# Patient Record
Sex: FEMALE | Race: BLACK OR AFRICAN AMERICAN | NOT HISPANIC OR LATINO | Employment: FULL TIME | URBAN - NONMETROPOLITAN AREA
[De-identification: names, ages, dates, MRNs, and addresses within clinical notes are randomized per-mention and may not be internally consistent; named-entity substitution may affect disease eponyms.]

---

## 2021-01-24 ENCOUNTER — HOSPITAL ENCOUNTER (EMERGENCY)
Facility: HOSPITAL | Age: 41
Discharge: HOME | End: 2021-01-24
Attending: EMERGENCY MEDICINE
Payer: COMMERCIAL

## 2021-01-24 VITALS
WEIGHT: 213.19 LBS | TEMPERATURE: 98.9 F | OXYGEN SATURATION: 97 % | RESPIRATION RATE: 16 BRPM | HEART RATE: 84 BPM | BODY MASS INDEX: 36.4 KG/M2 | HEIGHT: 64 IN | DIASTOLIC BLOOD PRESSURE: 92 MMHG | SYSTOLIC BLOOD PRESSURE: 144 MMHG

## 2021-01-24 DIAGNOSIS — S81.812A LACERATION OF LEFT LOWER LEG, INITIAL ENCOUNTER: Primary | ICD-10-CM

## 2021-01-24 PROCEDURE — 90715 TDAP VACCINE 7 YRS/> IM: CPT | Performed by: EMERGENCY MEDICINE

## 2021-01-24 PROCEDURE — 12001 RPR S/N/AX/GEN/TRNK 2.5CM/<: CPT | Performed by: EMERGENCY MEDICINE

## 2021-01-24 PROCEDURE — 99282 EMERGENCY DEPT VISIT SF MDM: CPT | Performed by: EMERGENCY MEDICINE

## 2021-01-24 PROCEDURE — 99283 EMERGENCY DEPT VISIT LOW MDM: CPT

## 2021-01-24 PROCEDURE — 90471 IMMUNIZATION ADMIN: CPT

## 2021-01-24 RX ORDER — LIDOCAINE HYDROCHLORIDE AND EPINEPHRINE 10; 10 MG/ML; UG/ML
5 INJECTION, SOLUTION INFILTRATION; PERINEURAL ONCE
Status: COMPLETED | OUTPATIENT
Start: 2021-01-24 | End: 2021-01-24

## 2021-01-24 RX ADMIN — LIDOCAINE HYDROCHLORIDE,EPINEPHRINE BITARTRATE 5 ML: 10; .01 INJECTION, SOLUTION INFILTRATION; PERINEURAL at 21:23

## 2021-01-24 RX ADMIN — TETANUS TOXOID, REDUCED DIPHTHERIA TOXOID AND ACELLULAR PERTUSSIS VACCINE, ADSORBED 0.5 ML: 5; 2.5; 8; 8; 2.5 SUSPENSION INTRAMUSCULAR at 21:21

## 2021-01-25 NOTE — ED NOTES
Patient's leg was wrapped with non-stick telfa, 4x4's and guaze  Patient verbalized understanding with d/c instructions        Feroz Alfaro RN  01/24/21 5648

## 2021-01-25 NOTE — ED PROVIDER NOTES
History  Chief Complaint   Patient presents with    Laceration     was getting a case of beer off a shelf and it hit her in the  left shin resulting in a laceration to it  59-year-old female with reported past medical history diabetes mellitus and asthma who is presenting for evaluation of a laceration to her left lower leg  Patient reports that she was purchasing a case of beer and went to get case of beer off a shelf when a bottle fell and cut her shin  This occurred about 1 hour prior to presentation  Bleeding is controlled at this time  Patient denies any other complaints at this time  She is not aware of the last time she received a vaccination for tetanus  Patient has no other complaints on review of systems  Prior to Admission Medications   Prescriptions Last Dose Informant Patient Reported? Taking?   sitaGLIPtin (JANUVIA) 100 mg tablet   Yes Yes   Sig: Take 100 mg by mouth daily      Facility-Administered Medications: None       Past Medical History:   Diagnosis Date    Asthma     Diabetes mellitus ([facility] [address] )        Past Surgical History:   Procedure Laterality Date    ADENOIDECTOMY       SECTION      FOOT FRACTURE SURGERY         History reviewed  No pertinent family history  I have reviewed and agree with the history as documented  E-Cigarette/Vaping    E-Cigarette Use Never User      E-Cigarette/Vaping Substances    Nicotine No     THC No     CBD No     Flavoring No     Other No     Unknown No      Social History     Tobacco Use    Smoking status: Current Every Day Smoker     Packs/day: 1 50    Smokeless tobacco: Never Used   Substance Use Topics    Alcohol use: Not Currently     Frequency: Never    Drug use: Never       Review of Systems   Constitutional: Negative for diaphoresis, fever and unexpected weight change  HENT: Negative for congestion, rhinorrhea and sore throat  Eyes: Negative for pain, discharge and visual disturbance     Respiratory: Negative for cough, shortness of breath and wheezing  Cardiovascular: Negative for chest pain, palpitations and leg swelling  Gastrointestinal: Negative for abdominal pain, blood in stool, constipation, diarrhea, nausea and vomiting  Genitourinary: Negative for dysuria, flank pain and hematuria  Musculoskeletal: Negative for arthralgias and joint swelling  Skin: Positive for wound (left lower leg)  Negative for rash  Allergic/Immunologic: Negative for environmental allergies and food allergies  Neurological: Negative for dizziness, seizures, weakness and numbness  Hematological: Negative for adenopathy  Psychiatric/Behavioral: Negative for confusion and hallucinations  Physical Exam  Physical Exam  Vitals signs and nursing note reviewed  Constitutional:       General: She is not in acute distress  Appearance: She is well-developed  HENT:      Head: Normocephalic and atraumatic  Right Ear: External ear normal       Left Ear: External ear normal    Eyes:      Conjunctiva/sclera: Conjunctivae normal       Pupils: Pupils are equal, round, and reactive to light  Cardiovascular:      Rate and Rhythm: Normal rate and regular rhythm  Pulmonary:      Effort: Pulmonary effort is normal  No respiratory distress  Musculoskeletal: Normal range of motion  General: No deformity  Skin:     General: Skin is warm and dry  Capillary Refill: Capillary refill takes less than 2 seconds  Comments: There is an approximately 2 cm laceration to the anterior left lower leg  Laceration extends through the epidermis but not into the subcutaneous fat  Bleeding is controlled  Neurological:      Mental Status: She is alert and oriented to person, place, and time  Comments: No gross motor deficits noted  Cranial nerves II-XII are intact  Speech is normal, without dysarthria or aphasia     Psychiatric:         Mood and Affect: Mood normal          Behavior: Behavior normal  Vital Signs  ED Triage Vitals [01/24/21 2050]   Temperature Pulse Respirations Blood Pressure SpO2   98 9 °F (37 2 °C) 84 18 141/85 98 %      Temp Source Heart Rate Source Patient Position - Orthostatic VS BP Location FiO2 (%)   Temporal Monitor Sitting Left arm --      Pain Score       8           Vitals:    01/24/21 2050 01/24/21 2100   BP: 141/85 144/92   Pulse: 84 84   Patient Position - Orthostatic VS: Sitting Sitting         Visual Acuity      ED Medications  Medications   lidocaine-epinephrine (XYLOCAINE/EPINEPHRINE) 1 %-1:100,000 injection 5 mL (5 mL Infiltration Given 1/24/21 2123)   tetanus-diphtheria-acellular pertussis (BOOSTRIX) IM injection 0 5 mL (0 5 mL Intramuscular Given 1/24/21 2121)       Diagnostic Studies  Results Reviewed     None                 No orders to display              Procedures  Laceration repair    Date/Time: 1/24/2021 9:54 PM  Performed by: Krzysztof Sahni MD  Authorized by: Krzysztof Sahni MD   Consent: Verbal consent obtained  Risks and benefits: risks, benefits and alternatives were discussed  Consent given by: patient  Patient understanding: patient states understanding of the procedure being performed  Patient consent: the patient's understanding of the procedure matches consent given  Procedure consent: procedure consent matches procedure scheduled  Site marked: the operative site was marked  Patient identity confirmed: verbally with patient and arm band  Time out: Immediately prior to procedure a "time out" was called to verify the correct patient, procedure, equipment, support staff and site/side marked as required    Body area: lower extremity  Location details: left lower leg  Laceration length: 2 cm  Foreign bodies: no foreign bodies  Tendon involvement: none  Nerve involvement: none  Vascular damage: no  Anesthesia: local infiltration    Anesthesia:  Local Anesthetic: lidocaine 1% with epinephrine  Anesthetic total: 2 mL      Procedure Details:  Preparation: Patient was prepped and draped in the usual sterile fashion  Irrigation solution: saline  Irrigation method: jet lavage  Amount of cleaning: standard  Skin closure: 4-0 nylon  Number of sutures: 4  Technique: simple  Approximation: close  Approximation difficulty: simple  Patient tolerance: patient tolerated the procedure well with no immediate complications                   ED Course                             SBIRT 20yo+      Most Recent Value   SBIRT (24 yo +)   In order to provide better care to our patients, we are screening all of our patients for alcohol and drug use  Would it be okay to ask you these screening questions? Yes Filed at: 01/24/2021 2058   Initial Alcohol Screen: US AUDIT-C    1  How often do you have a drink containing alcohol?  0 Filed at: 01/24/2021 2058   2  How many drinks containing alcohol do you have on a typical day you are drinking? 0 Filed at: 01/24/2021 2058   3a  Male UNDER 65: How often do you have five or more drinks on one occasion? 0 Filed at: 01/24/2021 2058   3b  FEMALE Any Age, or MALE 65+: How often do you have 4 or more drinks on one occassion? 0 Filed at: 01/24/2021 2058   Audit-C Score  0 Filed at: 01/24/2021 2058   DEE: How many times in the past year have you    Used an illegal drug or used a prescription medication for non-medical reasons? Never Filed at: 01/24/2021 2058                    MDM  Number of Diagnoses or Management Options  Laceration of left lower leg, initial encounter: new and does not require workup  Diagnosis management comments:     Patient presented with a laceration of the left lower leg extending through the epidermis but not into the subcutaneous fat  No contamination of the wound  Tetanus not up-to-date  Patient was given a tetanus immunization  Laceration was repaired as documented above after irrigation  Patient tolerated well  Provided wound care instructions    Advised follow-up for suture removal in 10-14 days  Patient verbalized understanding of wound care instructions, need for follow-up for suture removal, return precautions, and other instructions provided  Amount and/or Complexity of Data Reviewed  Decide to obtain previous medical records or to obtain history from someone other than the patient: yes  Review and summarize past medical records: yes    Risk of Complications, Morbidity, and/or Mortality  Presenting problems: low  Diagnostic procedures: minimal  Management options: minimal    Patient Progress  Patient progress: improved      Disposition  Final diagnoses:   Laceration of left lower leg, initial encounter     Time reflects when diagnosis was documented in both MDM as applicable and the Disposition within this note     Time User Action Codes Description Comment    1/24/2021  9:53 PM Saqib Huang Add [P15 [id number]] Laceration of left lower leg, initial encounter       ED Disposition     ED Disposition Condition Date/Time Comment    Discharge Good Sun Jan 24, 2021  9:52 PM [facility] discharge to home/self care  Follow-up Information     Follow up With Specialties Details Why Contact Info Additional Information    PCP  Call in 1 day Please find a PCP and follow-up within the next few weeks to establish care  [facility] Emergency Department Emergency Medicine Go on 2/7/2021 For suture removal in 10-14 days  [address] [phone]  [facility] Emergency Department, [address], [address]          Patient's Medications   Discharge Prescriptions    No medications on file     No discharge procedures on file      PDMP Review     None          ED Provider  Electronically Signed by           Elroy Erickson MD  01/24/21 8713

## 2021-01-25 NOTE — DISCHARGE INSTRUCTIONS
Do not submerge the laceration in water  Avoid baths, hot tubs, lakes, rivers, and other bodies of water  You should clean laceration with soap and water  Avoid alcohol and hydrogen peroxide as these can inhibit wound healing  Sutures should be removed in 10-14 days  You may return here or go to an urgent care or your PCP to have this done  Closely monitor for signs of infection  If you develop redness spreading from the laceration, pus draining from the laceration, fever, or shaking chills, return to the ER or call your PCP  Return to the ER if the wound splits open or if it has heavy bleeding that does not resolve with more than 15 minutes of firm, direct pressure

## 2021-04-07 PROBLEM — Z00.00 ENCOUNTER FOR PREVENTIVE HEALTH EXAMINATION: Status: ACTIVE | Noted: 2021-04-07

## 2021-04-08 ENCOUNTER — APPOINTMENT (OUTPATIENT)
Dept: ORTHOPEDIC SURGERY | Facility: CLINIC | Age: 41
End: 2021-04-08

## 2021-11-07 ENCOUNTER — HOSPITAL ENCOUNTER (EMERGENCY)
Facility: HOSPITAL | Age: 41
Discharge: HOME | End: 2021-11-07
Attending: FAMILY MEDICINE
Payer: COMMERCIAL

## 2021-11-07 VITALS
TEMPERATURE: 97.5 F | SYSTOLIC BLOOD PRESSURE: 136 MMHG | RESPIRATION RATE: 18 BRPM | HEART RATE: 83 BPM | DIASTOLIC BLOOD PRESSURE: 74 MMHG | WEIGHT: 185 LBS | BODY MASS INDEX: 31.76 KG/M2 | OXYGEN SATURATION: 98 %

## 2021-11-07 DIAGNOSIS — R60.0 LOWER EXTREMITY EDEMA: Primary | ICD-10-CM

## 2021-11-07 PROCEDURE — 99283 EMERGENCY DEPT VISIT LOW MDM: CPT

## 2021-11-07 PROCEDURE — 99284 EMERGENCY DEPT VISIT MOD MDM: CPT | Performed by: PHYSICIAN ASSISTANT

## 2021-12-19 ENCOUNTER — HOSPITAL ENCOUNTER (EMERGENCY)
Facility: HOSPITAL | Age: 41
Discharge: HOME | End: 2021-12-19
Attending: EMERGENCY MEDICINE
Payer: COMMERCIAL

## 2021-12-19 VITALS
WEIGHT: 194 LBS | HEART RATE: 77 BPM | BODY MASS INDEX: 33.3 KG/M2 | SYSTOLIC BLOOD PRESSURE: 147 MMHG | RESPIRATION RATE: 18 BRPM | OXYGEN SATURATION: 100 % | DIASTOLIC BLOOD PRESSURE: 70 MMHG | TEMPERATURE: 98.3 F

## 2021-12-19 DIAGNOSIS — B34.9 VIRAL SYNDROME: Primary | ICD-10-CM

## 2021-12-19 LAB
BETA-HYDROXYBUTYRATE: 0.1 MMOL/L
FLUAV RNA RESP QL NAA+PROBE: NEGATIVE
FLUBV RNA RESP QL NAA+PROBE: NEGATIVE
GLUCOSE SERPL-MCNC: 281 MG/DL (ref 65–140)
RSV RNA RESP QL NAA+PROBE: NEGATIVE
SARS-COV-2 RNA RESP QL NAA+PROBE: NEGATIVE

## 2021-12-19 PROCEDURE — 36415 COLL VENOUS BLD VENIPUNCTURE: CPT | Performed by: EMERGENCY MEDICINE

## 2021-12-19 PROCEDURE — 0241U HB NFCT DS VIR RESP RNA 4 TRGT: CPT | Performed by: EMERGENCY MEDICINE

## 2021-12-19 PROCEDURE — 82010 KETONE BODYS QUAN: CPT | Performed by: EMERGENCY MEDICINE

## 2021-12-19 PROCEDURE — 96372 THER/PROPH/DIAG INJ SC/IM: CPT

## 2021-12-19 PROCEDURE — 82948 REAGENT STRIP/BLOOD GLUCOSE: CPT

## 2021-12-19 PROCEDURE — 99283 EMERGENCY DEPT VISIT LOW MDM: CPT

## 2021-12-19 PROCEDURE — 96360 HYDRATION IV INFUSION INIT: CPT

## 2021-12-19 PROCEDURE — 99282 EMERGENCY DEPT VISIT SF MDM: CPT | Performed by: EMERGENCY MEDICINE

## 2021-12-19 RX ORDER — KETOROLAC TROMETHAMINE 30 MG/ML
15 INJECTION, SOLUTION INTRAMUSCULAR; INTRAVENOUS ONCE
Status: COMPLETED | OUTPATIENT
Start: 2021-12-19 | End: 2021-12-19

## 2021-12-19 RX ADMIN — KETOROLAC TROMETHAMINE 15 MG: 30 INJECTION, SOLUTION INTRAMUSCULAR at 05:51

## 2021-12-19 RX ADMIN — SODIUM CHLORIDE 1000 ML: 0.9 INJECTION, SOLUTION INTRAVENOUS at 06:09

## 2022-02-23 ENCOUNTER — HOSPITAL ENCOUNTER (EMERGENCY)
Facility: HOSPITAL | Age: 42
Discharge: HOME | End: 2022-02-23
Attending: EMERGENCY MEDICINE | Admitting: EMERGENCY MEDICINE
Payer: COMMERCIAL

## 2022-02-23 ENCOUNTER — APPOINTMENT (EMERGENCY)
Dept: CT IMAGING | Facility: HOSPITAL | Age: 42
End: 2022-02-23
Payer: COMMERCIAL

## 2022-02-23 VITALS
HEIGHT: 64 IN | WEIGHT: 200 LBS | DIASTOLIC BLOOD PRESSURE: 78 MMHG | SYSTOLIC BLOOD PRESSURE: 137 MMHG | TEMPERATURE: 97.9 F | BODY MASS INDEX: 34.15 KG/M2 | HEART RATE: 83 BPM | OXYGEN SATURATION: 100 % | RESPIRATION RATE: 21 BRPM

## 2022-02-23 DIAGNOSIS — R10.9 ABDOMINAL PAIN: Primary | ICD-10-CM

## 2022-02-23 LAB
ALBUMIN SERPL BCP-MCNC: 3.5 G/DL (ref 3.5–5)
ALP SERPL-CCNC: 80 U/L (ref 46–116)
ALT SERPL W P-5'-P-CCNC: 18 U/L (ref 12–78)
ANION GAP SERPL CALCULATED.3IONS-SCNC: 8 MMOL/L (ref 4–13)
AST SERPL W P-5'-P-CCNC: 9 U/L (ref 5–45)
BASOPHILS # BLD AUTO: 0.05 THOUSANDS/ΜL (ref 0–0.1)
BASOPHILS NFR BLD AUTO: 0 % (ref 0–1)
BILIRUB SERPL-MCNC: 0.32 MG/DL (ref 0.2–1)
BILIRUB UR QL STRIP: NEGATIVE
BUN SERPL-MCNC: 18 MG/DL (ref 5–25)
CALCIUM SERPL-MCNC: 8.9 MG/DL (ref 8.3–10.1)
CHLORIDE SERPL-SCNC: 104 MMOL/L (ref 100–108)
CLARITY UR: CLEAR
CO2 SERPL-SCNC: 24 MMOL/L (ref 21–32)
COLOR UR: YELLOW
CREAT SERPL-MCNC: 1.09 MG/DL (ref 0.6–1.3)
EOSINOPHIL # BLD AUTO: 0.24 THOUSAND/ΜL (ref 0–0.61)
EOSINOPHIL NFR BLD AUTO: 2 % (ref 0–6)
ERYTHROCYTE [DISTWIDTH] IN BLOOD BY AUTOMATED COUNT: 12.7 % (ref 11.6–15.1)
EXT PREG TEST URINE: NEGATIVE
EXT. CONTROL ED NAV: NORMAL
GFR SERPL CREATININE-BSD FRML MDRD: 62 ML/MIN/1.73SQ M
GLUCOSE SERPL-MCNC: 192 MG/DL (ref 65–140)
GLUCOSE UR STRIP-MCNC: NEGATIVE MG/DL
HCT VFR BLD AUTO: 44.2 % (ref 34.8–46.1)
HGB BLD-MCNC: 15 G/DL (ref 11.5–15.4)
HGB UR QL STRIP.AUTO: NEGATIVE
IMM GRANULOCYTES # BLD AUTO: 0.04 THOUSAND/UL (ref 0–0.2)
IMM GRANULOCYTES NFR BLD AUTO: 0 % (ref 0–2)
KETONES UR STRIP-MCNC: NEGATIVE MG/DL
LACTATE SERPL-SCNC: 1 MMOL/L (ref 0.5–2)
LEUKOCYTE ESTERASE UR QL STRIP: NEGATIVE
LIPASE SERPL-CCNC: 212 U/L (ref 73–393)
LYMPHOCYTES # BLD AUTO: 3.82 THOUSANDS/ΜL (ref 0.6–4.47)
LYMPHOCYTES NFR BLD AUTO: 33 % (ref 14–44)
MCH RBC QN AUTO: 29.5 PG (ref 26.8–34.3)
MCHC RBC AUTO-ENTMCNC: 33.9 G/DL (ref 31.4–37.4)
MCV RBC AUTO: 87 FL (ref 82–98)
MONOCYTES # BLD AUTO: 0.76 THOUSAND/ΜL (ref 0.17–1.22)
MONOCYTES NFR BLD AUTO: 7 % (ref 4–12)
NEUTROPHILS # BLD AUTO: 6.85 THOUSANDS/ΜL (ref 1.85–7.62)
NEUTS SEG NFR BLD AUTO: 58 % (ref 43–75)
NITRITE UR QL STRIP: NEGATIVE
NRBC BLD AUTO-RTO: 0 /100 WBCS
PH UR STRIP.AUTO: 6 [PH]
PLATELET # BLD AUTO: 289 THOUSANDS/UL (ref 149–390)
PMV BLD AUTO: 10.7 FL (ref 8.9–12.7)
POTASSIUM SERPL-SCNC: 3.9 MMOL/L (ref 3.5–5.3)
PROT SERPL-MCNC: 7.4 G/DL (ref 6.4–8.2)
PROT UR STRIP-MCNC: NEGATIVE MG/DL
RBC # BLD AUTO: 5.09 MILLION/UL (ref 3.81–5.12)
SODIUM SERPL-SCNC: 136 MMOL/L (ref 136–145)
SP GR UR STRIP.AUTO: 1.02 (ref 1–1.03)
UROBILINOGEN UR QL STRIP.AUTO: 0.2 E.U./DL
WBC # BLD AUTO: 11.76 THOUSAND/UL (ref 4.31–10.16)

## 2022-02-23 PROCEDURE — 99284 EMERGENCY DEPT VISIT MOD MDM: CPT

## 2022-02-23 PROCEDURE — 81003 URINALYSIS AUTO W/O SCOPE: CPT | Performed by: EMERGENCY MEDICINE

## 2022-02-23 PROCEDURE — 85025 COMPLETE CBC W/AUTO DIFF WBC: CPT | Performed by: EMERGENCY MEDICINE

## 2022-02-23 PROCEDURE — 83690 ASSAY OF LIPASE: CPT | Performed by: EMERGENCY MEDICINE

## 2022-02-23 PROCEDURE — 36415 COLL VENOUS BLD VENIPUNCTURE: CPT | Performed by: EMERGENCY MEDICINE

## 2022-02-23 PROCEDURE — 99284 EMERGENCY DEPT VISIT MOD MDM: CPT | Performed by: EMERGENCY MEDICINE

## 2022-02-23 PROCEDURE — 96361 HYDRATE IV INFUSION ADD-ON: CPT

## 2022-02-23 PROCEDURE — G1004 CDSM NDSC: HCPCS

## 2022-02-23 PROCEDURE — 83605 ASSAY OF LACTIC ACID: CPT | Performed by: EMERGENCY MEDICINE

## 2022-02-23 PROCEDURE — 96375 TX/PRO/DX INJ NEW DRUG ADDON: CPT

## 2022-02-23 PROCEDURE — 81025 URINE PREGNANCY TEST: CPT | Performed by: EMERGENCY MEDICINE

## 2022-02-23 PROCEDURE — 80053 COMPREHEN METABOLIC PANEL: CPT | Performed by: EMERGENCY MEDICINE

## 2022-02-23 PROCEDURE — 74177 CT ABD & PELVIS W/CONTRAST: CPT

## 2022-02-23 PROCEDURE — 96374 THER/PROPH/DIAG INJ IV PUSH: CPT

## 2022-02-23 RX ORDER — KETOROLAC TROMETHAMINE 30 MG/ML
15 INJECTION, SOLUTION INTRAMUSCULAR; INTRAVENOUS ONCE
Status: COMPLETED | OUTPATIENT
Start: 2022-02-23 | End: 2022-02-23

## 2022-02-23 RX ORDER — ONDANSETRON 2 MG/ML
4 INJECTION INTRAMUSCULAR; INTRAVENOUS ONCE
Status: COMPLETED | OUTPATIENT
Start: 2022-02-23 | End: 2022-02-23

## 2022-02-23 RX ADMIN — SODIUM CHLORIDE 2000 ML: 0.9 INJECTION, SOLUTION INTRAVENOUS at 19:16

## 2022-02-23 RX ADMIN — IOHEXOL 100 ML: 350 INJECTION, SOLUTION INTRAVENOUS at 19:43

## 2022-02-23 RX ADMIN — ONDANSETRON 4 MG: 2 INJECTION INTRAMUSCULAR; INTRAVENOUS at 19:17

## 2022-02-23 RX ADMIN — KETOROLAC TROMETHAMINE 15 MG: 30 INJECTION, SOLUTION INTRAMUSCULAR at 19:17

## 2022-02-23 NOTE — ED PROVIDER NOTES
History  Chief Complaint   Patient presents with    Abdominal Pain     pt complaijns of mid lower abdominal pain that started this AM     HPI  34 yo female with cc lower abdominal pain that is severe onset starting lower abdomen and then goes up to umbilicus; states has had these many times in past, sometimes not bad enough to come to ED ; notes most recent ED severity was last year; No nv; sweats from pain;  States she is always told no known cause found; has seen specialists, had scopes, no source found; states exactly same as prior episodes; Denies dysuria frequency; denies gyn related pain  Prior to Admission Medications   Prescriptions Last Dose Informant Patient Reported? Taking?   sitaGLIPtin (JANUVIA) 100 mg tablet 2022 at Unknown time  Yes Yes   Sig: Take 100 mg by mouth daily      Facility-Administered Medications: None       Past Medical History:   Diagnosis Date    Asthma     Diabetes mellitus ([facility] [address] )        Past Surgical History:   Procedure Laterality Date    ADENOIDECTOMY       SECTION      FOOT FRACTURE SURGERY         History reviewed  No pertinent family history  I have reviewed and agree with the history as documented  E-Cigarette/Vaping    E-Cigarette Use Never User      E-Cigarette/Vaping Substances    Nicotine No     THC No     CBD No     Flavoring No     Other No     Unknown No      Social History     Tobacco Use    Smoking status: Current Every Day Smoker     Packs/day: 1 50     Years: 15 00     Pack years: 22 50    Smokeless tobacco: Never Used   Vaping Use    Vaping Use: Never used   Substance Use Topics    Alcohol use: Not Currently    Drug use: Never       Review of Systems   Constitutional: Negative for chills and fever  HENT: Negative for ear pain and sore throat  Eyes: Negative for pain and visual disturbance  Respiratory: Negative for cough and shortness of breath  Cardiovascular: Negative for chest pain and palpitations  Gastrointestinal: Positive for abdominal pain  Negative for blood in stool, diarrhea, nausea, rectal pain and vomiting  Genitourinary: Negative for decreased urine volume, dysuria, frequency, hematuria, menstrual problem, pelvic pain and vaginal bleeding  Musculoskeletal: Negative for arthralgias and back pain  Skin: Negative for color change and rash  Neurological: Negative for seizures and syncope  Psychiatric/Behavioral: Negative for agitation and confusion  All other systems reviewed and are negative  Physical Exam  Physical Exam  Vitals and nursing note reviewed  Constitutional:       General: She is in acute distress  Appearance: She is well-developed  She is ill-appearing  She is not toxic-appearing or diaphoretic  HENT:      Head: Normocephalic and atraumatic  Mouth/Throat:      Mouth: Mucous membranes are moist    Eyes:      Conjunctiva/sclera: Conjunctivae normal    Cardiovascular:      Rate and Rhythm: Normal rate and regular rhythm  Heart sounds: No murmur heard  Pulmonary:      Effort: Pulmonary effort is normal  No respiratory distress  Breath sounds: Normal breath sounds  Abdominal:      General: Abdomen is flat  Palpations: Abdomen is soft  Tenderness: There is abdominal tenderness in the right lower quadrant, periumbilical area, suprapubic area and left lower quadrant  There is guarding and rebound  Negative signs include Shukla's sign  Musculoskeletal:      Cervical back: Neck supple  Skin:     General: Skin is warm and dry  Capillary Refill: Capillary refill takes less than 2 seconds  Neurological:      General: No focal deficit present  Mental Status: She is alert and oriented to person, place, and time  Psychiatric:         Mood and Affect: Mood is anxious           Behavior: Behavior normal          Vital Signs  ED Triage Vitals [02/23/22 1834]   Temperature Pulse Respirations Blood Pressure SpO2   97 9 °F (36 6 °C) 83 21 137/78 100 %      Temp Source Heart Rate Source Patient Position - Orthostatic VS BP Location FiO2 (%)   Temporal Monitor Lying Right arm --      Pain Score       10 - Worst Possible Pain           Vitals:    02/23/22 1834   BP: 137/78   Pulse: 83   Patient Position - Orthostatic VS: Lying         Visual Acuity      ED Medications  Medications   ondansetron (ZOFRAN) injection 4 mg (4 mg Intravenous Given 2/23/22 1917)   ketorolac (TORADOL) injection 15 mg (15 mg Intravenous Given 2/23/22 1917)   sodium chloride 0 9 % bolus 2,000 mL (0 mL Intravenous Stopped 2/23/22 2016)   iohexol (OMNIPAQUE) 350 MG/ML injection (SINGLE-DOSE) 100 mL (100 mL Intravenous Given 2/23/22 1943)       Diagnostic Studies  Results Reviewed     Procedure Component Value Units Date/Time    UA w Reflex to Microscopic w Reflex to Culture [[id number]] Collected: 02/23/22 1924    Lab Status: Final result Specimen: Urine, Clean Catch Updated: 02/23/22 1933     Color, UA Yellow     Clarity, UA Clear     Specific [place], UA 1 020     pH, UA 6 0     Leukocytes, UA Negative     Nitrite, UA Negative     Protein, UA Negative mg/dl      Glucose, UA Negative mg/dl      Ketones, UA Negative mg/dl      Urobilinogen, UA 0 2 E U /dl      Bilirubin, UA Negative     Blood, UA Negative    Lactic acid [[id number]]  (Normal) Collected: 02/23/22 1909    Lab Status: Final result Specimen: Blood from Arm, Left Updated: 02/23/22 1933     LACTIC ACID 1 0 mmol/L     Narrative:      Result may be elevated if tourniquet was used during collection      Comprehensive metabolic panel [[id number]]  (Abnormal) Collected: 02/23/22 1909    Lab Status: Final result Specimen: Blood from Arm, Left Updated: 02/23/22 1930     Sodium 136 mmol/L      Potassium 3 9 mmol/L      Chloride 104 mmol/L      CO2 24 mmol/L      ANION GAP 8 mmol/L      BUN 18 mg/dL      Creatinine 1 09 mg/dL      Glucose 192 mg/dL      Calcium 8 9 mg/dL      AST 9 U/L      ALT 18 U/L      Alkaline Phosphatase 80 U/L      Total Protein 7 4 g/dL      Albumin 3 5 g/dL      Total Bilirubin 0 32 mg/dL      eGFR 62 ml/min/1 73sq m     Narrative:      [place] guidelines for Chronic Kidney Disease (CKD):     Stage 1 with normal or high GFR (GFR > 90 mL/min/1 73 square meters)    Stage 2 Mild CKD (GFR = 60-89 mL/min/1 73 square meters)    Stage 3A Moderate CKD (GFR = 45-59 mL/min/1 73 square meters)    Stage 3B Moderate CKD (GFR = 30-44 mL/min/1 73 square meters)    Stage 4 Severe CKD (GFR = 15-29 mL/min/1 73 square meters)    Stage 5 End Stage CKD (GFR <15 mL/min/1 73 square meters)  Note: GFR calculation is accurate only with a steady state creatinine    Lipase [[id number]]  (Normal) Collected: 02/23/22 1909    Lab Status: Final result Specimen: Blood from Arm, Left Updated: 02/23/22 1930     Lipase 212 u/L     POCT pregnancy, urine [[id number]]  (Normal) Resulted: 02/23/22 1916    Lab Status: Final result Updated: 02/23/22 1916     EXT PREG TEST UR (Ref: Negative) negative     Control valid    CBC and differential [[id number]]  (Abnormal) Collected: 02/23/22 1909    Lab Status: Final result Specimen: Blood from Arm, Left Updated: 02/23/22 1915     WBC 11 76 Thousand/uL      RBC 5 09 Million/uL      Hemoglobin 15 0 g/dL      Hematocrit 44 2 %      MCV 87 fL      MCH 29 5 pg      MCHC 33 9 g/dL      RDW 12 7 %      MPV 10 7 fL      Platelets 452 Thousands/uL      nRBC 0 /100 WBCs      Neutrophils Relative 58 %      Immat GRANS % 0 %      Lymphocytes Relative 33 %      Monocytes Relative 7 %      Eosinophils Relative 2 %      Basophils Relative 0 %      Neutrophils Absolute 6 85 Thousands/µL      Immature Grans Absolute 0 04 Thousand/uL      Lymphocytes Absolute 3 82 Thousands/µL      Monocytes Absolute 0 76 Thousand/µL      Eosinophils Absolute 0 24 Thousand/µL      Basophils Absolute 0 05 Thousands/µL                  CT abdomen pelvis with contrast   Final Result by Anabelle Simmons MD (02/23 2015) No acute inflammatory process identified within the abdomen or pelvis  Workstation performed: [id number]                    Procedures  Procedures         ED Course                ct abdomen pelvis:   IMPRESSION:No acute inflammatory process identified within the abdomen or pelvis          patient states she feels a lot better;          SBIRT 20yo+      Most Recent Value   SBIRT (24 yo +)    In order to provide better care to our patients, we are screening all of our patients for alcohol and drug use  Would it be okay to ask you these screening questions? Yes Filed at: 02/23/2022 0464   Initial Alcohol Screen: US AUDIT-C     1  How often do you have a drink containing alcohol? 0 Filed at: 02/23/2022 1837   2  How many drinks containing alcohol do you have on a typical day you are drinking? 0 Filed at: 02/23/2022 1837   3b  FEMALE Any Age, or MALE 65+: How often do you have 4 or more drinks on one occassion? 0 Filed at: 02/23/2022 1837   Audit-C Score 0 Filed at: 02/23/2022 9408   DEE: How many times in the past year have you    Used an illegal drug or used a prescription medication for non-medical reasons? Never Filed at: 02/23/2022 1837                    [facility]  Ct shows no acute pathology; patient notes this is typical of prior episodes;      Patient notes she feels a lot better, as is typical, and has to leave to  someone; of note, patient was encountered by nursing staff as she was leaving the department, still had her iv in place; removed by nursing prior to discharge;     Disposition  Final diagnoses:   Abdominal pain     Time reflects when diagnosis was documented in both MDM as applicable and the Disposition within this note     Time User Action Codes Description Comment    2/23/2022  9:00 PM Giselle Gee Add [R10 9] Abdominal pain       ED Disposition     ED Disposition Condition Date/Time Comment    Discharge Stable Wed Feb 23, 2022  9:03 PM [facility] discharge to home/self care             Follow-up Information    None         Discharge Medication List as of 2/23/2022  9:04 PM      CONTINUE these medications which have NOT CHANGED    Details   sitaGLIPtin (JANUVIA) 100 mg tablet Take 100 mg by mouth daily, Historical Med             No discharge procedures on file      PDMP Review     None          ED Provider  Electronically Signed by           Arva Cogan, MD  02/23/22 3459

## 2022-02-23 NOTE — Clinical Note
Carmita Styles was seen and treated in our emergency department on 2/23/2022  Diagnosis:     Luis Mclaughlin    She may return on this date: Off work as needed thru feb 27th as needed     If you have any questions or concerns, please don't hesitate to call        Farrah Villagomez MD    ______________________________           _______________          _______________  [facility] [place]                              Date                                Time

## 2022-02-24 NOTE — ED NOTES
Patient attempted to leave department with IV still in arm and discharge instructions not given  Removed by staff at this time        Pineda Head RN  02/23/22 3583

## 2022-02-24 NOTE — DISCHARGE INSTRUCTIONS
Stay well hydrated; see the doctor tomorrow that you stated is your new doctor; let doctor office know of test results available thru computer for ED evaluation today; You can also of course call the office of the primary doctor whom you have seen for this before, as well as the specialists     Ct scan of abdomen today:   IMPRESSION: No acute inflammatory process identified within the abdomen or pelvis

## 2022-03-01 ENCOUNTER — HOSPITAL ENCOUNTER (EMERGENCY)
Facility: HOSPITAL | Age: 42
Discharge: HOME | End: 2022-03-02
Attending: EMERGENCY MEDICINE
Payer: COMMERCIAL

## 2022-03-01 ENCOUNTER — APPOINTMENT (EMERGENCY)
Dept: ULTRASOUND IMAGING | Facility: HOSPITAL | Age: 42
End: 2022-03-01
Payer: COMMERCIAL

## 2022-03-01 VITALS
BODY MASS INDEX: 35.72 KG/M2 | HEART RATE: 63 BPM | SYSTOLIC BLOOD PRESSURE: 125 MMHG | OXYGEN SATURATION: 98 % | DIASTOLIC BLOOD PRESSURE: 81 MMHG | RESPIRATION RATE: 20 BRPM | TEMPERATURE: 97.5 F | WEIGHT: 208.11 LBS

## 2022-03-01 DIAGNOSIS — R10.2 PELVIC PAIN: Primary | ICD-10-CM

## 2022-03-01 LAB
ALBUMIN SERPL BCP-MCNC: 3.5 G/DL (ref 3.5–5)
ALP SERPL-CCNC: 77 U/L (ref 46–116)
ALT SERPL W P-5'-P-CCNC: 16 U/L (ref 12–78)
ANION GAP SERPL CALCULATED.3IONS-SCNC: 10 MMOL/L (ref 4–13)
AST SERPL W P-5'-P-CCNC: 12 U/L (ref 5–45)
BASOPHILS # BLD AUTO: 0.04 THOUSANDS/ΜL (ref 0–0.1)
BASOPHILS NFR BLD AUTO: 0 % (ref 0–1)
BILIRUB SERPL-MCNC: 0.25 MG/DL (ref 0.2–1)
BUN SERPL-MCNC: 16 MG/DL (ref 5–25)
CALCIUM SERPL-MCNC: 9.2 MG/DL (ref 8.3–10.1)
CHLORIDE SERPL-SCNC: 101 MMOL/L (ref 100–108)
CO2 SERPL-SCNC: 24 MMOL/L (ref 21–32)
CREAT SERPL-MCNC: 0.94 MG/DL (ref 0.6–1.3)
EOSINOPHIL # BLD AUTO: 0.26 THOUSAND/ΜL (ref 0–0.61)
EOSINOPHIL NFR BLD AUTO: 2 % (ref 0–6)
ERYTHROCYTE [DISTWIDTH] IN BLOOD BY AUTOMATED COUNT: 12.6 % (ref 11.6–15.1)
GFR SERPL CREATININE-BSD FRML MDRD: 74 ML/MIN/1.73SQ M
GLUCOSE SERPL-MCNC: 212 MG/DL (ref 65–140)
HCG SERPL QL: NEGATIVE
HCT VFR BLD AUTO: 42.6 % (ref 34.8–46.1)
HGB BLD-MCNC: 14.6 G/DL (ref 11.5–15.4)
IMM GRANULOCYTES # BLD AUTO: 0.05 THOUSAND/UL (ref 0–0.2)
IMM GRANULOCYTES NFR BLD AUTO: 0 % (ref 0–2)
LIPASE SERPL-CCNC: 182 U/L (ref 73–393)
LYMPHOCYTES # BLD AUTO: 4.27 THOUSANDS/ΜL (ref 0.6–4.47)
LYMPHOCYTES NFR BLD AUTO: 33 % (ref 14–44)
MCH RBC QN AUTO: 29.6 PG (ref 26.8–34.3)
MCHC RBC AUTO-ENTMCNC: 34.3 G/DL (ref 31.4–37.4)
MCV RBC AUTO: 86 FL (ref 82–98)
MONOCYTES # BLD AUTO: 0.86 THOUSAND/ΜL (ref 0.17–1.22)
MONOCYTES NFR BLD AUTO: 7 % (ref 4–12)
NEUTROPHILS # BLD AUTO: 7.44 THOUSANDS/ΜL (ref 1.85–7.62)
NEUTS SEG NFR BLD AUTO: 58 % (ref 43–75)
NRBC BLD AUTO-RTO: 0 /100 WBCS
PLATELET # BLD AUTO: 308 THOUSANDS/UL (ref 149–390)
PMV BLD AUTO: 10 FL (ref 8.9–12.7)
POTASSIUM SERPL-SCNC: 4.3 MMOL/L (ref 3.5–5.3)
PROT SERPL-MCNC: 7.5 G/DL (ref 6.4–8.2)
RBC # BLD AUTO: 4.94 MILLION/UL (ref 3.81–5.12)
SODIUM SERPL-SCNC: 135 MMOL/L (ref 136–145)
WBC # BLD AUTO: 12.92 THOUSAND/UL (ref 4.31–10.16)

## 2022-03-01 PROCEDURE — 85025 COMPLETE CBC W/AUTO DIFF WBC: CPT | Performed by: EMERGENCY MEDICINE

## 2022-03-01 PROCEDURE — 84703 CHORIONIC GONADOTROPIN ASSAY: CPT | Performed by: EMERGENCY MEDICINE

## 2022-03-01 PROCEDURE — 80053 COMPREHEN METABOLIC PANEL: CPT | Performed by: EMERGENCY MEDICINE

## 2022-03-01 PROCEDURE — 83690 ASSAY OF LIPASE: CPT | Performed by: EMERGENCY MEDICINE

## 2022-03-01 PROCEDURE — 76856 US EXAM PELVIC COMPLETE: CPT

## 2022-03-01 PROCEDURE — 96374 THER/PROPH/DIAG INJ IV PUSH: CPT

## 2022-03-01 PROCEDURE — 99284 EMERGENCY DEPT VISIT MOD MDM: CPT | Performed by: EMERGENCY MEDICINE

## 2022-03-01 PROCEDURE — 36415 COLL VENOUS BLD VENIPUNCTURE: CPT | Performed by: EMERGENCY MEDICINE

## 2022-03-01 PROCEDURE — 99284 EMERGENCY DEPT VISIT MOD MDM: CPT

## 2022-03-01 PROCEDURE — 96361 HYDRATE IV INFUSION ADD-ON: CPT

## 2022-03-01 RX ORDER — KETOROLAC TROMETHAMINE 30 MG/ML
10 INJECTION, SOLUTION INTRAMUSCULAR; INTRAVENOUS ONCE
Status: COMPLETED | OUTPATIENT
Start: 2022-03-01 | End: 2022-03-01

## 2022-03-01 RX ORDER — MORPHINE SULFATE 4 MG/ML
4 INJECTION, SOLUTION INTRAMUSCULAR; INTRAVENOUS ONCE
Status: DISCONTINUED | OUTPATIENT
Start: 2022-03-01 | End: 2022-03-01

## 2022-03-01 RX ADMIN — SODIUM CHLORIDE 1000 ML: 9 INJECTION, SOLUTION INTRAVENOUS at 21:53

## 2022-03-01 RX ADMIN — KETOROLAC TROMETHAMINE 9.9 MG: 30 INJECTION, SOLUTION INTRAMUSCULAR at 22:32

## 2022-03-02 RX ORDER — MEDROXYPROGESTERONE ACETATE 150 MG/ML
150 INJECTION, SUSPENSION INTRAMUSCULAR ONCE
Qty: 1 ML | Refills: 0 | Status: SHIPPED | OUTPATIENT
Start: 2022-03-02 | End: 2022-04-21

## 2022-03-02 NOTE — DISCHARGE INSTRUCTIONS
Please see your doctor this Friday for your first appointment  You should be able take the medication that you have been prescribed (Depo-Provera) to your doctor's office for administration there  You may want to call the office to confirm this prior to your appointment  Please contact any of the gynecologists listed below for further evaluation  If you develop fever, painful swelling in your abdomen, uncontrollable abdominal pain, or any episodes of passing out, please go to the ER

## 2022-03-02 NOTE — ED PROVIDER NOTES
History  Chief Complaint   Patient presents with    Abdominal Pain     Patient reports abdominal pain, ongoing since last visit  Patient reports no nausea, vomiting, diarrhea, or constipation  36 y/o woman hx NIDDM/asthma presenting to ED with recurrent suprapubic abdominal pain since seen in this ED on 2022  Pain aching/throbbing and always localized in the suprapubic region with some radiation into the pelvis; not worsened by urination/defecation but worsened by position change  Has taken OTC analgesics without significant improvement symptoms  Pain did improve somewhat without full resolution after being seen on  but has now worsened over the past several days  Pain has been reoccurring at various intervals (every 2 weeks to every several months; she once had a period of 6 months without sx) but notes that pain typically occurs about 2 wk after onset of menstruation  When pain begins initially, she had significant difficulty with urination/defecation  Has never had dysuria/hematuria/hematochezia when pain is present  Episode now is first time that pain has reoccurred in such a short interval   During evaluation on , CT abdomen/pelvis obtained which demonstrated no acute pathology  With current symptoms, no fever/chills/nausea/vomiting/abdominal distension/dysuria/hematuria/urgency/frequency  No vaginal discharge/itching  No hx STI  Over the past four years, she has noted menorrhagia and a more consistent menstrual cycle (typically once per month lasting 5 days; previous to this, menstruation had occurred irregularly and lasted variable amounts of time)  Prior GI surgery includes  x2, most recently 2015  States she has undergone multiple prior evaluations (in ED and with specialists) for these symptoms including GI and gyn  Most recent specially consultation was gyn in [place]   She was told that no abnormalities were identified that could account for symptoms: specifically no abnormalities related to her  in 2015  The procedure itself was uncomplicated and she did not have any difficulty in recovering from the procedure  She was not aware of any previous diagnosis of endometriosis and stated she had not discussed this with her gynecologist at any point  A/p:  Recurrent characteristic abdominal pain over several years in a consistent distribution with previous negative evaluation by report and recent negative evaluation with normal CT scan  Patient traces onset of symptoms to the  she had in 2015  Lower urinary tract infection possible although unlikely  Low concern for intra-abdominal pathology given recent normal CT abdomen/pelvis and continuous symptoms  Strong concern would be for gyn etiology particularly including endometriosis  Will obtain pelvic ultrasound; while it would not be expected to demonstrate abnormal findings in the setting of endometriosis, it can exclude alternative pathologies (ovarian cyst/mass, fibroid disease, etc )  She will certainty require gyn follow-up if discharged  History provided by:  Medical records and patient  Abdominal Pain  Associated symptoms: constipation    Associated symptoms: no chest pain, no chills, no cough, no diarrhea, no dysuria, no fever, no nausea, no shortness of breath and no vomiting        Prior to Admission Medications   Prescriptions Last Dose Informant Patient Reported? Taking?   sitaGLIPtin (JANUVIA) 100 mg tablet   Yes No   Sig: Take 100 mg by mouth daily      Facility-Administered Medications: None       Past Medical History:   Diagnosis Date    Asthma     Diabetes mellitus ([facility] [address] )        Past Surgical History:   Procedure Laterality Date    ADENOIDECTOMY       SECTION      COSMETIC SURGERY      stomach    FOOT FRACTURE SURGERY         History reviewed  No pertinent family history  I have reviewed and agree with the history as documented      E-Cigarette/Vaping    E-Cigarette Use Never User      E-Cigarette/Vaping Substances    Nicotine No     THC No     CBD No     Flavoring No     Other No     Unknown No      Social History     Tobacco Use    Smoking status: Current Every Day Smoker     Packs/day: 1 00     Years: 15 00     Pack years: 15 00    Smokeless tobacco: Never Used   Vaping Use    Vaping Use: Never used   Substance Use Topics    Alcohol use: Not Currently    Drug use: Never       Review of Systems   Constitutional: Negative for chills and fever  Respiratory: Negative for cough and shortness of breath  Cardiovascular: Negative for chest pain and palpitations  Gastrointestinal: Positive for abdominal pain and constipation  Negative for abdominal distention, blood in stool, diarrhea, nausea and vomiting  Genitourinary: Positive for menstrual problem and pelvic pain  Negative for difficulty urinating, dysuria and flank pain  Neurological: Negative for weakness, light-headedness and numbness  All other systems reviewed and are negative  Physical Exam  Physical Exam  Vitals and nursing note reviewed  Constitutional:       General: She is awake  She is in acute distress (moderate painful distress)  Appearance: Normal appearance  She is well-developed  HENT:      Head: Normocephalic and atraumatic  Right Ear: Hearing and external ear normal       Left Ear: Hearing and external ear normal    Neck:      Trachea: Trachea and phonation normal    Cardiovascular:      Rate and Rhythm: Normal rate and regular rhythm  Pulses:           Radial pulses are 2+ on the right side and 2+ on the left side  Dorsalis pedis pulses are 2+ on the right side and 2+ on the left side  Posterior tibial pulses are 2+ on the right side and 2+ on the left side  Heart sounds: Normal heart sounds, S1 normal and S2 normal  No murmur heard  No friction rub  No gallop      Pulmonary:      Effort: Pulmonary effort is normal  No respiratory distress  Breath sounds: Normal breath sounds  No stridor  No decreased breath sounds, wheezing, rhonchi or rales  Abdominal:      General: There is no distension  Palpations: Abdomen is soft  Tenderness: There is abdominal tenderness in the periumbilical area and suprapubic area  There is no guarding or rebound  Skin:     General: Skin is warm and dry  Neurological:      Mental Status: She is alert and oriented to person, place, and time  GCS: GCS eye subscore is 4  GCS verbal subscore is 5  GCS motor subscore is 6  Cranial Nerves: No cranial nerve deficit  Sensory: No sensory deficit  Motor: No abnormal muscle tone  Comments: PERRLA; EOMI  Sensation intact to light touch over face in V1-V3 distribution bilaterally  Facial expressions symmetric  Tongue/uvula midline  Shoulder shrug equal bilaterally  Strength 5/5 in UE/LE bilaterally  Sensation intact to light touch in UE/LE bilaterally           Vital Signs  ED Triage Vitals   Temperature Pulse Respirations Blood Pressure SpO2   03/01/22 2127 03/01/22 2127 03/01/22 2127 03/01/22 2144 03/01/22 2127   97 5 °F (36 4 °C) 78 18 140/87 98 %      Temp Source Heart Rate Source Patient Position - Orthostatic VS BP Location FiO2 (%)   03/01/22 2127 03/01/22 2145 03/01/22 2145 03/01/22 2144 --   Tympanic Monitor Lying Right arm       Pain Score       03/01/22 2127       9           Vitals:    03/01/22 2144 03/01/22 2145 03/01/22 2215 03/01/22 2230   BP: 140/87 140/87 120/77 125/81   Pulse:  74 71 63   Patient Position - Orthostatic VS:  Lying Lying Lying         Visual Acuity      ED Medications  Medications   ketorolac (TORADOL) injection 9 9 mg (9 9 mg Intravenous Given 3/1/22 2232)   sodium chloride 0 9 % bolus 1,000 mL (0 mL Intravenous Stopped 3/2/22 0100)       Diagnostic Studies  Results Reviewed     Procedure Component Value Units Date/Time    Lipase [[id number]]  (Normal) Collected: 03/01/22 2152    Lab Status: Final result Specimen: Blood from Arm, Left Updated: 03/01/22 2219     Lipase 182 u/L     hCG, qualitative pregnancy [[id number]]  (Normal) Collected: 03/01/22 2152    Lab Status: Final result Specimen: Blood from Arm, Left Updated: 03/01/22 2219     Preg, Serum Negative    CMP [[id number]]  (Abnormal) Collected: 03/01/22 2152    Lab Status: Final result Specimen: Blood from Arm, Left Updated: 03/01/22 2213     Sodium 135 mmol/L      Potassium 4 3 mmol/L      Chloride 101 mmol/L      CO2 24 mmol/L      ANION GAP 10 mmol/L      BUN 16 mg/dL      Creatinine 0 94 mg/dL      Glucose 212 mg/dL      Calcium 9 2 mg/dL      AST 12 U/L      ALT 16 U/L      Alkaline Phosphatase 77 U/L      Total Protein 7 5 g/dL      Albumin 3 5 g/dL      Total Bilirubin 0 25 mg/dL      eGFR 74 ml/min/1 73sq m     Narrative:      [facility] guidelines for Chronic Kidney Disease (CKD):     Stage 1 with normal or high GFR (GFR > 90 mL/min/1 73 square meters)    Stage 2 Mild CKD (GFR = 60-89 mL/min/1 73 square meters)    Stage 3A Moderate CKD (GFR = 45-59 mL/min/1 73 square meters)    Stage 3B Moderate CKD (GFR = 30-44 mL/min/1 73 square meters)    Stage 4 Severe CKD (GFR = 15-29 mL/min/1 73 square meters)    Stage 5 End Stage CKD (GFR <15 mL/min/1 73 square meters)  Note: GFR calculation is accurate only with a steady state creatinine    CBC and differential [[id number]]  (Abnormal) Collected: 03/01/22 2152    Lab Status: Final result Specimen: Blood from Arm, Left Updated: 03/01/22 2159     WBC 12 92 Thousand/uL      RBC 4 94 Million/uL      Hemoglobin 14 6 g/dL      Hematocrit 42 6 %      MCV 86 fL      MCH 29 6 pg      MCHC 34 3 g/dL      RDW 12 6 %      MPV 10 0 fL      Platelets 119 Thousands/uL      nRBC 0 /100 WBCs      Neutrophils Relative 58 %      Immat GRANS % 0 %      Lymphocytes Relative 33 %      Monocytes Relative 7 %      Eosinophils Relative 2 %      Basophils Relative 0 %      Neutrophils Absolute 7 44 Thousands/µL      Immature Grans Absolute 0 05 Thousand/uL      Lymphocytes Absolute 4 27 Thousands/µL      Monocytes Absolute 0 86 Thousand/µL      Eosinophils Absolute 0 26 Thousand/µL      Basophils Absolute 0 04 Thousands/µL                  US pelvis transabdominal only   Final Result by Dell Arriola DO (03/02 0031)       Unremarkable exam                       Workstation performed: [id number]                    Procedures  Procedures         ED Course  ED Course as of 03/02/22 0545   Tue Mar 01, 2022   2203 CBC and differential(!)  Mild leukocytosis  Hg/Hct wnl  Plt wnl   2228 hCG, qualitative pregnancy  Negative   2228 CMP(!)  Mild hyperglycemia  Electrolytes normal   Transaminases normal    2228 Lipase  Lipase normal   2228 US completed and awaiting interpretation   2328 Patient updated regarding results thus far  Awaiting pelvic ultrasound results  She will of course need referral to Gynecology  Low threshold to initiate medroxyprogesterone treatment (absolute contraindication to MOOK as she is smoker greater than 15 cigarettes per day and age greater than 28)  High-potency NSAID for pain control in the interval  Patient is agreeable to this plan pending US results not demonstrating any other source for sx  Wed Mar 02, 2022   0034 US pelvis transabdominal only  FINDINGS:     UTERUS:  The uterus is retroflexed in position, measuring 11 5 x 4 6 x 4 0 cm  The uterus has a normal contour and echotexture  The cervix appears within normal limits      ENDOMETRIUM:    Normal appearance, 6 mm in thickness     OVARIES/ADNEXA:  Right ovary:  3 2 x 2 0 x 2 6 cm  8 5 mL  Normal appearance  Vascular flow to the right ovary is preserved      Left ovary:  3 1 x 2 1 x 2 2 cm  7 5 mL  Normal appearance  Vascular flow to the left ovary is preserved      There is discrete no free fluid         IMPRESSION:     Unremarkable exam       0034 Ultrasound result is normal with no acute pathology   No alternative etiologies identified for patient's symptoms  Strongest concern remains for endometriosis as previously described: patient will require outpatient gynecology follow-up  Ambulatory referral placed in Epic  Medroxyprogesterone treatment in the interval reasonable as well as high potency NSAID for symptom control  All questions were answered to patient's satisfaction prior to discharge  She expressed understanding and agreed to plan  MDM    Disposition  Final diagnoses:   Recurrent pelvic pain: possible endometriosis     Time reflects when diagnosis was documented in both MDM as applicable and the Disposition within this note     Time User Action Codes Description Comment    3/2/2022 12:42 AM Sruthi Lee Add [R10 2] Pelvic pain     3/2/2022 12:42 AM Sruthi Lee Modify [R10 2] Recurrent pelvic pain: possible endometriosis       ED Disposition     ED Disposition Condition Date/Time Comment    Discharge Stable Wed Mar 2, 2022 12:42 AM Vandana Cadet discharge to home/self care              Follow-up Information     Follow up With Specialties Details Why Contact Info Additional [address] [facility] Obstetrics and Gynecology   [address] [address]  [facility] [address] [place], [place], [address] [place] [address] Obstetrics and Gynecology   [address] [address]  [facility], [address] [place] [address], [id number]   [address] [place] [address] Obstetrics and Gynecology   [facility] [phone] [address] [place] [address], [id number],           Discharge Medication List as of 3/2/2022 12:49 AM      START taking these medications    Details   diclofenac sodium (VOLTAREN) 50 mg EC tablet Take 1 tablet (50 mg total) by mouth 2 (two) times a day as needed (pain), Starting Wed 3/2/2022, Normal      medroxyPROGESTERone (DEPO-PROVERA) 150 mg/mL injection Inject 1 mL (150 mg total) into a muscle 1 (one) time for 1 dose, Starting Wed 3/2/2022, Normal         CONTINUE these medications which have NOT CHANGED    Details   sitaGLIPtin (JANUVIA) 100 mg tablet Take 100 mg by mouth daily, Historical Med                 PDMP Review     None          ED Provider  Electronically Signed by           Pedro Alfaro DO  03/02/22 4175

## 2022-04-21 ENCOUNTER — OFFICE VISIT (OUTPATIENT)
Dept: FAMILY MEDICINE CLINIC | Facility: CLINIC | Age: 42
End: 2022-04-21
Payer: COMMERCIAL

## 2022-04-21 VITALS
TEMPERATURE: 96.9 F | OXYGEN SATURATION: 98 % | DIASTOLIC BLOOD PRESSURE: 74 MMHG | WEIGHT: 201.5 LBS | BODY MASS INDEX: 34.4 KG/M2 | SYSTOLIC BLOOD PRESSURE: 116 MMHG | HEART RATE: 101 BPM | HEIGHT: 64 IN

## 2022-04-21 DIAGNOSIS — E11.9 TYPE 2 DIABETES MELLITUS WITHOUT COMPLICATION, WITHOUT LONG-TERM CURRENT USE OF INSULIN (HCC): ICD-10-CM

## 2022-04-21 DIAGNOSIS — Z12.4 CERVICAL CANCER SCREENING: ICD-10-CM

## 2022-04-21 DIAGNOSIS — Z12.31 ENCOUNTER FOR SCREENING MAMMOGRAM FOR BREAST CANCER: ICD-10-CM

## 2022-04-21 DIAGNOSIS — Z11.4 SCREENING FOR HIV (HUMAN IMMUNODEFICIENCY VIRUS): ICD-10-CM

## 2022-04-21 DIAGNOSIS — Z23 ENCOUNTER FOR IMMUNIZATION: ICD-10-CM

## 2022-04-21 DIAGNOSIS — Z11.59 NEED FOR HEPATITIS C SCREENING TEST: ICD-10-CM

## 2022-04-21 DIAGNOSIS — F17.200 TOBACCO DEPENDENCE: ICD-10-CM

## 2022-04-21 DIAGNOSIS — Z76.89 ENCOUNTER TO ESTABLISH CARE WITH NEW DOCTOR: Primary | ICD-10-CM

## 2022-04-21 DIAGNOSIS — L30.9 ECZEMA, UNSPECIFIED TYPE: ICD-10-CM

## 2022-04-21 PROCEDURE — 99204 OFFICE O/P NEW MOD 45 MIN: CPT | Performed by: FAMILY MEDICINE

## 2022-04-21 PROCEDURE — 3074F SYST BP LT 130 MM HG: CPT | Performed by: FAMILY MEDICINE

## 2022-04-21 PROCEDURE — 4004F PT TOBACCO SCREEN RCVD TLK: CPT | Performed by: FAMILY MEDICINE

## 2022-04-21 PROCEDURE — 3725F SCREEN DEPRESSION PERFORMED: CPT | Performed by: FAMILY MEDICINE

## 2022-04-21 PROCEDURE — 3078F DIAST BP <80 MM HG: CPT | Performed by: FAMILY MEDICINE

## 2022-04-21 PROCEDURE — 3008F BODY MASS INDEX DOCD: CPT | Performed by: FAMILY MEDICINE

## 2022-04-21 RX ORDER — NICOTINE 21 MG/24HR
1 PATCH, TRANSDERMAL 24 HOURS TRANSDERMAL EVERY 24 HOURS
Qty: 28 PATCH | Refills: 0 | Status: SHIPPED | OUTPATIENT
Start: 2022-04-21 | End: 2022-06-08 | Stop reason: ALTCHOICE

## 2022-04-21 NOTE — PROGRESS NOTES
Assessment/Plan:      Diagnoses and all orders for this visit:    Encounter to establish care with new doctor    Encounter for screening mammogram for breast cancer  -     Mammo screening bilateral w 3d & cad; Future    Tobacco dependence  Comments:  not mentally ready to quit  Orders:  -     nicotine (NICODERM CQ) 21 mg/24 hr TD 24 hr patch; Place 1 patch on the skin every 24 hours  -     nicotine polacrilex (NICORETTE) 4 mg gum; Chew 1 each (4 mg total) as needed for smoking cessation    Type 2 diabetes mellitus without complication, without long-term current use of insulin (HCC)  Comments:  on sitagliptin only  reports blurry vision with metformin  Cervical cancer screening  -     Ambulatory Referral to Obstetrics / Gynecology; Future    Eczema, unspecified type  Comments:  advised to use emoliient daily and steroid cream only for flares  Orders:  -     halobetasol (ULTRAVATE) 0 05 % cream; Apply topically 2 (two) times a day    Need for hepatitis C screening test    Encounter for immunization  Comments:  wants to wait to get records    Screening for HIV (human immunodeficiency virus)          Return in about 4 weeks (around 5/19/2022) for Annual physical with pcp  The following portions of the patient's history were reviewed and updated as appropriate: allergies, current medications, past family history, past medical history, past social history, past surgical history, and problem list      Subjective:     Patient ID: Lenny Gustafson is a 35 y o  female  HPI    Lenny Gustafson presents today to establish care  Patient doing well today  History was reviewed as below  Here with     Asthma: diagnosed in 25s  Never had PFTs  Was diagnosed when in the hospital for it  Reports albuterol previously prn but was taking once daily  But now for 3 years taking prn and has not needed it at all    Diabetes: diagnosed 2 years ago   Was pregnant and was told she was a diabetic before her pregnancy and never went for follow up  Reports started [place] [facility] [place] 2 years ago after trying metformin  Stopped due to side effects of blurry vision  And has been fine since she stopped it  Tobacco use: 1 ppd x 15 years  nicotine patches and chantix previously  Reports chantix wasn't working and not mentally ready  PHQ-9 Depression Screening    Little interest or pleasure in doing things: 0 - not at all  Feeling down, depressed, or hopeless: 0 - not at all          Past Medical History:   Diagnosis Date    Asthma     Diabetes mellitus ([facility] [address] )        Past Surgical History:   Procedure Laterality Date    ADENOIDECTOMY       SECTION      COSMETIC SURGERY      stomach- lipo and fat transfer    FOOT FRACTURE SURGERY      TYMPANOSTOMY TUBE PLACEMENT      fell out       Family History   Problem Relation Age of Onset    Hypertension Mother     Diabetes Mother     Diabetes Father     No Known Problems Half-Brother     No Known Problems Half-Brother        Social History     Tobacco Use    Smoking status: Current Every Day Smoker     Packs/day: 1 00     Years: 15 00     Pack years: 15 00    Smokeless tobacco: Never Used   Vaping Use    Vaping Use: Never used   Substance Use Topics    Alcohol use: Not Currently    Drug use: Never      Social History     Social History Narrative    Lives in house with  and 2 kids       No Known Allergies    Current Outpatient Medications on File Prior to Visit   Medication Sig Dispense Refill    sitaGLIPtin (JANUVIA) 100 mg tablet Take 100 mg by mouth daily       No current facility-administered medications on file prior to visit  Review of Systems      Objective:    Vitals:    22 1401   BP: 116/74   BP Location: Left arm   Patient Position: Sitting   Pulse: 101   Temp: (!) 96 9 °F (36 1 °C)   SpO2: 98%   Weight: 91 4 kg (201 lb 8 oz)   Height: 5' 4" (1 626 m)         Physical Exam  Vitals and nursing note reviewed     Constitutional:       General: She is not in acute distress  Appearance: Normal appearance  She is not ill-appearing or toxic-appearing  HENT:      Head: Normocephalic and atraumatic  Right Ear: External ear normal       Left Ear: External ear normal    Eyes:      General: No scleral icterus  Right eye: No discharge  Left eye: No discharge  Extraocular Movements: Extraocular movements intact  Conjunctiva/sclera: Conjunctivae normal    Cardiovascular:      Rate and Rhythm: Normal rate and regular rhythm  Heart sounds: Normal heart sounds  No murmur heard  No friction rub  No gallop  Pulmonary:      Effort: Pulmonary effort is normal  No respiratory distress  Breath sounds: Normal breath sounds  No wheezing or rales  Neurological:      Mental Status: She is alert

## 2022-04-22 ENCOUNTER — TELEPHONE (OUTPATIENT)
Dept: ADMINISTRATIVE | Facility: OTHER | Age: 42
End: 2022-04-22

## 2022-04-22 NOTE — TELEPHONE ENCOUNTER
----- Message from Tana Hernandez sent at 4/21/2022  2:14 PM EDT -----  Regarding: mammogram  04/21/22 2:15 PM    Hello, our patient Amarjit Anderson has had Mammogram completed/performed  Please assist in updating the patient chart by making an External outreach to  [address] [facility], [address] [place] [address] located in [place]       Thank you,  Anna RITTER

## 2022-04-22 NOTE — LETTER
Procedure Request Form: Mammogram      Date Requested: 22  Patient: Marinell San [place]into  Patient : 1980   Referring Provider: Chriss Irvin, MD        Date of Procedure ______________________________       The above patient has informed us that they have completed their   most recent Mammogram at your facility  Please complete   this form and attach all corresponding procedure reports/results  Comments __________________________________________________________  ____________________________________________________________________  ____________________________________________________________________  ____________________________________________________________________    Facility Completing Procedure _________________________________________    Form Completed By (print name) _______________________________________      Signature __________________________________________________________      These reports are needed for  compliance  Please fax this completed form and a copy of the procedure report to our office located at [place] [address] as soon as possible to [phone] cornell Woods: Phone [phone]    We thank you for your assistance in treating our mutual patient

## 2022-04-22 NOTE — LETTER
Procedure Request Form: Mammogram      Date Requested: 22  Patient: Darrell Soles  Patient : 1980   Referring Provider: Kerry Kingdom, MD        Date of Procedure ______________________________       The above patient has informed us that they have completed their   most recent Mammogram at your facility  Please complete   this form and attach all corresponding procedure reports/results  Comments __________________________________________________________  ____________________________________________________________________  ____________________________________________________________________  ____________________________________________________________________    Facility Completing Procedure _________________________________________    Form Completed By (print name) _______________________________________      Signature __________________________________________________________      These reports are needed for  compliance  Please fax this completed form and a copy of the procedure report to our office located at [place] [address] as soon as possible to [phone] attention Oneta Epley: Phone [phone]    We thank you for your assistance in treating our mutual patient

## 2022-04-22 NOTE — LETTER
Procedure Request Form: Mammogram      Date Requested: 22  Patient: Jeremiah Strickland  Patient : 1980   Referring Provider: Linda Beckford MD        Date of Procedure ______________________________       The above patient has informed us that they have completed their   most recent Mammogram at your facility  Please complete   this form and attach all corresponding procedure reports/results  Comments __________________________________________________________  ____________________________________________________________________  ____________________________________________________________________  ____________________________________________________________________    Facility Completing Procedure _________________________________________    Form Completed By (print name) _______________________________________      Signature __________________________________________________________      These reports are needed for  compliance  Please fax this completed form and a copy of the procedure report to our office located at [place] [address] as soon as possible to [phone] cornell Gomes: Phone [phone]    We thank you for your assistance in treating our mutual patient

## 2022-04-25 NOTE — TELEPHONE ENCOUNTER
Upon review of the In Basket request and the patient's chart, initial outreach has been made via fax, please see Contacts section for details       Thank you  Michael Reyes

## 2022-04-26 PROBLEM — F17.200 TOBACCO DEPENDENCE: Status: ACTIVE | Noted: 2022-04-26

## 2022-04-26 PROBLEM — L30.9 ECZEMA: Status: ACTIVE | Noted: 2022-04-26

## 2022-05-02 ENCOUNTER — HOSPITAL ENCOUNTER (EMERGENCY)
Facility: HOSPITAL | Age: 42
Discharge: HOME | End: 2022-05-02
Attending: EMERGENCY MEDICINE | Admitting: EMERGENCY MEDICINE
Payer: COMMERCIAL

## 2022-05-02 VITALS
OXYGEN SATURATION: 98 % | DIASTOLIC BLOOD PRESSURE: 90 MMHG | RESPIRATION RATE: 18 BRPM | HEART RATE: 79 BPM | SYSTOLIC BLOOD PRESSURE: 155 MMHG | TEMPERATURE: 98.2 F

## 2022-05-02 DIAGNOSIS — R10.9 ABDOMINAL PAIN: Primary | ICD-10-CM

## 2022-05-02 PROCEDURE — 99284 EMERGENCY DEPT VISIT MOD MDM: CPT | Performed by: EMERGENCY MEDICINE

## 2022-05-02 PROCEDURE — 99284 EMERGENCY DEPT VISIT MOD MDM: CPT

## 2022-05-02 RX ORDER — ONDANSETRON 2 MG/ML
4 INJECTION INTRAMUSCULAR; INTRAVENOUS ONCE
Status: DISCONTINUED | OUTPATIENT
Start: 2022-05-02 | End: 2022-05-02 | Stop reason: HOSPADM

## 2022-05-02 RX ORDER — HYDROMORPHONE HCL/PF 1 MG/ML
0.5 SYRINGE (ML) INJECTION ONCE
Status: DISCONTINUED | OUTPATIENT
Start: 2022-05-02 | End: 2022-05-02 | Stop reason: HOSPADM

## 2022-05-02 NOTE — Clinical Note
Jeremiah Marco A was seen and treated in our emergency department on 5/2/2022  No restrictions        NONE    Diagnosis:     Madhu Hsu  may return to work on return date  She may return on this date: 05/03/2022         If you have any questions or concerns, please don't hesitate to call        Ailin Ralph RN    ______________________________           _______________          _______________  Hospital Representative                              Date                                Time

## 2022-05-02 NOTE — ED PROVIDER NOTES
History  Chief Complaint   Patient presents with    Abdominal Pain     LLQ abdominal pain starting one hour ago  hx of same abdominal pain in past       58-year-old female presents for evaluation of abdominal pain  Patient reports she has had this pain previously although a few hours prior to arrival pain sharply increased  Patient points to her umbilicus as to where the pain is  Pain feels sharp, does radiate down towards her vagina and rectum  Patient reports she does not recall feeling a bulge in these areas  Patient endorses increased burping with the onset of pain, she does not feel bloated  Patient had a smaller than normal bowel movement earlier today that was nonbloody or dark  She denies symptoms of dysuria, last menstrual cycle was the beginning of last month and normal   She does endorse previous C-sections  She took a Tylenol p m  At home without relief in symptoms, she has had no vomiting episodes  Denies upper chest pain or dyspnea  Prior to onset of pain she was eating and drinking normally          Prior to Admission Medications   Prescriptions Last Dose Informant Patient Reported?  Taking?   halobetasol (ULTRAVATE) 0 05 % cream   No No   Sig: Apply topically 2 (two) times a day   nicotine (NICODERM CQ) 21 mg/24 hr TD 24 hr patch   No No   Sig: Place 1 patch on the skin every 24 hours   nicotine polacrilex (NICORETTE) 4 mg gum   No No   Sig: Chew 1 each (4 mg total) as needed for smoking cessation   sitaGLIPtin (JANUVIA) 100 mg tablet   Yes No   Sig: Take 100 mg by mouth daily      Facility-Administered Medications: None       Past Medical History:   Diagnosis Date    Asthma     Diabetes mellitus ([facility] [address] )        Past Surgical History:   Procedure Laterality Date    ADENOIDECTOMY       SECTION      COSMETIC SURGERY      stomach- lipo and fat transfer    FOOT FRACTURE SURGERY      TYMPANOSTOMY TUBE PLACEMENT      fell out       Family History   Problem Relation Age of Onset    Hypertension Mother     Diabetes Mother     Diabetes Father     No Known Problems Half-Brother     No Known Problems Half-Brother      I have reviewed and agree with the history as documented  E-Cigarette/Vaping    E-Cigarette Use Never User      E-Cigarette/Vaping Substances    Nicotine No     THC No     CBD No     Flavoring No     Other No     Unknown No      Social History     Tobacco Use    Smoking status: Current Every Day Smoker     Packs/day: 1 00     Years: 15 00     Pack years: 15 00    Smokeless tobacco: Never Used   Vaping Use    Vaping Use: Never used   Substance Use Topics    Alcohol use: Not Currently    Drug use: Never       Review of Systems   Constitutional: Negative for appetite change, chills and fever  Respiratory: Negative for shortness of breath  Cardiovascular: Negative for chest pain  Gastrointestinal: Positive for abdominal pain and nausea  Negative for blood in stool, constipation, diarrhea and vomiting  Genitourinary: Positive for vaginal pain  Negative for dysuria, vaginal bleeding and vaginal discharge  All other systems reviewed and are negative  Physical Exam  Physical Exam  Vitals reviewed  Constitutional:       General: She is in acute distress (pain)  Appearance: She is not ill-appearing, toxic-appearing or diaphoretic  HENT:      Head: Normocephalic and atraumatic  Right Ear: External ear normal       Left Ear: External ear normal    Eyes:      General:         Right eye: No discharge  Left eye: No discharge  Cardiovascular:      Rate and Rhythm: Normal rate  Pulmonary:      Effort: Pulmonary effort is normal  No respiratory distress  Abdominal:      General: There is no distension  Palpations: Abdomen is soft  Tenderness: There is generalized abdominal tenderness  There is no guarding or rebound  Musculoskeletal:         General: No deformity or signs of injury  Right lower leg: No edema        Left lower leg: No edema  Skin:     General: Skin is warm  Coloration: Skin is not jaundiced or pale  Neurological:      General: No focal deficit present  Mental Status: She is alert  Mental status is at baseline  Vital Signs  ED Triage Vitals [05/02/22 0029]   Temperature Pulse Respirations Blood Pressure SpO2   98 2 °F (36 8 °C) 79 18 155/90 98 %      Temp Source Heart Rate Source Patient Position - Orthostatic VS BP Location FiO2 (%)   Tympanic Monitor Lying Left arm --      Pain Score       10 - Worst Possible Pain           Vitals:    05/02/22 0029   BP: 155/90   Pulse: 79   Patient Position - Orthostatic VS: Lying         Visual Acuity      ED Medications  Medications - No data to display    Diagnostic Studies  Results Reviewed     None                 No orders to display              Procedures  Procedures         ED Course  ED Course as of 05/04/22 0720   Mon May 02, 2022   0141 Patient reports to staff her pain medication has kicked in and she no longer wants to be evaluated  This is a known issue for patient and vitals are within normal  Advised without evaluation we cannot determine if there is anything acute or new happening  She would like to go  Patient reports she recently saw her PCP who provided referrals for OBGYN, GI                                              MDM  Number of Diagnoses or Management Options  Abdominal pain  Diagnosis management comments: 49-year-old female presents for evaluation of abdominal pain  Patient does admit to having this pain previously however over the last few hours pain sharply increased  She has a pain on examination, her abdomen is soft, nondistended with generalized tenderness possibly worsen her umbilicus lower abdomen  Will evaluate with abdominal labs, likely CT imaging  Will attempt to treat symptoms with IV fluids, antiemetics, analgesia        Disposition  Final diagnoses:   Abdominal pain     Time reflects when diagnosis was documented in both MDM as applicable and the Disposition within this note     Time User Action Codes Description Comment    5/2/2022  1:44 AM Winters Yobani Add [R10 9] Abdominal pain       ED Disposition     ED Disposition Condition Date/Time Comment    Discharge Stable Mon May 2, 2022  1:44 AM Lauren Christian discharge to home/self care  Follow-up Information     Follow up With Specialties Details Why Nanci Lopez MD Family Medicine   [address] [place] [address]  [phone]            Discharge Medication List as of 5/2/2022  1:44 AM      CONTINUE these medications which have NOT CHANGED    Details   halobetasol (ULTRAVATE) 0 05 % cream Apply topically 2 (two) times a day, Starting Thu 4/21/2022, Normal      nicotine (NICODERM CQ) 21 mg/24 hr TD 24 hr patch Place 1 patch on the skin every 24 hours, Starting Th 4/21/2022, Normal      nicotine polacrilex (NICORETTE) 4 mg gum Chew 1 each (4 mg total) as needed for smoking cessation, Starting Thu 4/21/2022, Normal      sitaGLIPtin (JANUVIA) 100 mg tablet Take 100 mg by mouth daily, Historical Med             No discharge procedures on file      PDMP Review     None          ED Provider  Electronically Signed by           Emilia Salazar DO  05/04/22 7536

## 2022-05-02 NOTE — ED NOTES
Patient rang call bell stating "my pain medicine kicked in and I want to go home"  Requesting discharge papers and wants to go home        Graham Coy RN  05/02/22 1963

## 2022-05-02 NOTE — Clinical Note
Jayla Vázquez was seen and treated in our emergency department on 5/2/2022  Diagnosis: Abdominal pain    [facility]  may return to work on return date  She may return on this date: 05/03/2022         If you have any questions or concerns, please don't hesitate to call        Lima Weaver DO    ______________________________           _______________          _______________  Hospital Representative                              Date                                Time

## 2022-05-02 NOTE — Clinical Note
Yvonnekai Mars was seen and treated in our emergency department on 5/2/2022  Diagnosis: Abdominal pain    Jackie Walters  may return to work on return date  She may return on this date: 05/03/2022         If you have any questions or concerns, please don't hesitate to call        Rody Rios DO    ______________________________           _______________          _______________  Hospital Representative                              Date                                Time

## 2022-05-02 NOTE — Clinical Note
Darrell Dawson was seen and treated in our emergency department on 5/2/2022  Diagnosis: Abdominal pain    Theodore Llamas  may return to work on return date  She may return on this date: 05/03/2022         If you have any questions or concerns, please don't hesitate to call        Kate Pina,     ______________________________           _______________          _______________  Hospital Representative                              Date                                Time

## 2022-05-03 NOTE — TELEPHONE ENCOUNTER
As a follow-up, a second attempt has been made for outreach via fax, please see Contacts section for details      Thank you  Dwight Bonilla

## 2022-05-23 NOTE — TELEPHONE ENCOUNTER
As a final attempt, a third outreach has been made via fax  Please see Contacts section for details  This encounter will be closed and completed by end of day  Should we receive the requested information because of previous outreach attempts, the requested patient's chart will be updated appropriately       Thank you  Alexander Flannery

## 2022-06-04 ENCOUNTER — APPOINTMENT (EMERGENCY)
Dept: RADIOLOGY | Facility: HOSPITAL | Age: 42
End: 2022-06-04
Payer: OTHER MISCELLANEOUS

## 2022-06-04 ENCOUNTER — HOSPITAL ENCOUNTER (EMERGENCY)
Facility: HOSPITAL | Age: 42
Discharge: HOME | End: 2022-06-05
Attending: EMERGENCY MEDICINE | Admitting: EMERGENCY MEDICINE
Payer: OTHER MISCELLANEOUS

## 2022-06-04 VITALS
RESPIRATION RATE: 16 BRPM | DIASTOLIC BLOOD PRESSURE: 79 MMHG | HEART RATE: 85 BPM | WEIGHT: 193 LBS | SYSTOLIC BLOOD PRESSURE: 122 MMHG | TEMPERATURE: 97.7 F | OXYGEN SATURATION: 98 % | BODY MASS INDEX: 32.95 KG/M2 | HEIGHT: 64 IN

## 2022-06-04 DIAGNOSIS — S50.01XA CONTUSION OF RIGHT ELBOW, INITIAL ENCOUNTER: ICD-10-CM

## 2022-06-04 DIAGNOSIS — S59.901A INJURY OF RIGHT ELBOW, INITIAL ENCOUNTER: Primary | ICD-10-CM

## 2022-06-04 PROCEDURE — 73080 X-RAY EXAM OF ELBOW: CPT

## 2022-06-04 PROCEDURE — 99283 EMERGENCY DEPT VISIT LOW MDM: CPT

## 2022-06-04 PROCEDURE — 99284 EMERGENCY DEPT VISIT MOD MDM: CPT | Performed by: EMERGENCY MEDICINE

## 2022-06-04 RX ORDER — OXYCODONE HYDROCHLORIDE AND ACETAMINOPHEN 5; 325 MG/1; MG/1
1 TABLET ORAL ONCE
Status: COMPLETED | OUTPATIENT
Start: 2022-06-04 | End: 2022-06-05

## 2022-06-04 NOTE — Clinical Note
Miroslava Randhawa was seen and treated in our emergency department on 6/4/2022     ?    ?    ? Diagnosis: ?    Britany Osuna  may return to work on return date  She may return on this date: 06/07/2022    Please excuse absence on 6/3-6/6     If you have any questions or concerns, please don't hesitate to call        Alison Ambrose MD    ______________________________           _______________          _______________  Hospital Representative                              Date                                Time

## 2022-06-04 NOTE — Clinical Note
Marly Kincaid was seen and treated in our emergency department on 6/4/2022  Diagnosis:     Kane Mckenna  may return to work on return date  She may return on this date: 06/07/2022    Please excuse absence on 6/3-6/6     If you have any questions or concerns, please don't hesitate to call        Charlie Rollins MD    ______________________________           _______________          _______________  Hospital Representative                              Date                                Time

## 2022-06-05 RX ORDER — OXYCODONE HYDROCHLORIDE AND ACETAMINOPHEN 5; 325 MG/1; MG/1
1 TABLET ORAL EVERY 6 HOURS PRN
Qty: 10 TABLET | Refills: 0 | Status: SHIPPED | OUTPATIENT
Start: 2022-06-05 | End: 2022-06-08 | Stop reason: ALTCHOICE

## 2022-06-05 RX ADMIN — OXYCODONE AND ACETAMINOPHEN 1 TABLET: 5; 325 TABLET ORAL at 00:23

## 2022-06-05 NOTE — ED PROVIDER NOTES
EMERGENCY DEPARTMENT ENCOUNTER NOTE    This note has been generated using a voice recognition software  There may be typographic, grammatic, or word substitution errors that have escaped editorial review  Emergency Department Note- Amarjit Anderson 35 y o  female MRN: [id number]    Unit/Bed#: GI00 Encounter: [id number]  ? CHIEF COMPLAINT  Chief Complaint   Patient presents with    Elbow Pain     Patient is a  in [place] and states that she hit her R elbow against a steel bar in the bus on ; reports that she was seen at an urgent care in [place] on the day of the injury; reports increased pain especially with movement and extending affected arm       HPI  Amarjit Anderson is a 35 y o  right hand dominant female presenting with right elbow injury  Patient struck her right proximal forearm/elbow on a steel bar on   She felt pain and sought medical attention at an urgent care ([facility]) in [facility] on the same day  X-rays of right elbow revealed no fracture or dislocation and patient was counseled to use ice-packs  Despite this, pain is worsening, the elbow is getting more swollen, and there is pain extending the elbow  Patient has been taking OTC analgesics for pain  It persists  No numbness or tingling in the hand      REVIEW OF SYSTEMS    Constitutional: denies fevers   Cardiac: no chest pain  Respiratory: no shortness of breath   GI: no nausea  MSK: as above  Heme/Onc: no easy bruising  Endocrine: no diabetes  Neuro: no focal weakness or numbness     Ten systems reviewed and negative unless otherwise noted in HPI and above    PAST MEDICAL HISTORY  Past Medical History:   Diagnosis Date    Asthma     Diabetes mellitus ([facility] [address] )        SURGICAL HISTORY  Past Surgical History:   Procedure Laterality Date    ADENOIDECTOMY       SECTION      COSMETIC SURGERY      stomach- lipo and fat transfer    FOOT FRACTURE SURGERY      TYMPANOSTOMY TUBE PLACEMENT      fell out       FAMILY HISTORY  Family History   Problem Relation Age of Onset    Hypertension Mother     Diabetes Mother     Diabetes Father     No Known Problems Half-Brother     No Known Problems Half-Brother         CURRENT MEDICATIONS  No current facility-administered medications on file prior to encounter       Current Outpatient Medications on File Prior to Encounter   Medication Sig    halobetasol (ULTRAVATE) 0 05 % cream Apply topically 2 (two) times a day    nicotine (NICODERM CQ) 21 mg/24 hr TD 24 hr patch Place 1 patch on the skin every 24 hours (Patient not taking: No sig reported)    nicotine polacrilex (NICORETTE) 4 mg gum Chew 1 each (4 mg total) as needed for smoking cessation (Patient not taking: No sig reported)    sitaGLIPtin (JANUVIA) 100 mg tablet Take 100 mg by mouth daily       ALLERGIES  No Known Allergies    SOCIAL HISTORY  Social History     Socioeconomic History    Marital status: Single     Spouse name: None    Number of children: None    Years of education: None    Highest education level: GED or equivalent   Occupational History     Comment:    Tobacco Use    Smoking status: Current Every Day Smoker     Packs/day: 1 00     Years: 15 00     Pack years: 15 00    Smokeless tobacco: Never Used   Vaping Use    Vaping Use: Never used   Substance and Sexual Activity    Alcohol use: Not Currently    Drug use: Never    Sexual activity: Yes     Partners: Male   Other Topics Concern    None   Social History Narrative    Lives in house with  and 2 kids     Social Determinants of Health     Financial Resource Strain: Not on file   Food Insecurity: Not on file   Transportation Needs: Not on file   Physical Activity: Not on file   Stress: Not on file   Social Connections: Not on file   Intimate Partner Violence: Not on file   Housing Stability: Not on file       PHYSICAL EXAM    /79   Pulse 85   Temp 97 7 °F (36 5 °C) (Temporal)   Resp 16   Ht 5' 4" (1 626 m)   Wt 87 5 kg (193 lb)   LMP 05/24/2022 (Approximate)   SpO2 98%   BMI 33 13 kg/m²   Vital signs and nursing notes reviewed      Constitutional:  Awake, alert, oriented  No acute distress  HEENT:  Normocephalic, atraumatic  Sclera anicteric, conjunctiva not injected  Moist oral mucosa  Cardiac:  Appears well-perfused  Respiratory:  Breathing comfortably on room air  Abdomen:  Nondistended  Extremities: Examination of RUE reveals edema to distal upper arm area around the humerus supracondylar area, no ecchymosis  There is tenderness with palpation over proximal elbow at the area between the olecranon and the radiocapitellar joint, without tenderness to palpation over radial head  There is no olecranon bursa fullness  AROM is limited by pain  Patient is able to range her wrist and demonstrate a thumbs up, a-ok signs and spread fingers  Sensation to light touch intact in median, radial and ulnar nn distributions  Integument:  No rashes over exposed areas, cap refill less than 2 seconds  Neurologic:  Awake, alert, and oriented x3  Nonfocal exam   Psychiatric:  Normal affect  ? LABS AND TESTS    Results Reviewed     None          XR elbow 3+ vw RIGHT   Final Result by Maggie Eaton MD (06/05 1321)      No acute osseous abnormality  Workstation performed: [id number]             ED COURSE & MEDICAL DECISION MAKING  Procedures             Medications   oxyCODONE-acetaminophen (PERCOCET) 5-325 mg per tablet 1 tablet (1 tablet Oral Given 6/5/22 0023)     35 y o  female presenting with RUE injury after striking proximal forearm/elbow on a metal bar 3 days ago  There is some objective edema to distal humerus compared to contralateral side  DDx includes occult fracture, contusion, versus another etiology  Nothing to suggest compartment syndrome  Patient has previously had issues with NSAIDs and stomach lining, she has been taking Tylenol instead  X-rays of right elbow obtained, to my review, no acute fracture or dislocation  Ace wrap applied  Recommend rest, ice, compression and follow up with orthopedics  Recommend continuing with Tylenol for pain  I reviewed the [place] Prescription Drug Monitoring Program Database  Based on the information in the database combined with my clinical evaluation, I have determined that a prescription for a short course of a controlled substance may be prescribed  Patient provided with instructions for safe use of the medication and expresses understanding of the side-effects, including possibility of dependence to the medication with resultant sequelae  MDM  Number of Diagnoses or Management Options  Injury of right elbow, initial encounter: new and requires workup     Amount and/or Complexity of Data Reviewed  Tests in the radiology section of CPT®: ordered and reviewed  Independent visualization of images, tracings, or specimens: yes    Patient Progress  Patient progress: stable      CLINICAL IMPRESSION  Final diagnoses:   Injury of right elbow, initial encounter   Contusion of right elbow, initial encounter       DISPOSITION  Time reflects when diagnosis was documented in both MDM as applicable and the Disposition within this note     Time User Action Codes Description Comment    6/5/2022 12:24 AM Reanna Cano [S59 901A] Injury of right elbow, initial encounter     6/5/2022 12:24 AM Reanna Cano [S50 [id number]] Contusion of right elbow, initial encounter       ED Disposition     ED Disposition   Discharge    Condition   Stable    Date/Time   Sun Jun 5, 2022 12:24 AM    Comment   Keisha Gonzalez discharge to home/self care                 Follow-up Information     Follow up With Specialties Details Why Contact Info Additional [address] [facility] [place] [facility] Schedule an appointment as soon as possible for a visit in 3 days  [address] [facility] [id number]  [address] [place] [address] [facility] [place], [address] [place] [address], [place], [place], [id number] [facility] Emergency Department Emergency Medicine Go to  As needed, If symptoms worsen Viviana Emerson [phone]  [facility], [address], [address]   Discharge Medication List as of 6/5/2022 12:39 AM      START taking these medications    Details   oxyCODONE-acetaminophen (Percocet) 5-325 mg per tablet Take 1 tablet by mouth every 6 (six) hours as needed for severe pain for up to 10 days Max Daily Amount: 4 tablets, Starting Sun 6/5/2022, Until Wed 6/15/2022 at 2359, Normal         CONTINUE these medications which have NOT CHANGED    Details   halobetasol (ULTRAVATE) 0 05 % cream Apply topically 2 (two) times a day, Starting Thu 4/21/2022, Normal      nicotine (NICODERM CQ) 21 mg/24 hr TD 24 hr patch Place 1 patch on the skin every 24 hours, Starting Thu 4/21/2022, Normal      nicotine polacrilex (NICORETTE) 4 mg gum Chew 1 each (4 mg total) as needed for smoking cessation, Starting Thu 4/21/2022, Normal      sitaGLIPtin (JANUVIA) 100 mg tablet Take 100 mg by mouth daily, Historical Med              Jeffery Pelaez MD  06/07/22 1143

## 2022-06-05 NOTE — DISCHARGE INSTRUCTIONS
The preliminary read of your x-ray today reveals no evidence of fracture of your elbow  Please apply ice packs for 10 minutes at a time every 6 hours for pain as needed  Use an Ace wrap to help with gentle compression of your right elbow  Please follow-up with orthopedics for further evaluation  Take Tylenol for pain as needed  Since you have had issues with NSAIDs before due to stomach irritation, please be cautious taking ibuprofen or Aleve for pain  These medications do help with inflammation, but may cause you to have stomach irritation and a stomach ulcer  If you do take ibuprofen or Aleve, please take it with a meal or snack  Take Percocet for severe breakthrough pain only  Do not operate a vehicle while taking this medication

## 2022-06-07 ENCOUNTER — OFFICE VISIT (OUTPATIENT)
Dept: FAMILY MEDICINE CLINIC | Facility: CLINIC | Age: 42
End: 2022-06-07
Payer: COMMERCIAL

## 2022-06-07 VITALS
DIASTOLIC BLOOD PRESSURE: 76 MMHG | OXYGEN SATURATION: 100 % | TEMPERATURE: 98.4 F | SYSTOLIC BLOOD PRESSURE: 108 MMHG | HEIGHT: 64 IN | BODY MASS INDEX: 34.04 KG/M2 | HEART RATE: 70 BPM | WEIGHT: 199.38 LBS

## 2022-06-07 DIAGNOSIS — F17.200 TOBACCO DEPENDENCE: ICD-10-CM

## 2022-06-07 DIAGNOSIS — Z23 ENCOUNTER FOR IMMUNIZATION: ICD-10-CM

## 2022-06-07 DIAGNOSIS — Z13.5 SCREENING FOR DIABETIC RETINOPATHY: Primary | ICD-10-CM

## 2022-06-07 DIAGNOSIS — E11.9 TYPE 2 DIABETES MELLITUS WITHOUT COMPLICATION, WITHOUT LONG-TERM CURRENT USE OF INSULIN (HCC): ICD-10-CM

## 2022-06-07 DIAGNOSIS — Z00.00 ANNUAL PHYSICAL EXAM: ICD-10-CM

## 2022-06-07 DIAGNOSIS — Z11.4 SCREENING FOR HIV (HUMAN IMMUNODEFICIENCY VIRUS): ICD-10-CM

## 2022-06-07 DIAGNOSIS — K63.5 POLYP OF COLON, UNSPECIFIED PART OF COLON, UNSPECIFIED TYPE: ICD-10-CM

## 2022-06-07 DIAGNOSIS — R25.2 MUSCLE CRAMPS: ICD-10-CM

## 2022-06-07 DIAGNOSIS — F11.20 CONTINUOUS OPIOID DEPENDENCE (HCC): ICD-10-CM

## 2022-06-07 DIAGNOSIS — Z11.59 NEED FOR HEPATITIS C SCREENING TEST: ICD-10-CM

## 2022-06-07 PROCEDURE — 4004F PT TOBACCO SCREEN RCVD TLK: CPT

## 2022-06-07 PROCEDURE — 99396 PREV VISIT EST AGE 40-64: CPT

## 2022-06-07 PROCEDURE — 92250 FUNDUS PHOTOGRAPHY W/I&R: CPT

## 2022-06-07 PROCEDURE — 3725F SCREEN DEPRESSION PERFORMED: CPT

## 2022-06-07 PROCEDURE — 99214 OFFICE O/P EST MOD 30 MIN: CPT

## 2022-06-07 PROCEDURE — 90471 IMMUNIZATION ADMIN: CPT

## 2022-06-07 PROCEDURE — 90677 PCV20 VACCINE IM: CPT

## 2022-06-07 NOTE — PROGRESS NOTES
[address]    NAME: Madison Benitez  AGE: 35 y o  SEX: female  : 1980     DATE: 2022     Assessment and Plan:     Problem List Items Addressed This Visit        Endocrine    Type 2 diabetes mellitus without complication, without long-term current use of insulin (HCC)    Relevant Orders    Hemoglobin A1C ([facility])    Microalbumin / creatinine urine ratio ([facility])    Diabetic foot exam       Other    Tobacco dependence    Continuous opioid dependence ([facility] [address] )      Other Visit Diagnoses     Screening for diabetic retinopathy    -  Primary    Relevant Orders    IRIS Diabetic eye exam    Need for hepatitis C screening test        Relevant Orders    Hepatitis C Antibody ([facility])    Encounter for immunization        Relevant Orders    Pneumococcal Conjugate Vaccine 20-valent (PCV20) (Completed)    Screening for HIV (human immunodeficiency virus)        Relevant Orders    HIV 1/2 Antigen/Antibody (4th Generation) w Reflex [facility]    Polyp of colon, unspecified part of colon, unspecified type        Relevant Orders    Ambulatory Referral to Gastroenterology    Muscle cramps        Relevant Orders    Basic metabolic panel    Magnesium        Osteoporosis: No history of pathologic fractures  No steroid use, smoking, risk of falls, excessive alcohol use  Will start at age 70 unless otherwise indicated  Current indication: none    Cancer Screening  Cervical Cancer screenin-29 Q3Y with cytology alone, 30-65 Q3Y if just cytology or Q5Y if Cytology with HPV cotesting   History of Abnormal Pap? denies    Last Pap: Referred last visit, no pending visit  Will call with number given        Colon Cancer screenin-65 years old   Family history of colon cancer? Father, 64 diagnosis  Last colonoscopy: polyps- 3 years ago   Every year was recommended for colonoscopy    Breast Cancer screenin-69 years of age every 2 years   Family history of breast cancer? no     Last mammogram: ordered last visit not completed  We scheduled it for her today    Patient does not meet criteria for lung cancer screening: People 49-80 years old with ? 20 pack year history of smoking and smoking cessation < 15 years ago  Immunizations and preventive care screenings were discussed with patient today  Appropriate education was printed on patient's after visit summary  Counseling:  Alcohol/drug use: discussed moderation in alcohol intake, the recommendations for healthy alcohol use, and avoidance of illicit drug use  Dental Health: discussed importance of regular tooth brushing, flossing, and dental visits  Injury prevention: discussed safety/seat belts, safety helmets, smoke detectors, carbon dioxide detectors, and smoking near bedding or upholstery  Sexual health: discussed sexually transmitted diseases, partner selection, use of condoms, avoidance of unintended pregnancy, and contraceptive alternatives  · Exercise: the importance of regular exercise/physical activity was discussed  Recommend exercise 3-5 times per week for at least 30 minutes  Return in about 4 weeks (around 7/5/2022) for Diabetes  Chief Complaint:     Chief Complaint   Patient presents with    Physical Exam      History of Present Illness:     Adult Annual Physical   Patient here for a comprehensive physical exam  The patient reports no problems  Diet and Physical Activity  · Diet/Nutrition: poor diet, limited junk food and limited fruits/vegetables  · Exercise: walking, moderate cardiovascular exercise, 3-4 times a week on average and 2 months        Depression Screening  PHQ-2/9 Depression Screening    Little interest or pleasure in doing things: 0 - not at all  Feeling down, depressed, or hopeless: 0 - not at all  PHQ-2 Score: 0  PHQ-2 Interpretation: Negative depression screen       General Health  · Sleep: sleeps well, gets 4-6 hours of sleep on average and due to her commute to work  · Hearing: normal - bilateral  Reports fluid build up since childhood and had tubes  Thinks they came out  · Vision: most recent eye exam >1 year ago and wears glasses  · Dental: regular dental visits, brushes teeth twice daily and flosses teeth occasionally  /GYN Health  · Patient is: perimenopausal  · Last menstrual period: end of may  · Contraceptive method: none        Review of Systems:     Review of Systems   Past Medical History:     Past Medical History:   Diagnosis Date    Asthma     [facility]       Past Surgical History:     Past Surgical History:   Procedure Laterality Date    ADENOIDECTOMY       SECTION      COSMETIC SURGERY      stomach- lipo and fat transfer    FOOT FRACTURE SURGERY      TYMPANOSTOMY TUBE PLACEMENT      fell out      Social History:     Social History     Socioeconomic History    Marital status: Single     Spouse name: None    Number of children: None    Years of education: None    Highest education level: GED or equivalent   Occupational History     Comment:    Tobacco Use    Smoking status: Current Every Day Smoker     Packs/day: 1 00     Years: 15 00     Pack years: 15 00    Smokeless tobacco: Never Used   Vaping Use    Vaping Use: Never used   Substance and Sexual Activity    Alcohol use: Not Currently    Drug use: Never    Sexual activity: Yes     Partners: Male   Other Topics Concern    None   Social History Narrative    Lives in house with  and 2 kids     Social Determinants of Health     Financial Resource Strain: Not on file   Food Insecurity: Not on file   Transportation Needs: Not on file   Physical Activity: Not on file   Stress: Not on file   Social Connections: Not on file   Intimate Partner Violence: Not on file   Housing Stability: Not on file      Family History:     Family History   Problem Relation Age of Onset    Hypertension Mother     Diabetes Mother     Diabetes Father     No Known Problems Half-Brother     No Known Problems Half-Brother       Current Medications:     Current Outpatient Medications   Medication Sig Dispense Refill    halobetasol (ULTRAVATE) 0 05 % cream Apply topically 2 (two) times a day 50 g 0    sitaGLIPtin (JANUVIA) 100 mg tablet Take 100 mg by mouth daily       No current facility-administered medications for this visit  Allergies:     No Known Allergies   Physical Exam:     /76 (BP Location: Left arm, Patient Position: Sitting, Cuff Size: Large)   Pulse 70   Temp 98 4 °F (36 9 °C) (Tympanic)   Ht 5' 4" (1 626 m)   Wt 90 4 kg (199 lb 6 oz)   LMP 05/24/2022 (Approximate)   SpO2 100%   BMI 34 22 kg/m²     Physical Exam  Vitals and nursing note reviewed  Constitutional:       General: She is not in acute distress  Appearance: Normal appearance  She is well-developed  She is not ill-appearing, toxic-appearing or diaphoretic  HENT:      Head: Normocephalic and atraumatic  Right Ear: Tympanic membrane, ear canal and external ear normal       Left Ear: Tympanic membrane, ear canal and external ear normal       Nose: Nose normal  No congestion or rhinorrhea  Mouth/Throat:      Mouth: Mucous membranes are moist       Pharynx: Oropharynx is clear  No oropharyngeal exudate or posterior oropharyngeal erythema  Eyes:      Extraocular Movements: Extraocular movements intact  Conjunctiva/sclera: Conjunctivae normal       Pupils: Pupils are equal, round, and reactive to light  Neck:      Vascular: No carotid bruit  Cardiovascular:      Rate and Rhythm: Normal rate and regular rhythm  Pulses: no weak pulses          Dorsalis pedis pulses are 2+ on the right side and 2+ on the left side  Posterior tibial pulses are 2+ on the right side and 2+ on the left side  Heart sounds: Normal heart sounds  No murmur heard  No friction rub  No gallop     Pulmonary:      Effort: Pulmonary effort is normal  No respiratory distress  Breath sounds: Normal breath sounds  No wheezing or rales  Abdominal:      General: Bowel sounds are normal       Palpations: Abdomen is soft  Tenderness: There is no abdominal tenderness  There is no guarding  Musculoskeletal:         General: Normal range of motion  Cervical back: Neck supple  No muscular tenderness  Right lower leg: No edema  Left lower leg: No edema  Feet:      Right foot:      Skin integrity: No ulcer, skin breakdown, erythema, warmth, callus or dry skin  Left foot:      Skin integrity: No ulcer, skin breakdown, erythema, warmth, callus or dry skin  Lymphadenopathy:      Cervical: No cervical adenopathy  Skin:     General: Skin is warm and dry  Findings: No lesion  Neurological:      General: No focal deficit present  Mental Status: She is alert  Cranial Nerves: No cranial nerve deficit  Sensory: No sensory deficit  Motor: No weakness  Diabetic Foot Exam    Patient's shoes and socks removed  Right Foot/Ankle   Right Foot Inspection  Skin Exam: skin normal and skin intact  No dry skin, no warmth, no callus, no erythema, no maceration, no abnormal color, no pre-ulcer, no ulcer and no callus  Toe Exam:  no right toe deformity    Sensory   Monofilament testing: intact    Vascular  The right DP pulse is 2+  The right PT pulse is 2+  Left Foot/Ankle  Left Foot Inspection  Skin Exam: skin normal and skin intact  No dry skin, no warmth, no erythema, no maceration, normal color, no pre-ulcer, no ulcer and no callus  Toe Exam: No left toe deformity  Sensory   Monofilament testing: intact    Vascular  The left DP pulse is 2+  The left PT pulse is 2+       Assign Risk Category  No deformity present  No loss of protective sensation  No weak pulses  Risk: 0    Chanda Diaz MD  [id number] [facility]

## 2022-06-08 ENCOUNTER — OFFICE VISIT (OUTPATIENT)
Dept: OBGYN CLINIC | Facility: CLINIC | Age: 42
End: 2022-06-08
Payer: OTHER MISCELLANEOUS

## 2022-06-08 ENCOUNTER — TELEPHONE (OUTPATIENT)
Dept: OBGYN CLINIC | Facility: OTHER | Age: 42
End: 2022-06-08

## 2022-06-08 VITALS
HEART RATE: 84 BPM | BODY MASS INDEX: 34.31 KG/M2 | WEIGHT: 201 LBS | DIASTOLIC BLOOD PRESSURE: 75 MMHG | HEIGHT: 64 IN | SYSTOLIC BLOOD PRESSURE: 106 MMHG

## 2022-06-08 DIAGNOSIS — S50.01XA CONTUSION OF RIGHT ELBOW, INITIAL ENCOUNTER: Primary | ICD-10-CM

## 2022-06-08 DIAGNOSIS — S59.901A INJURY OF RIGHT ELBOW, INITIAL ENCOUNTER: ICD-10-CM

## 2022-06-08 PROCEDURE — 99203 OFFICE O/P NEW LOW 30 MIN: CPT | Performed by: PHYSICIAN ASSISTANT

## 2022-06-08 NOTE — LETTER
June 8, 2022     Patient: Quan Lynne   YOB: 1980   Date of Visit: 6/8/2022       To Whom It May Concern: It is my medical opinion that Quan Lynne should be out of work for a another week with return to work on 06/15/2022  If you have any questions or concerns, please don't hesitate to call           Sincerely,        Julio Cesar Mina PA-C    CC: No Recipients

## 2022-06-08 NOTE — PROGRESS NOTES
35 y o female presents for evaluation of right elbow work related injury  She is right-hand dominant  She works as a  [employer]  Reports she hit her elbow against a steel bar on the bus on 22 went to an urgent care in [place]  She had x-rays there which were negative  She notes increasing pain the elbow despite for x-rays negative for fracture  Therefore she was seen in the emergency department at [facility] 2022 with repeat x-rays date of for fracture  Concerned about doing her job is opening and closing the door she is extending and flexing the elbow  Review of Systems  Review of systems negative unless otherwise specified in HPI    Past Medical History  Past Medical History:   Diagnosis Date    Asthma     Diabetes mellitus ([facility] [address] )      Past Surgical History  Past Surgical History:   Procedure Laterality Date    ADENOIDECTOMY       SECTION      COSMETIC SURGERY      stomach- lipo and fat transfer    FOOT FRACTURE SURGERY      TYMPANOSTOMY TUBE PLACEMENT      fell out     Current Medications  Current Outpatient Medications on File Prior to Visit   Medication Sig Dispense Refill    halobetasol (ULTRAVATE) 0 05 % cream Apply topically 2 (two) times a day 50 g 0    sitaGLIPtin (JANUVIA) 100 mg tablet Take 100 mg by mouth daily      [DISCONTINUED] nicotine (NICODERM CQ) 21 mg/24 hr TD 24 hr patch Place 1 patch on the skin every 24 hours (Patient not taking: No sig reported) 28 patch 0    [DISCONTINUED] nicotine polacrilex (NICORETTE) 4 mg gum Chew 1 each (4 mg total) as needed for smoking cessation (Patient not taking: No sig reported) 100 each 0    [DISCONTINUED] oxyCODONE-acetaminophen (Percocet) 5-325 mg per tablet Take 1 tablet by mouth every 6 (six) hours as needed for severe pain for up to 10 days Max Daily Amount: 4 tablets (Patient not taking: No sig reported) 10 tablet 0     No current facility-administered medications on file prior to visit  Recent Labs [facility])  0   Lab Value Date/Time    HCT 42 6 03/01/2022 2152    HGB 14 6 03/01/2022 2152    WBC 12 92 (H) 03/01/2022 2152     Physical exam  Body mass index is 34 5 kg/m²  · General: Awake, Alert, Oriented  · Eyes: Pupils equal, round and reactive to light  · Heart: regular rate and rhythm  · Lungs: No audible wheezing  · Abdomen: soft  right Elbow  · There is no intra-articular effusion  There is 1 small ecchymotic area distal to the olecranon process approximately one centimeter in diameter  She is unable to locate a specific area pain but does says it is on the back of the elbow  Minimal tenderness with palpation over the distal humerus  Slightly more discomfort over the olecranon  Medial and lateral epicondyles are not painful  Mild discomfort with resisted wrist extension not wrist flexion   strength grossly symmetric  Radial pulses intact  Light touch sensation is intact throughout the upper extremity  There is no long bone deformity  She is able get full flexion and full extension but with some discomfort  Biceps tendon nontender and palpable  No pain over the triceps tendon  Procedure  None today  Imaging  Reviewed previous x-rays and agree with radiologist's reading from the ER on 06/04/2022 which notes no fracture or dislocation no effusion no degenerative changes soft tissues unremarkable  1  Contusion of right elbow, initial encounter    2  Injury of right elbow, initial encounter      Assessment:  right elbow contusion  Plan:  Patient was instructed to use ice 20 minutes 3 to 4 times a day as needed for pain and swelling  She may also use over-the-counter Tylenol or ibuprofen  She is also informed that there is no fracture in even if there was fracture that elbows get stiff quickly and therefore are immobilized for only short amount of time    In the essence of no fracture not immobilize the elbow but have her continue working on range of motion herself  Could consider MRI evaluation for definitive diagnosis and physical therapy for range of motion this would need to be approved for her workman's comp  Would place out of work from today until Monday June 13th when she could return to work  See back in 4 weeks p r n  This note was created with voice recognition software

## 2022-06-08 NOTE — TELEPHONE ENCOUNTER
patient provided name & , asked if this was , I said I can help her      She said she will just go back to the office, she is only a block away, wanted to ask abut the Work Letter    Something about being extended to the

## 2022-06-08 NOTE — PATIENT INSTRUCTIONS

## 2022-06-08 NOTE — LETTER
June 8, 2022     Patient: Esa Stoll   YOB: 1980   Date of Visit: 6/8/2022       To Whom It May Concern: It is my medical opinion that Esa Stoll should remain out of work until return on Monday June 13, 2022  If you have any questions or concerns, please don't hesitate to call           Sincerely,        Chidi Torres PA-C    CC: No Recipients

## 2022-06-08 NOTE — PATIENT INSTRUCTIONS
Patient was instructed to use ice 20 minutes 3 to 4 times a day as needed for pain and swelling  She may also use over-the-counter Tylenol or ibuprofen  She is also informed that there is no fracture in even if there was fracture that elbows get stiff quickly and therefore are immobilized for only short amount of time  In the essence of no fracture not immobilize the elbow but have her continue working on range of motion herself  Could consider MRI evaluation for definitive diagnosis and physical therapy for range of motion this would need to be approved for her workman's comp  Would place out of work from today until Monday June 13th when she could return to work  See back in 4 weeks p r n

## 2022-06-09 LAB
LEFT EYE DIABETIC RETINOPATHY: NORMAL
LEFT EYE IMAGE QUALITY: NORMAL
LEFT EYE MACULAR EDEMA: NORMAL
LEFT EYE OTHER RETINOPATHY: NORMAL
RIGHT EYE DIABETIC RETINOPATHY: NORMAL
RIGHT EYE IMAGE QUALITY: NORMAL
RIGHT EYE MACULAR EDEMA: NORMAL
RIGHT EYE OTHER RETINOPATHY: NORMAL
SEVERITY (EYE EXAM): NORMAL

## 2022-06-09 PROCEDURE — 2025F 7 FLD RTA PHOTO W/O RTNOPTHY: CPT

## 2022-06-13 ENCOUNTER — OFFICE VISIT (OUTPATIENT)
Dept: OBGYN CLINIC | Facility: CLINIC | Age: 42
End: 2022-06-13
Payer: OTHER MISCELLANEOUS

## 2022-06-13 VITALS
BODY MASS INDEX: 34.31 KG/M2 | DIASTOLIC BLOOD PRESSURE: 56 MMHG | HEIGHT: 64 IN | WEIGHT: 201 LBS | SYSTOLIC BLOOD PRESSURE: 117 MMHG | HEART RATE: 92 BPM

## 2022-06-13 DIAGNOSIS — S50.01XD CONTUSION OF RIGHT ELBOW, SUBSEQUENT ENCOUNTER: Primary | ICD-10-CM

## 2022-06-13 PROCEDURE — 99212 OFFICE O/P EST SF 10 MIN: CPT | Performed by: PHYSICIAN ASSISTANT

## 2022-06-13 NOTE — PROGRESS NOTES
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[employer], were reportedly negative for fracture  She had x-rays taken at [facility] after continued pain which were read as negative  Presents today, she is 5 days after last visit, for complaints of continued pain  No reports of numbness or tingling  She already had a work note to be out of work until Wednesday the  with return on Thursday the   She was informed that her last visit that occurred elbow contusion can be tender sometimes due the lack of soft tissue overlying the bone we will get better with time  Review of Systems  Review of systems negative unless otherwise specified in HPI    Past Medical History  Past Medical History:   Diagnosis Date    Asthma     Diabetes mellitus ([facility] [address] )      Past Surgical History  Past Surgical History:   Procedure Laterality Date    ADENOIDECTOMY       SECTION      COSMETIC SURGERY      stomach- lipo and fat transfer    FOOT FRACTURE SURGERY      TYMPANOSTOMY TUBE PLACEMENT      fell out     Current Medications  Current Outpatient Medications on File Prior to Visit   Medication Sig Dispense Refill    halobetasol (ULTRAVATE) 0 05 % cream Apply topically 2 (two) times a day 50 g 0    sitaGLIPtin (JANUVIA) 100 mg tablet Take 100 mg by mouth daily       No current facility-administered medications on file prior to visit  Recent Labs [facility])  0   Lab Value Date/Time    HCT 42 6 2022    HGB 14 6 2022    WBC 12 92 (H) 2022     Physical exam  Body mass index is 34 5 kg/m²     · General: Awake, Alert, Oriented  · Eyes: Pupils equal, round and reactive to light  · Heart: regular rate and rhythm  · Lungs: No audible wheezing  · Abdomen: soft  right Elbow  · There is no ecchymosis  There is no open wounds  There is no intra-articular effusion  There is no long bone deformity  Patient has full flexion  Extension to +2 degrees  She notes some discomfort posteriorly on the olecranon process as her point of maximal tenderness  Notes some discomfort with wrist flexion and extension  There is minimal tenderness over the lateral epicondyle  There is no medial epicondylar pain  No palpable defect of the biceps or triceps tendon  Radial pulses intact  Sensation intact distally  Procedure  None today    Imaging  Previous x-rays were reviewed again see no obvious fracture  Radiologist's read them as no acute osseous abnormality, no joint effusion the no degenerative changes  1  Contusion of right elbow, subsequent encounter      Assessment:  right elbow contusion without obvious fracture on [facility] x-rays or previous [facility] urgent care x-rays  Plan:  MRI has been ordered to rule out fracture or soft tissue injury  Would have the patient follow-up with a hand specialist for their expertise in the area of the elbow MRI results  Patient may do regular duty as of 06/16/2022  Continue to ice and use Tylenol as needed  Continue working on range of motion to prevent elbow stiffness which was also discussed with the patient at her last visit  This note was created with voice recognition software

## 2022-06-13 NOTE — PATIENT INSTRUCTIONS
MRI has been ordered to rule out fracture or soft tissue injury  Would have the patient follow-up with a hand specialist for their expertise in the area of the elbow MRI results  Patient may do regular duty as of 06/16/2022  Continue to ice and use Tylenol as needed  Continue working on range of motion to prevent elbow stiffness which was also discussed with the patient at her last visit

## 2022-06-14 ENCOUNTER — TELEPHONE (OUTPATIENT)
Dept: OBGYN CLINIC | Facility: HOSPITAL | Age: 42
End: 2022-06-14

## 2022-06-14 NOTE — TELEPHONE ENCOUNTER
Patient sees Manuel Lucio is calling to clarify what type of MRI was ordered on patient        Advised MRI ELBOW RIGHT WO CONTRAST

## 2022-06-14 NOTE — TELEPHONE ENCOUNTER
Patient called stating worker's comp told her that the MRI authorization was approved  She will call back with Authorization number  She is also providing number for [facility]  [phone]

## 2022-06-15 ENCOUNTER — TELEPHONE (OUTPATIENT)
Dept: OBGYN CLINIC | Facility: HOSPITAL | Age: 42
End: 2022-06-15

## 2022-06-15 NOTE — TELEPHONE ENCOUNTER
Hello,  Please advise if the following patient can be forced onto the schedule:  Patient:  Kristine Fletcher  :  [birth year]  MRN:  [id number]  INSURANCE:  [facility]  Call back #: [phone]  Reason for appointment:  Queta Fernandez is referring her to hand specialist for rt elbow  Requested doctor/location:  Discuss options with her  Thank you in advance!

## 2022-06-16 ENCOUNTER — TELEPHONE (OUTPATIENT)
Dept: OBGYN CLINIC | Facility: HOSPITAL | Age: 42
End: 2022-06-16

## 2022-06-20 ENCOUNTER — TELEPHONE (OUTPATIENT)
Dept: OBGYN CLINIC | Facility: HOSPITAL | Age: 42
End: 2022-06-20

## 2022-06-20 NOTE — TELEPHONE ENCOUNTER
Patient is calling to find out if the MRI results have been received  Patient s supposed to return to work Wednesday 06/22/2022 morning  Patient has the CD and will bring it to the office, if needed  Please advise      Daniel Deleon [phone]

## 2022-06-23 ENCOUNTER — OFFICE VISIT (OUTPATIENT)
Dept: OBGYN CLINIC | Facility: CLINIC | Age: 42
End: 2022-06-23
Payer: OTHER MISCELLANEOUS

## 2022-06-23 VITALS
WEIGHT: 201 LBS | SYSTOLIC BLOOD PRESSURE: 100 MMHG | DIASTOLIC BLOOD PRESSURE: 62 MMHG | HEART RATE: 84 BPM | HEIGHT: 64 IN | OXYGEN SATURATION: 99 % | BODY MASS INDEX: 34.31 KG/M2

## 2022-06-23 DIAGNOSIS — M77.11 LATERAL EPICONDYLITIS OF RIGHT ELBOW: ICD-10-CM

## 2022-06-23 PROCEDURE — 3008F BODY MASS INDEX DOCD: CPT

## 2022-06-23 PROCEDURE — 99244 OFF/OP CNSLTJ NEW/EST MOD 40: CPT | Performed by: ORTHOPAEDIC SURGERY

## 2022-06-23 NOTE — PROGRESS NOTES
[address]  [address] [facility] [address]  [facility]  [id number]  1980    ORTHOPAEDIC SURGERY OUTPATIENT NOTE  2022      HISTORY:  35 y o  RHD female who presents to the office for evaluation and treatment of right elbow pain  Patient states that she is a  and after making a turn, hit this arm on a metal bar in the bus on 22  This is a workman's compensation case  Patient saw our partner, Sonal Bauman PA-C who had the patient perform a HEP  She's also tried icing the elbow and using Tylenol and Motrin  Since she continued to have pain, an MRI was ordered and patient presents today for follow up of this  She describes her pain as a deep lateral/posterior elbow pain  No mechanical symptoms  Has pain when lifting things, with wrist motion and when trying to extend the elbow  She denies n/t  She has remained out of work since her injury      Past Medical History:   Diagnosis Date    Asthma     Diabetes mellitus ([facility] [address] )        Past Surgical History:   Procedure Laterality Date    ADENOIDECTOMY       SECTION      COSMETIC SURGERY      stomach- lipo and fat transfer    FOOT FRACTURE SURGERY      TYMPANOSTOMY TUBE PLACEMENT      fell out       Social History     Socioeconomic History    Marital status: Single     Spouse name: Not on file    Number of children: Not on file    Years of education: Not on file    Highest education level: GED or equivalent   Occupational History     Comment:    Tobacco Use    Smoking status: Current Every Day Smoker     Packs/day: 1 00     Years: 15 00     Pack years: 15 00    Smokeless tobacco: Never Used   Vaping Use    Vaping Use: Never used   Substance and Sexual Activity    Alcohol use: Not Currently    Drug use: Never    Sexual activity: Yes     Partners: Male   Other Topics Concern    Not on file   Social History Narrative Lives in house with  and 2 kids     Social Determinants of Health     Financial Resource Strain: Not on file   Food Insecurity: Not on file   Transportation Needs: Not on file   Physical Activity: Not on file   Stress: Not on file   Social Connections: Not on file   Intimate Partner Violence: Not on file   Housing Stability: Not on file       Family History   Problem Relation Age of Onset    Hypertension Mother     Diabetes Mother     Diabetes Father     No Known Problems Half-Brother     No Known Problems Half-Brother         Patient's Medications   New Prescriptions    DICLOFENAC SODIUM (VOLTAREN) 1 %    Apply 2 g topically 4 (four) times a day   Previous Medications    HALOBETASOL (ULTRAVATE) 0 05 % CREAM    Apply topically 2 (two) times a day    SITAGLIPTIN (JANUVIA) 100 MG TABLET    Take 100 mg by mouth daily   Modified Medications    No medications on file   Discontinued Medications    No medications on file       No Known Allergies     /62   Pulse 84   Ht 5' 4" (1 626 m)   Wt 91 2 kg (201 lb)   LMP 05/24/2022 (Approximate)   SpO2 99%   BMI 34 50 kg/m²      REVIEW OF SYSTEMS:  Constitutional: Negative  HEENT: Negative  Respiratory: Negative  Skin: Negative  Neurological: Negative  Psychiatric/Behavioral: Negative  Musculoskeletal: Negative except for that mentioned in the HPI      PHYSICAL EXAM:      R elbow:  Flexion: 120 degrees  Extension: 5 degrees  Pronation: 80 degrees  Supination: 80 degrees    TTP Lateral Epicondyle: positive  TTP PIN: Mildly positive  TTP Medial Epicondyle: negative  TTP Olecranon: negative  TTP Radial Head: negative  TTP Biceps Tendon: negative    Pain with resisted wrist extension: Positive  Pain with resisted 3rd finger extension: Positive  Pain with resisted wrist flexion: negative    Varus laxity: negative  Valgus laxity: negative    Cubital tunnel Tinel's: negative    Radial/median/ulnar nerve intact    <2 sec cap refill      Integumentary:   Bruising: - none  Abrasion: - none  Rash - none  Laceration: - none      IMAGING: MRI report from outside facility was reviewed and demonstrates common extensor tendonitis/tendinopathy  ASSESSMENT AND PLAN:  35 y o  female with right lateral epicondylitis  We discussed patient's MRI results and the pathology of this diagnosis with the patient today  At this time, she will be fitted for a tennis elbow strap to wear with activity during the day  In addition, we will have her start formal therapy program to work on stretching/strengthening program  In addition, voltaren gel will be prescribed to use as needed for pain  We'll have the patient follow up in 6 weeks for reevaluation       Scribe Attestation    I,:  Jerman Wong PA-C am acting as a scribe while in the presence of the attending physician :       I,:  Mihai You personally performed the services described in this documentation    as scribed in my presence :

## 2022-06-23 NOTE — LETTER
June 23, 2022     Patient: Gabriella Horn  YOB: 1980  Date of Visit: 6/23/2022      To Whom it May Concern:    Gabriella Horn is under my professional care  Wing was seen in my office on 6/23/2022  Wing should remain out of work at this time  Work status will be reevaluated at her follow up in 6 weeks  If you have any questions or concerns, please don't hesitate to call           Sincerely,          Makeda Burgos        CC: No Recipients

## 2022-06-29 ENCOUNTER — EVALUATION (OUTPATIENT)
Dept: PHYSICAL THERAPY | Facility: HOME HEALTHCARE | Age: 42
End: 2022-06-29
Payer: OTHER MISCELLANEOUS

## 2022-06-29 DIAGNOSIS — M77.11 LATERAL EPICONDYLITIS OF RIGHT ELBOW: Primary | ICD-10-CM

## 2022-06-29 PROCEDURE — 97140 MANUAL THERAPY 1/> REGIONS: CPT

## 2022-06-29 PROCEDURE — 97162 PT EVAL MOD COMPLEX 30 MIN: CPT

## 2022-06-29 PROCEDURE — 97110 THERAPEUTIC EXERCISES: CPT

## 2022-06-29 NOTE — PROGRESS NOTES
PT Evaluation     Today's date: 2022  Patient name: Lauren Christian  : 1980  MRN: [id number]  Referring provider: Krysten Silva PA-C  Dx:   Encounter Diagnosis     ICD-10-CM    1  Lateral epicondylitis of right elbow  M77 11        Start Time: 0700  Stop Time: 0745  Total time in clinic (min): 45 minutes    Assessment  Assessment details: Pt is a 34 y/o female presenting with acute R elbow pain consistent with diagnosis of R lateral epicondylitis  Pt is presenting with significant pain levels, elbow and wrist ROM deficits, strength deficits, and decreased overall functional mobility, which is affecting her ability to perform ADLs and her ability to perform work duties  Pt requires skilled PT in order to improve in pain, strength, ROM, functional mobility, quality of life, and return to PLOF  Thank you! Impairments: abnormal or restricted ROM, abnormal movement, activity intolerance, impaired physical strength, lacks appropriate home exercise program, pain with function, poor posture  and poor body mechanics  Understanding of Dx/Px/POC: good   Prognosis: good    Goals  STG: 3-4 weeks  Pt will be independent with HEP in order to continue to progress outside of PT treatment sessions  Pt will improve in quality of life as demonstrated by worst pain levels of 5/10 or less  Pt will improve in functional mobility as demonstrated by 120 degrees or greater of elbow flex AROM  LT-8 weeks  Pt will improve in quality of life as demonstrated by worst pain levels of 2/10 or less  Pt will improve in functional mobility as demonstrated by strength levels of 4+/5 or greater  Pt will improve in functional mobility as demonstrated by R elbow AROM WFL  Pt will improve in functional mobility as demonstrated by R wrist AROM WFL        Plan  Patient would benefit from: skilled physical therapy  Planned modality interventions: cryotherapy, low level laser therapy and thermotherapy: hydrocollator packs  Planned therapy interventions: body mechanics training, functional ROM exercises, home exercise program, flexibility, joint mobilization, manual therapy, massage, neuromuscular re-education, patient education, postural training, strengthening, stretching, therapeutic activities and therapeutic exercise  Frequency: 2x week  Duration in weeks: 4  Plan of Care beginning date: 6/29/2022  Plan of Care expiration date: 7/29/2022  Treatment plan discussed with: patient        Subjective Evaluation    History of Present Illness  Mechanism of injury: Pt is presenting to OPPT after hitting her R elbow on a metal bar while at work  Pt is currently employed as a   She is presenting with pain in her lateral elbow and in her triceps on the R side  The injury occurred on 06/01/2022  Pt reports that her elbow has not improved at all, but in fact has gotten worse  Pt reports that the pain is all the time, but that it hurts the most when she moves it  It also worsens throughout the day as she moves it  Pt reports that she feels like her whole hand feels weak because of the pain  Pt reports that she has some stiffness in her elbow when she wakes up in the morning, but denies any numbness or tingling  Pain  Current pain rating: 3  At best pain rating: 3  At worst pain rating: 10  Quality: sharp and throbbing  Relieving factors: medications  Aggravating factors: lifting (moving the elbow)      Diagnostic Tests  X-ray: normal  Patient Goals  Patient goals for therapy: decreased pain, increased motion, increased strength, independence with ADLs/IADLs and return to work  Patient goal: To get arm back to 100%        Objective     Palpation     Right   Tenderness of the triceps and wrist extensors       Neurological Testing     Sensation     Elbow   Left Elbow   Intact: light touch    Right Elbow   Intact: light touch    Active Range of Motion   Left Shoulder   Flexion: WFL  Abduction: WFL  External rotation [facility]: [facility] rotation BTB: WFL    Right Shoulder   Flexion: WFL  Abduction: WFL  External rotation BTH: WFL  Internal rotation BTB: WFL    Left Elbow   Flexion: WFL  Extension: WFL    Right Elbow   Flexion: 90 degrees with pain  Extension: -35 degrees with pain    Left Wrist   Wrist flexion: 56 degrees   Wrist extension: 58 degrees     Right Wrist   Wrist flexion: 30 degrees WFL  Wrist extension: 48 degrees     Strength/Myotome Testing   Cervical Spine     Left   Interossei strength (t1): 5    Right   Interossei strength (t1): 4    Left Elbow   Flexion: 5  Extension: 5    Right Elbow   Flexion: 4  Extension: 3+    Left Wrist/Hand   Wrist extension: 5  Wrist flexion: 5  Radial deviation: 5  Ulnar deviation: 5     (2nd hand position)     Trial 1: 55    Trial 2: 50    Trial 3: 47 5    Right Wrist/Hand   Wrist extension: 3  Wrist flexion: 4-  Radial deviation: 4-  Ulnar deviation: 3+     (2nd hand position)     Trial 1: 22 5    Trial 2: 17 5    Trial 3: 12 5    Tests     Right Elbow   Positive Cozen's                Precautions: None    Re-eval Date: 7/29/2022      Manuals 6/29            L elbow and wrist PROM 8'            IASTM lateral elbow                                       Neuro Re-Ed             MTP/LTP             Triceps extension TB or matrix                          Ther Ex             UBE             Wrist flex stretch 3x20"            Wrist flex/ext AROM 1x10            Wrist UD/RD AROM             Wrist supination/pronation AROM 1x10            Elbow flex/ext AROM 1x10            Eccentric wrist extension             Digiflex nv            Therabar             Putty: , roll, pinch             Power web             Ther Activity                                       Gait Training                                       Modalities             [facility]

## 2022-07-01 ENCOUNTER — OFFICE VISIT (OUTPATIENT)
Dept: PHYSICAL THERAPY | Facility: HOME HEALTHCARE | Age: 42
End: 2022-07-01
Payer: OTHER MISCELLANEOUS

## 2022-07-01 DIAGNOSIS — M77.11 LATERAL EPICONDYLITIS OF RIGHT ELBOW: Primary | ICD-10-CM

## 2022-07-01 PROCEDURE — 97112 NEUROMUSCULAR REEDUCATION: CPT

## 2022-07-01 PROCEDURE — 97140 MANUAL THERAPY 1/> REGIONS: CPT

## 2022-07-01 PROCEDURE — 97110 THERAPEUTIC EXERCISES: CPT

## 2022-07-01 NOTE — PROGRESS NOTES
Daily Note     Today's date: 2022  Patient name: Tabitha Lei  : 1980  MRN: [id number]  Referring provider: Luis Keith PA-C  Dx:   Encounter Diagnosis     ICD-10-CM    1  Lateral epicondylitis of right elbow  M77 11        Start Time: 830  Stop Time: 915  Total time in clinic (min): 45 minutes    Subjective: Pt reports that she has a 3/10 R elbow pain today  Objective: See treatment diary below      Assessment: Pt demonstrated significantly improved elbow and wrist flexion and extension AROM today since previous session  She was able to achieve nearly full elbow flex and ext when performing MTP exercise  With passive stretching, pt does continue to have moderate elbow and wrist ROM deficits, primarily limited by pain levels  Pt would benefit from continued PT  Plan: Continue per plan of care        Precautions: None    Re-eval Date: 2022      Manuals  7/1           L elbow and wrist PROM 8' 10'           IASTM lateral elbow                                       Neuro Re-Ed             MTP/LTP  MTP Red 2x10           Triceps extension TB or matrix                          Ther Ex             UBE  5'           Wrist flex stretch 3x20" 3x20"           Wrist flex/ext AROM 1x10 2x10           Wrist UD/RD AROM             Wrist supination/pronation AROM 1x10 2x10           Elbow flex/ext AROM 1x10 2x10           Eccentric wrist extension  1# 2x10           Digiflex nv Green 4x5           Therabar             Putty: , roll, pinch             Power web             Ther Activity                                       Gait Training                                       Modalities             [facility]

## 2022-07-06 ENCOUNTER — OFFICE VISIT (OUTPATIENT)
Dept: PHYSICAL THERAPY | Facility: HOME HEALTHCARE | Age: 42
End: 2022-07-06
Payer: OTHER MISCELLANEOUS

## 2022-07-06 ENCOUNTER — TELEPHONE (OUTPATIENT)
Dept: OBGYN CLINIC | Facility: HOSPITAL | Age: 42
End: 2022-07-06

## 2022-07-06 DIAGNOSIS — M77.11 LATERAL EPICONDYLITIS OF RIGHT ELBOW: Primary | ICD-10-CM

## 2022-07-06 PROCEDURE — 97110 THERAPEUTIC EXERCISES: CPT

## 2022-07-06 PROCEDURE — 97140 MANUAL THERAPY 1/> REGIONS: CPT

## 2022-07-06 PROCEDURE — 97112 NEUROMUSCULAR REEDUCATION: CPT

## 2022-07-06 NOTE — TELEPHONE ENCOUNTER
DR Lawrence Gomes  RE: Michael Daily updated work note with restrictions  CB# [id number] from [employer] called asking for and updated work note with some type of restrictions she can find her something to do at work  Updated work letter can be faxed to [employer]  Please include claim number when faxing note        Padmini Bautista at [employer]  FAX# [phone]  Claim #: [id number]

## 2022-07-06 NOTE — PROGRESS NOTES
Daily Note     Today's date: 2022  Patient name: Jody Perez  : 1980  MRN: [id number]  Referring provider: Dajuan Briscoe PA-C  Dx: No diagnosis found  Subjective: I have constant pain at my elbow  Objective: See treatment diary below    Assessment: Tolerated treatment fair  Pt with pain evident with most ex but especially with supination and elbow ext movements  Pt reports use of CP at home for pain relief  She also has a home TENS unit that she plans to try for pain relief  Patient would benefit from continued PT    Plan: Continue per plan of care        Precautions: None    Re-eval Date: 2022      Manuals  7-6          L elbow and wrist PROM 8' 10' 10'          IASTM lateral elbow                                       Neuro Re-Ed             MTP/LTP  MTP Red 2x10 MTP  /LTP  Red  2x10          Triceps extension TB or matrix                          Ther Ex   7-6          UBE  5' 7' alt          Wrist flex stretch 3x20" 3x20" 3x20"          Wrist flex/ext AROM 1x10 2x10 2x10          Wrist UD/RD AROM             Wrist supination/pronation AROM 1x10 2x10 2x10          Elbow flex/ext AROM 1x10 2x10 2x10          Eccentric wrist extension  1# 2x10 1#   2x10          Digiflex nv Green 4x5 Green  4x5          Therabar             Putty: , roll, pinch             Power web             Ther Activity                                       Gait Training                                       Modalities             P

## 2022-07-08 ENCOUNTER — OFFICE VISIT (OUTPATIENT)
Dept: PHYSICAL THERAPY | Facility: HOME HEALTHCARE | Age: 42
End: 2022-07-08
Payer: OTHER MISCELLANEOUS

## 2022-07-08 DIAGNOSIS — M77.11 LATERAL EPICONDYLITIS OF RIGHT ELBOW: Primary | ICD-10-CM

## 2022-07-08 PROCEDURE — 97140 MANUAL THERAPY 1/> REGIONS: CPT

## 2022-07-08 PROCEDURE — 97110 THERAPEUTIC EXERCISES: CPT

## 2022-07-08 PROCEDURE — 97112 NEUROMUSCULAR REEDUCATION: CPT

## 2022-07-08 NOTE — PROGRESS NOTES
Daily Note     Today's date: 2022  Patient name: Vandana Cadet  : 1980  MRN: [id number]  Referring provider: Andrew Mccain PA-C  Dx:   Encounter Diagnosis     ICD-10-CM    1  Lateral epicondylitis of right elbow  M77 11        Start Time: 1000  Stop Time: 1039  Total time in clinic (min): 39 minutes    Subjective: Pt reports that she thinks her elbow is getting worse and that it was really sore over the  weekend  Objective: See treatment diary below      Assessment: Pt continues to demonstrate improvements in R elbow and wrist AROM, however, she continues to experience significant pain levels and ROM deficits with PROM  Pt also demonstrated significant difficulty with digiflex due to pain and weakness  Pt would benefit from continued PT  Plan: Continue per plan of care        Precautions: None    Re-eval Date: 2022      Manuals  7-6 7/8         L elbow and wrist PROM 8' 10' 10' 8'         IASTM lateral elbow                                       Neuro Re-Ed             MTP/LTP  MTP Red 2x10 MTP  /LTP  Red  2x10 MTP/LTP Green 2x10         Triceps extension TB or matrix                          Ther Ex   7-6          UBE  5' 7' alt 7' alt         Wrist flex stretch 3x20" 3x20" 3x20" 3x20"         Wrist flex/ext AROM 1x10 2x10 2x10 2x10         Wrist UD/RD AROM             Wrist supination/pronation AROM 1x10 2x10 2x10 2x10         Elbow flex/ext AROM 1x10 2x10 2x10 2x10         Eccentric wrist extension  1# 2x10 1#   2x10 1# 2x10         Digiflex nv Green 4x5 Green  4x5 Green 4x5         Therabar             Putty: , roll, pinch             Power web             Ther Activity                                       Gait Training                                       Modalities             [facility]

## 2022-07-11 ENCOUNTER — APPOINTMENT (OUTPATIENT)
Dept: PHYSICAL THERAPY | Facility: HOME HEALTHCARE | Age: 42
End: 2022-07-11
Payer: OTHER MISCELLANEOUS

## 2022-07-14 ENCOUNTER — OFFICE VISIT (OUTPATIENT)
Dept: PHYSICAL THERAPY | Facility: HOME HEALTHCARE | Age: 42
End: 2022-07-14
Payer: OTHER MISCELLANEOUS

## 2022-07-14 DIAGNOSIS — M77.11 LATERAL EPICONDYLITIS OF RIGHT ELBOW: Primary | ICD-10-CM

## 2022-07-14 PROCEDURE — 97140 MANUAL THERAPY 1/> REGIONS: CPT

## 2022-07-14 PROCEDURE — 97110 THERAPEUTIC EXERCISES: CPT

## 2022-07-14 PROCEDURE — 97112 NEUROMUSCULAR REEDUCATION: CPT

## 2022-07-14 NOTE — PROGRESS NOTES
Daily Note     Today's date: 2022  Patient name: Keiry Villa  : 1980  MRN: [id number]  Referring provider: Chandler Schwab PA-C  Dx: No diagnosis found  Start Time: 1345          Subjective: Pain of about 7/10 today but more of a locking sensation when straightening my elbow  I am returning to work at [employer] but I don't have a return to work date yet  Objective: See treatment diary below    Assessment: Tolerated treatment well  Jesse Neighbours evident with most ex, but pt reports most pain at R elbow during elbow extension and with forearm pronation combined movements  Pt with ROM limitations in all movements  Pt to use CP at home as needed  Patient would benefit from continued PT    Plan: Continue per plan of care        Precautions: None    Re-eval Date: 2022      Manuals -        L elbow and wrist PROM 8' 10' 10' 8' 8'        IASTM lateral elbow                                       Neuro Re-Ed             MTP/LTP  MTP Red 2x10 MTP  /LTP  Red  2x10 MTP/LTP Green 2x10 MTP/LTP  Green  2x10         Triceps extension TB or matrix                          Ther Ex           UBE  5' 7' alt 7' alt 8' alt        Wrist flex stretch 3x20" 3x20" 3x20" 3x20" 3x20'        Wrist flex/ext AROM 1x10 2x10 2x10 2x10 2x10        Wrist UD/RD AROM             Wrist supination/pronation AROM 1x10 2x10 2x10 2x10 2x10        Elbow flex/ext AROM 1x10 2x10 2x10 2x10 2x10        Eccentric wrist extension  1# 2x10 1#   2x10 1# 2x10 1#  2x10        Digiflex nv Green 4x5 Green  4x5 Green 4x5 Green  4x5        Therabar             Putty: , roll, pinch             Power web             Ther Activity                                       Gait Training                                       Modalities             [facility]

## 2022-07-15 ENCOUNTER — APPOINTMENT (OUTPATIENT)
Dept: PHYSICAL THERAPY | Facility: HOME HEALTHCARE | Age: 42
End: 2022-07-15
Payer: OTHER MISCELLANEOUS

## 2022-07-18 ENCOUNTER — OFFICE VISIT (OUTPATIENT)
Dept: PHYSICAL THERAPY | Facility: HOME HEALTHCARE | Age: 42
End: 2022-07-18
Payer: OTHER MISCELLANEOUS

## 2022-07-18 DIAGNOSIS — M77.11 LATERAL EPICONDYLITIS OF RIGHT ELBOW: Primary | ICD-10-CM

## 2022-07-18 PROCEDURE — 97110 THERAPEUTIC EXERCISES: CPT

## 2022-07-18 PROCEDURE — 97112 NEUROMUSCULAR REEDUCATION: CPT

## 2022-07-18 NOTE — PROGRESS NOTES
Daily Note     Today's date: 2022  Patient name: Ky Vidal  : 1980  MRN: [id number]  Referring provider: Ingrid Malone PA-C  Dx:   Encounter Diagnosis     ICD-10-CM    1  Lateral epicondylitis of right elbow  M77 11        Start Time: 1345  Stop Time: 1423  Total time in clinic (min): 38 minutes    Subjective: Patient reports, "3/10" pain prior to therapy  Objective: See treatment diary below    Assessment: Patient appeared to have pain t/o exercise program listed below  However, was able to complete TE  Elbow ext and flexion both limited by pain  Continue to progress as tolerated  Plan: Continue per plan of care        Precautions: None    Re-eval Date: 2022    Manuals        R elbow and wrist PROM 8' 10' 10' 8' 8' 8'       IASTM lateral elbow                                       Neuro Re-Ed             MTP/LTP  MTP Red 2x10 MTP  /LTP  Red  2x10 MTP/LTP Green 2x10 MTP/LTP  Green  2x10  MTP/LTP  Green  2x10        Triceps extension TB or matrix                          Ther Ex           UBE  5' 7' alt 7' alt 8' alt 8' alt       Wrist flex stretch 3x20" 3x20" 3x20" 3x20" 3x20' 3x20"       Wrist flex/ext AROM 1x10 2x10 2x10 2x10 2x10 2x10       Wrist UD/RD AROM             Wrist supination/pronation AROM 1x10 2x10 2x10 2x10 2x10 2x10       Elbow flex/ext AROM 1x10 2x10 2x10 2x10 2x10 2x10       Eccentric wrist extension  1# 2x10 1#   2x10 1# 2x10 1#  2x10 1#  2x10       Digiflex nv Green 4x5 Green  4x5 Green 4x5 Green  4x5 Green  4x5       Therabar             Putty: , roll, pinch             Power web             Ther Activity                                       Gait Training                                       Modalities             [facility]

## 2022-07-21 ENCOUNTER — APPOINTMENT (OUTPATIENT)
Dept: PHYSICAL THERAPY | Facility: HOME HEALTHCARE | Age: 42
End: 2022-07-21
Payer: OTHER MISCELLANEOUS

## 2022-07-22 ENCOUNTER — OFFICE VISIT (OUTPATIENT)
Dept: PHYSICAL THERAPY | Facility: HOME HEALTHCARE | Age: 42
End: 2022-07-22
Payer: OTHER MISCELLANEOUS

## 2022-07-22 DIAGNOSIS — M77.11 LATERAL EPICONDYLITIS OF RIGHT ELBOW: Primary | ICD-10-CM

## 2022-07-22 PROCEDURE — 97140 MANUAL THERAPY 1/> REGIONS: CPT

## 2022-07-22 PROCEDURE — 97110 THERAPEUTIC EXERCISES: CPT

## 2022-07-22 PROCEDURE — 97112 NEUROMUSCULAR REEDUCATION: CPT

## 2022-07-22 NOTE — PROGRESS NOTES
Daily Note     Today's date: 2022  Patient name: Cirilo Polanco  : 1980  MRN: [id number]  Referring provider: Clau Loera PA-C  Dx:   Encounter Diagnosis     ICD-10-CM    1  Lateral epicondylitis of right elbow  M77 11                   Subjective: Pt reports her R elbow is stiff this morning  Objective: See treatment diary below      Assessment: Tolerated treatment fairly well  Pt reports stiffness and soreness R elbow with exercise  ROM deficits noted with R elbow flexion and extension  Patient would benefit from continued PT      Plan: Continue per plan of care        Precautions: None    Re-eval Date: 2022    Manuals -      R elbow and wrist PROM 8' 10' 10' 8' 8' 8' 8'      IASTM lateral elbow                                       Neuro Re-Ed             MTP/LTP  MTP Red 2x10 MTP  /LTP  Red  2x10 MTP/LTP Green 2x10 MTP/LTP  Green  2x10  MTP/LTP  Green  2x10  MTP/LTP  Green  2x10      Triceps extension TB or matrix                          Ther Ex   -        UBE  5' 7' alt 7' alt 8' alt 8' alt 8' alt       Wrist flex stretch 3x20" 3x20" 3x20" 3x20" 3x20' 3x20" 3x20"      Wrist flex/ext AROM 1x10 2x10 2x10 2x10 2x10 2x10 2x10      Wrist UD/RD AROM             Wrist supination/pronation AROM 1x10 2x10 2x10 2x10 2x10 2x10 2x10      Elbow flex/ext AROM 1x10 2x10 2x10 2x10 2x10 2x10 2x10      Eccentric wrist extension  1# 2x10 1#   2x10 1# 2x10 1#  2x10 1#  2x10 1#  2x10      Digiflex nv Green 4x5 Green  4x5 Green 4x5 Green  4x5 Green  4x5 Green  4 x5      Therabar             Putty: , roll, pinch             Power web             Ther Activity                                       Gait Training                                       Modalities             [facility]

## 2022-07-25 ENCOUNTER — OFFICE VISIT (OUTPATIENT)
Dept: PHYSICAL THERAPY | Facility: HOME HEALTHCARE | Age: 42
End: 2022-07-25
Payer: OTHER MISCELLANEOUS

## 2022-07-25 DIAGNOSIS — M77.11 LATERAL EPICONDYLITIS OF RIGHT ELBOW: Primary | ICD-10-CM

## 2022-07-25 PROCEDURE — 97140 MANUAL THERAPY 1/> REGIONS: CPT

## 2022-07-25 PROCEDURE — 97112 NEUROMUSCULAR REEDUCATION: CPT

## 2022-07-25 PROCEDURE — 97110 THERAPEUTIC EXERCISES: CPT

## 2022-07-25 NOTE — PROGRESS NOTES
Daily Note     Today's date: 2022  Patient name: Thomas Haque  : 1980  MRN: [id number]  Referring provider: Kirsten Martinez PA-C  Dx:   Encounter Diagnosis     ICD-10-CM    1  Lateral epicondylitis of right elbow  M77 11        Start Time: 1345  Stop Time: 1427  Total time in clinic (min): 42 minutes    Subjective: Pt reports that she has pain in her elbow today  Objective: See treatment diary below      Assessment: Pt demonstrated good tolerance to treatment session  She continues to make improvements in elbow and wrist ROM, and was able to reach nearly full motion today  Increased resistance for wrist and elbow exercises today, with minimal increases in symptoms  Pt did have difficulty achieving full elbow flexion AROM due to pain and weakness  Pt would benefit from continued PT  Plan: Continue per plan of care        Precautions: None    Re-eval Date: 2022    Manuals      R elbow and wrist PROM 8' 10' 10' 8' 8' 8' 8' 8'     IASTM lateral elbow                                       Neuro Re-Ed             MTP/LTP  MTP Red 2x10 MTP  /LTP  Red  2x10 MTP/LTP Green 2x10 MTP/LTP  Green  2x10  MTP/LTP  Green  2x10  MTP/LTP  Green  2x10 MTP/LTP  Green  2x10     Triceps extension TB or matrix                          Ther Ex   -        UBE  5' 7' alt 7' alt 8' alt 8' alt 8' alt  8' alt     Wrist flex stretch 3x20" 3x20" 3x20" 3x20" 3x20' 3x20" 3x20" 3x20"     Wrist flex/ext AROM 1x10 2x10 2x10 2x10 2x10 2x10 2x10 1# 2x10     Wrist UD/RD AROM             Wrist supination/pronation AROM 1x10 2x10 2x10 2x10 2x10 2x10 2x10 1# 2x10     Elbow flex/ext AROM 1x10 2x10 2x10 2x10 2x10 2x10 2x10 2# 2x10     Eccentric wrist extension  1# 2x10 1#   2x10 1# 2x10 1#  2x10 1#  2x10 1#  2x10 2# 2x10     Digiflex nv Green 4x5 Green  4x5 Green 4x5 Green  4x5 Green  4x5 Green  4 x5 Green x20     Therabar             Putty: , roll, pinch             Power web Ther Activity                                       Gait Training                                       Modalities             CP

## 2022-07-26 ENCOUNTER — APPOINTMENT (OUTPATIENT)
Dept: PHYSICAL THERAPY | Facility: HOME HEALTHCARE | Age: 42
End: 2022-07-26
Payer: OTHER MISCELLANEOUS

## 2022-08-01 ENCOUNTER — EVALUATION (OUTPATIENT)
Dept: PHYSICAL THERAPY | Facility: HOME HEALTHCARE | Age: 42
End: 2022-08-01
Payer: OTHER MISCELLANEOUS

## 2022-08-01 DIAGNOSIS — M77.11 LATERAL EPICONDYLITIS OF RIGHT ELBOW: Primary | ICD-10-CM

## 2022-08-01 PROCEDURE — 97140 MANUAL THERAPY 1/> REGIONS: CPT

## 2022-08-01 PROCEDURE — 97112 NEUROMUSCULAR REEDUCATION: CPT

## 2022-08-01 PROCEDURE — 97110 THERAPEUTIC EXERCISES: CPT

## 2022-08-01 NOTE — PROGRESS NOTES
PT Re-Evaluation     Today's date: 2022  Patient name: Vandana Cadet  : 1980  MRN: [id number]  Referring provider: Andrew Mccain PA-C  Dx:   Encounter Diagnosis     ICD-10-CM    1  Lateral epicondylitis of right elbow  M77 11        Start Time: 1345  Stop Time: 1430  Total time in clinic (min): 45 minutes    Assessment  Assessment details: Pt has made significant improvements in R elbow and wrist ROM, however, she continues to demonstrate significant pain levels and strength deficits  She has met her short-term goals for independence with HEP, however, she is still progressing toward her goals for ROM, pain, strength, and overall functional mobility  Although her elbow and wrist ROM improved, she actually performed worse today with  strength and manual muscle testing than she did at initial evaluation, possibly due to increased pain levels  Pt was notified to talk to her MD about these findings  Pt would benefit from continued PT in order to further improve in pain, strength, ROM, functional mobility, quality of life, and return to PLOF  Thank you! Impairments: abnormal or restricted ROM, abnormal movement, activity intolerance, impaired physical strength, lacks appropriate home exercise program, pain with function, poor posture  and poor body mechanics  Understanding of Dx/Px/POC: good   Prognosis: good    Goals  STG: 3-4 weeks  Pt will be independent with HEP in order to continue to progress outside of PT treatment sessions  Met  Pt will improve in quality of life as demonstrated by worst pain levels of 5/10 or less  Pt will improve in functional mobility as demonstrated by 120 degrees or greater of elbow flex AROM  Progressing  LT-8 weeks  Pt will improve in quality of life as demonstrated by worst pain levels of 2/10 or less  Pt will improve in functional mobility as demonstrated by strength levels of 4+/5 or greater    Pt will improve in functional mobility as demonstrated by R elbow AROM WFL  Progressing  Pt will improve in functional mobility as demonstrated by R wrist AROM WFL  Progressing      Plan  Patient would benefit from: skilled physical therapy  Planned modality interventions: cryotherapy, low level laser therapy and thermotherapy: hydrocollator packs  Planned therapy interventions: body mechanics training, functional ROM exercises, home exercise program, flexibility, joint mobilization, manual therapy, massage, neuromuscular re-education, patient education, postural training, strengthening, stretching, therapeutic activities and therapeutic exercise  Frequency: 2x week  Duration in weeks: 4  Plan of Care beginning date: 2022  Plan of Care expiration date: 2022  Treatment plan discussed with: patient        Subjective Evaluation    History of Present Illness  Mechanism of injury: Pt is presenting to O[facility]T after hitting her R elbow on a metal bar while at work  Pt is currently employed as a   She is presenting with pain in her lateral elbow and in her triceps on the R side  The injury occurred on 2022  Pt reports that her elbow has not improved at all, but in fact has gotten worse  Pt reports that the pain is all the time, but that it hurts the most when she moves it  It also worsens throughout the day as she moves it  Pt reports that she feels like her whole hand feels weak because of the pain  Pt reports that she has some stiffness in her elbow when she wakes up in the morning, but denies any numbness or tingling  UPDATE 2022:  Pt reports that recently, her elbow has really been bothering her and that she thinks it is getting worse  She reports that she normally has a 3-5/10, but that today she has a 9/10 pain    Pain  Current pain ratin  At best pain rating: 3  At worst pain rating: 10  Quality: sharp and throbbing  Relieving factors: medications  Aggravating factors: lifting (moving the elbow)      Diagnostic Tests  X-ray: normal  Patient Goals  Patient goals for therapy: decreased pain, increased motion, increased strength, independence with ADLs/IADLs and return to work  Patient goal: To get arm back to 100%        Objective     Palpation     Right   Tenderness of the triceps and wrist extensors  Neurological Testing     Sensation     Elbow   Left Elbow   Intact: light touch    Right Elbow   Intact: light touch    Active Range of Motion   Left Shoulder   Flexion: WFL  Abduction: WFL  External rotation BTH: WFL  Internal rotation BTB: WFL    Right Shoulder   Flexion: WFL  Abduction: WFL  External rotation BTH: WFL  Internal rotation BTB: WFL    Left Elbow   Flexion: WFL  Extension: WFL    Right Elbow   Flexion: 105 degrees with pain  Extension: -1 degrees with pain    Left Wrist   Wrist flexion: 56 degrees   Wrist extension: 58 degrees     Right Wrist   Wrist flexion: 54 degrees   Wrist extension: 50 degrees     Strength/Myotome Testing   Cervical Spine     Left   Interossei strength (t1): 5    Right   Interossei strength (t1): 4    Left Elbow   Flexion: 5  Extension: 5    Right Elbow   Flexion: 3+  Extension: 3+    Left Wrist/Hand   Wrist extension: 5  Wrist flexion: 5  Radial deviation: 5  Ulnar deviation: 5     (2nd hand position)     Trial 1: 55    Trial 2: 45    Trial 3: 35    Right Wrist/Hand   Wrist extension: 3  Wrist flexion: 3  Radial deviation: 4-  Ulnar deviation: 3+     (2nd hand position)     Trial 1: 10    Trial 2: 12 5    Trial 3: 10    Tests     Right Elbow   Positive Cozen's                Precautions: None    Re-eval Date: 7/29/2022      Manuals 6/29 7/1 7-6 7/8 7-14 7-18 7/22 7/25 8/1    R elbow and wrist PROM 8' 10' 10' 8' 8' 8' 8' 8' 8'    IASTM lateral elbow                                       Neuro Re-Ed     7-14        MTP/LTP  MTP Red 2x10 MTP  /LTP  Red  2x10 MTP/LTP Green 2x10 MTP/LTP  Green  2x10  MTP/LTP  Green  2x10  MTP/LTP  Green  2x10 MTP/LTP  Green  2x10 MTP/LTP  Green  2x10    Triceps extension TB or matrix                          Ther Ex   7-6  7-14        UBE  5' 7' alt 7' alt 8' alt 8' alt 8' alt  8' alt nv    Wrist flex stretch 3x20" 3x20" 3x20" 3x20" 3x20' 3x20" 3x20" 3x20" 3x20"    Wrist flex/ext AROM 1x10 2x10 2x10 2x10 2x10 2x10 2x10 1# 2x10 1# 2x10    Wrist UD/RD AROM             Wrist supination/pronation AROM 1x10 2x10 2x10 2x10 2x10 2x10 2x10 1# 2x10 1# 2x10    Elbow flex/ext AROM 1x10 2x10 2x10 2x10 2x10 2x10 2x10 2# 2x10 2# 2x10    Eccentric wrist extension  1# 2x10 1#   2x10 1# 2x10 1#  2x10 1#  2x10 1#  2x10 2# 2x10 2# 2x10    Digiflex nv Green 4x5 Green  4x5 Green 4x5 Green  4x5 Green  4x5 Green  4 x5 Green x20 Green x20    Therabar             Putty: , roll, pinch             Power web             Ther Activity                                       Gait Training                                       Modalities             CP

## 2022-08-02 ENCOUNTER — OFFICE VISIT (OUTPATIENT)
Dept: PHYSICAL THERAPY | Facility: HOME HEALTHCARE | Age: 42
End: 2022-08-02
Payer: OTHER MISCELLANEOUS

## 2022-08-02 DIAGNOSIS — M77.11 LATERAL EPICONDYLITIS OF RIGHT ELBOW: Primary | ICD-10-CM

## 2022-08-02 PROCEDURE — 97140 MANUAL THERAPY 1/> REGIONS: CPT

## 2022-08-02 PROCEDURE — 97110 THERAPEUTIC EXERCISES: CPT

## 2022-08-02 PROCEDURE — 97112 NEUROMUSCULAR REEDUCATION: CPT

## 2022-08-02 NOTE — PROGRESS NOTES
Daily Note     Today's date: 2022  Patient name: Barbie Sheikh  : 1980  MRN: [id number]  Referring provider: Berny Galindo PA-C  Dx:   Encounter Diagnosis     ICD-10-CM    1  Lateral epicondylitis of right elbow  M77 11               Subjective: Pt reports she is better than yesterday and has slight pain in her R elbow  Objective: See treatment diary below      Assessment: Tolerated treatment well  No increased pain reported R elbow t/o session or at end of session  ROM improving R elbow and wrist   Patient would benefit from continued PT      Plan: Continue per plan of care        Precautions: None    Re-eval Date: 2022      Manuals  7-   R elbow and wrist PROM 8' 10' 10' 8' 8' 8' 8' 8' 8' 8'   IASTM lateral elbow                                       Neuro Re-Ed             MTP/LTP  MTP Red 2x10 MTP  /LTP  Red  2x10 MTP/LTP Green 2x10 MTP/LTP  Green  2x10  MTP/LTP  Green  2x10  MTP/LTP  Green  2x10 MTP/LTP  Green  2x10 MTP/LTP  Green  2x10 MTP/LTP  Green  2x10   Triceps extension TB or matrix                          Ther Ex   --        UBE  5' 7' alt 7' alt 8' alt 8' alt 8' alt  8' alt nv 8'alt   Wrist flex stretch 3x20" 3x20" 3x20" 3x20" 3x20' 3x20" 3x20" 3x20" 3x20" 3 x20"    Wrist flex/ext AROM 1x10 2x10 2x10 2x10 2x10 2x10 2x10 1# 2x10 1# 2x10 1# 2x10   Wrist UD/RD AROM             Wrist supination/pronation AROM 1x10 2x10 2x10 2x10 2x10 2x10 2x10 1# 2x10 1# 2x10 1# 2X10   Elbow flex/ext AROM 1x10 2x10 2x10 2x10 2x10 2x10 2x10 2# 2x10 2# 2x10 2# 2x10   Eccentric wrist extension  1# 2x10 1#   2x10 1# 2x10 1#  2x10 1#  2x10 1#  2x10 2# 2x10 2# 2x10 2# 2X10    Digiflex nv Green 4x5 Green  4x5 Green 4x5 Green  4x5 Green  4x5 Green  4 x5 Green x20 Green x20 Green  x20   Therabar             Putty: , roll, pinch             Power web             Ther Activity                                       Gait Training Modalities             CP

## 2022-08-04 ENCOUNTER — OFFICE VISIT (OUTPATIENT)
Dept: OBGYN CLINIC | Facility: CLINIC | Age: 42
End: 2022-08-04
Payer: OTHER MISCELLANEOUS

## 2022-08-04 VITALS
SYSTOLIC BLOOD PRESSURE: 100 MMHG | BODY MASS INDEX: 34.49 KG/M2 | HEIGHT: 64 IN | HEART RATE: 84 BPM | DIASTOLIC BLOOD PRESSURE: 62 MMHG | WEIGHT: 202 LBS

## 2022-08-04 DIAGNOSIS — M77.11 LATERAL EPICONDYLITIS OF RIGHT ELBOW: Primary | ICD-10-CM

## 2022-08-04 PROCEDURE — 20551 NJX 1 TENDON ORIGIN/INSJ: CPT | Performed by: ORTHOPAEDIC SURGERY

## 2022-08-04 PROCEDURE — 99214 OFFICE O/P EST MOD 30 MIN: CPT | Performed by: ORTHOPAEDIC SURGERY

## 2022-08-04 RX ORDER — DEXAMETHASONE SODIUM PHOSPHATE 100 MG/10ML
40 INJECTION INTRAMUSCULAR; INTRAVENOUS
Status: COMPLETED | OUTPATIENT
Start: 2022-08-04 | End: 2022-08-04

## 2022-08-04 RX ORDER — LIDOCAINE HYDROCHLORIDE 10 MG/ML
3 INJECTION, SOLUTION INFILTRATION; PERINEURAL
Status: COMPLETED | OUTPATIENT
Start: 2022-08-04 | End: 2022-08-04

## 2022-08-04 RX ADMIN — LIDOCAINE HYDROCHLORIDE 3 ML: 10 INJECTION, SOLUTION INFILTRATION; PERINEURAL at 16:56

## 2022-08-04 RX ADMIN — DEXAMETHASONE SODIUM PHOSPHATE 40 MG: 100 INJECTION INTRAMUSCULAR; INTRAVENOUS at 16:56

## 2022-08-04 NOTE — PROGRESS NOTES
[address] [address] [place] [id number]  [facility]  [id number]  1980    ORTHOPAEDIC SURGERY OUTPATIENT NOTE  2022      HISTORY:  35 y o  female who presents the office today for a follow-up evaluation of her right elbow lateral epicondylitis  This is a workman's compensation case  She states that she has been compliant with attending formal physical therapy but notes no improvement  She states that she feels that her pain has been getting worse  She states that she will experience a daily constant sharp pain located over the medial and lateral aspects of her right elbow  She also notes stiffness about her elbow  She states that she will use oxycodone to help alleviate her pain  She states that she returned to work on 2022 at light duty work status  She states she has been logging [employer]      Past Medical History:   Diagnosis Date    Asthma     Diabetes mellitus ([facility] [address] )        Past Surgical History:   Procedure Laterality Date    ADENOIDECTOMY       SECTION      COSMETIC SURGERY      stomach- lipo and fat transfer    FOOT FRACTURE SURGERY      TYMPANOSTOMY TUBE PLACEMENT      fell out       Social History     Socioeconomic History    Marital status: Single     Spouse name: Not on file    Number of children: Not on file    Years of education: Not on file    Highest education level: GED or equivalent   Occupational History     Comment:    Tobacco Use    Smoking status: Current Every Day Smoker     Packs/day: 1 00     Years: 15 00     Pack years: 15 00    Smokeless tobacco: Never Used   Vaping Use    Vaping Use: Never used   Substance and Sexual Activity    Alcohol use: Not Currently    Drug use: Never    Sexual activity: Yes     Partners: Male   Other Topics Concern    Not on file   Social History Narrative    Lives in house with  and 2 kids Social Determinants of Health     Financial Resource Strain: Not on file   Food Insecurity: Not on file   Transportation Needs: Not on file   Physical Activity: Not on file   Stress: Not on file   Social Connections: Not on file   Intimate Partner Violence: Not on file   Housing Stability: Not on file       Family History   Problem Relation Age of Onset    Hypertension Mother     Diabetes Mother     Diabetes Father     No Known Problems Half-Brother     No Known Problems Half-Brother         Patient's Medications   New Prescriptions    No medications on file   Previous Medications    DICLOFENAC SODIUM (VOLTAREN) 1 %    Apply 2 g topically 4 (four) times a day    HALOBETASOL (ULTRAVATE) 0 05 % CREAM    Apply topically 2 (two) times a day    SITAGLIPTIN (JANUVIA) 100 MG TABLET    Take 100 mg by mouth daily   Modified Medications    No medications on file   Discontinued Medications    No medications on file       No Known Allergies     There were no vitals taken for this visit  REVIEW OF SYSTEMS:  Constitutional: Negative  HEENT: Negative  Respiratory: Negative  Skin: Negative  Neurological: Negative  Psychiatric/Behavioral: Negative  Musculoskeletal: Negative except for that mentioned in the HPI  PHYSICAL EXAM:      There were no vitals taken for this visit  Gen: Alert and oriented to person, place, time  HEENT: EOMI, eyes clear, moist mucus membranes, hearing intact  Respiratory: Bilateral chest rise   No audible wheezing found  Cardiovascular: Regular Rate and Rhythm  Abdomen: soft nontender/nondistended    R elbow:  Flexion: 140 degrees  Extension: 0 degrees  Pronation: 80 degrees  Supination: 80 degrees    TTP Lateral Epicondyle:  Positive  TTP Medial Epicondyle: negative  TTP Olecranon: negative  TTP Radial Head: negative  TTP Biceps Tendon: negative    Strength:  Flexion: 5/5  Extension: 5/5  Pronation: 5/5  Supination: 5/5    Pain with resisted wrist extension:  Positive  Pain with resisted 3rd finger extension: negative  Pain with resisted wrist flexion: negative    Varus laxity: negative  Valgus laxity: negative  Milking maneuver: negative  Moving valgus stress test: negative    Cubital tunnel Tinel's: negative    Radial/median/ulnar nerve intact    <2 sec cap refill    IMAGING:  No new images reviewed today  ASSESSMENT AND PLAN:  35 y o  female right elbow lateral epicondylitis    This is a workman's compensation case  Formal physical therapy notes were reviewed with the patient at today's visit  Unfortunately, she has not experienced any improvements with her symptoms  We discussed the risks and benefits of corticosteroid injection today in the office and she did elect to proceed  The right elbow lateral epicondyle corticosteroid injection was administered without issue and well tolerated by the patient  This was done under sterile technique  Post injection instructions were provided  She understands can be repeated no sooner than three months  She was instructed to continue her home exercise program focusing on stretching  She was instructed to continue with formal physical therapy starting next week  At this time, she will remain on light duty  A work note was provided to the patient at today's visit  I will follow-up with her in 4 weeks for a clinical re-evaluation  She understood and had no further questions  Hand/upper extremity injection: R elbow  Universal Protocol:  Consent: Verbal consent obtained  Risks and benefits: risks, benefits and alternatives were discussed  Consent given by: patient  Time out: Immediately prior to procedure a "time out" was called to verify the correct patient, procedure, equipment, support staff and site/side marked as required    Timeout called at: 8/4/2022 4:52 PM   Site marked: the operative site was marked  Patient identity confirmed: verbally with patient    Supporting Documentation  Indications: pain   Procedure Details  Condition:lateral epicondylitis Site: R elbow   Preparation: Patient was prepped and draped in the usual sterile fashion  Needle size: 25 G  Ultrasound guidance: no  Medications administered: 40 mg dexamethasone 100 mg/10 mL; 3 mL lidocaine 1 %    Patient tolerance: patient tolerated the procedure well with no immediate complications  Dressing:  Sterile dressing applied             Scribe Attestation    I,:  Bayfield Dade am acting as a scribe while in the presence of the attending physician :       I,:  Cheri Guy personally performed the services described in this documentation    as scribed in my presence :

## 2022-08-04 NOTE — LETTER
August 4, 2022     Patient: Barbie Sheikh  YOB: 1980  Date of Visit: 8/4/2022      To Whom it May Concern:    Barbieshahla Sheikh is under my professional care  Yaw Flores was seen in my office on 8/4/2022  Yaw Flores will continue working at [employer] duty duty work status  She will follow-up with me in 4 weeks  If you have any questions or concerns, please don't hesitate to call           Sincerely,          Makeda Burgos        CC: Barbie Sheikh

## 2022-08-08 ENCOUNTER — APPOINTMENT (OUTPATIENT)
Dept: PHYSICAL THERAPY | Facility: HOME HEALTHCARE | Age: 42
End: 2022-08-08
Payer: OTHER MISCELLANEOUS

## 2022-08-09 ENCOUNTER — APPOINTMENT (OUTPATIENT)
Dept: PHYSICAL THERAPY | Facility: HOME HEALTHCARE | Age: 42
End: 2022-08-09
Payer: OTHER MISCELLANEOUS

## 2022-08-10 ENCOUNTER — OFFICE VISIT (OUTPATIENT)
Dept: PHYSICAL THERAPY | Facility: HOME HEALTHCARE | Age: 42
End: 2022-08-10
Payer: OTHER MISCELLANEOUS

## 2022-08-10 DIAGNOSIS — M77.11 LATERAL EPICONDYLITIS OF RIGHT ELBOW: Primary | ICD-10-CM

## 2022-08-10 PROCEDURE — 97110 THERAPEUTIC EXERCISES: CPT

## 2022-08-10 PROCEDURE — 97112 NEUROMUSCULAR REEDUCATION: CPT

## 2022-08-10 PROCEDURE — 97140 MANUAL THERAPY 1/> REGIONS: CPT

## 2022-08-10 NOTE — PROGRESS NOTES
Daily Note     Today's date: 8/10/2022  Patient name: Danielle Carl  : 1980  MRN: [id number]  Referring provider: Jonelle Mcdonald PA-C  Dx: No diagnosis found  Start Time: 0          Subjective: I had an injection last Friday and it didn't help at all  I took medication this morning so pain is about 4/10 right now  Objective: See treatment diary below    Assessment: Tolerated treatment well  Pt able to complete TE without c/o increase in pain level  She did report tenderness and pulling sensation at lateral epicondyle region with occasional radiation into forearm during TE  Pain was evident with all planes of elbow, forearm and wrist PROM  Pt declined CP to home  Patient would benefit from continued PT    Plan: Continue per plan of care        Precautions: None    Re-eval Date: 2022      Manuals 8-10 -   R elbow and wrist PROM 8' 10' 10' 8' 8' 8' 8' 8' 8' 8'   IASTM lateral elbow                                       Neuro Re-Ed 8-10    7-14        MTP/LTP MTP/LTP  Green  2x10 MTP Red 2x10 MTP  /LTP  Red  2x10 MTP/LTP Green 2x10 MTP/LTP  Green  2x10  MTP/LTP  Green  2x10  MTP/LTP  Green  2x10 MTP/LTP  Green  2x10 MTP/LTP  Green  2x10 MTP/LTP  Green  2x10   Triceps extension TB or matrix                          Ther Ex 8-10  7--        UBE 10' w/rests 5' 7' alt 7' alt 8' alt 8' alt 8' alt  8' alt nv 8'alt   Wrist flex stretch 3x20" 3x20" 3x20" 3x20" 3x20' 3x20" 3x20" 3x20" 3x20" 3 x20"    Wrist flex/ext AROM 1# 2x10 2x10 2x10 2x10 2x10 2x10 2x10 1# 2x10 1# 2x10 1# 2x10   Wrist UD/RD AROM             Wrist supination/pronation AROM 1# 2x10 2x10 2x10 2x10 2x10 2x10 2x10 1# 2x10 1# 2x10 1# 2X10   Elbow flex/ext AROM 2# 2x10 2x10 2x10 2x10 2x10 2x10 2x10 2# 2x10 2# 2x10 2# 2x10   Eccentric wrist extension 2# 2x10 1# 2x10 1#   2x10 1# 2x10 1#  2x10 1#  2x10 1#  2x10 2# 2x10 2# 2x10 2# 2X10    Digiflex Green  x20 Green 4x5 Green  4x5 Green 4x5 Green  4x5 Green  4x5 Green  4 x5 Green x20 Green x20 Green  x20   Therabar             Putty: , roll, pinch             Power web             Ther Activity                                       Gait Training                                       Modalities             CP home

## 2022-08-11 ENCOUNTER — TELEPHONE (OUTPATIENT)
Dept: OBGYN CLINIC | Facility: HOSPITAL | Age: 42
End: 2022-08-11

## 2022-08-11 NOTE — TELEPHONE ENCOUNTER
Patient is asking if Dr Amalia Bonilla can give her a call as soon as possible       Patient's cb # [phone]

## 2022-08-11 NOTE — TELEPHONE ENCOUNTER
Work note put in  Are we able to email it to [email or web address] ? If not, she said she can have someone pick it up and then email it to her

## 2022-08-12 ENCOUNTER — TELEPHONE (OUTPATIENT)
Dept: OBGYN CLINIC | Facility: HOSPITAL | Age: 42
End: 2022-08-12

## 2022-08-12 NOTE — TELEPHONE ENCOUNTER
Patient calling back with fax number [phone]  She is asking if it her work note can be faxed ASAP because she is by the fax machine

## 2022-08-15 ENCOUNTER — APPOINTMENT (OUTPATIENT)
Dept: PHYSICAL THERAPY | Facility: HOME HEALTHCARE | Age: 42
End: 2022-08-15
Payer: OTHER MISCELLANEOUS

## 2022-08-16 ENCOUNTER — OFFICE VISIT (OUTPATIENT)
Dept: PHYSICAL THERAPY | Facility: HOME HEALTHCARE | Age: 42
End: 2022-08-16
Payer: OTHER MISCELLANEOUS

## 2022-08-16 DIAGNOSIS — M77.11 LATERAL EPICONDYLITIS OF RIGHT ELBOW: Primary | ICD-10-CM

## 2022-08-16 PROCEDURE — 97112 NEUROMUSCULAR REEDUCATION: CPT

## 2022-08-16 PROCEDURE — 97110 THERAPEUTIC EXERCISES: CPT

## 2022-08-16 PROCEDURE — 97140 MANUAL THERAPY 1/> REGIONS: CPT

## 2022-08-16 NOTE — PROGRESS NOTES
Daily Note     Today's date: 2022  Patient name: Clemencia Mcdaniels  : 1980  MRN: [id number]  Referring provider: Reji Rainey PA-C  Dx: No diagnosis found  Start Time: 830          Subjective: I went back to work at regular duty on  so my elbow is pretty sore  Objective: See treatment diary below    Assessment: Tolerated treatment well  No advancements with todays session as pt reports being challenged with current program  Pt reports pain with most ex but able to jerome without the need to reduce  Pt did report less tightness after MT tx  Patient would benefit from continued PT    Plan: Continue per plan of care   Add putty ex NV     Precautions: None    Re-eval Date: 2022      Manuals 8-10 8-16 7-6 7/8 7-14 7-18 7/22 7/25 8/1 8/2   R elbow and wrist PROM 8' 10' 10' 8' 8' 8' 8' 8' 8' 8'   IASTM lateral elbow                                       Neuro Re-Ed 8-10 8-16   7-14        MTP/LTP MTP/LTP  Green  2x10 MTP/LTP  Green   2x10 MTP  /LTP  Red  2x10 MTP/LTP Green 2x10 MTP/LTP  Green  2x10  MTP/LTP  Green  2x10  MTP/LTP  Green  2x10 MTP/LTP  Green  2x10 MTP/LTP  Green  2x10 MTP/LTP  Green  2x10   Triceps extension TB or matrix                          Ther Ex 8-10  7-6  7-14        UBE 10' w/rests 8' w/rests 7' alt 7' alt 8' alt 8' alt 8' alt  8' alt nv 8'alt   Wrist flex stretch 3x20" 3x20" 3x20" 3x20" 3x20' 3x20" 3x20" 3x20" 3x20" 3 x20"    Wrist flex/ext AROM 1# 2x10 1# 2x10 2x10 2x10 2x10 2x10 2x10 1# 2x10 1# 2x10 1# 2x10   Wrist UD/RD AROM             Wrist supination/pronation AROM 1# 2x10 1# 2x10 2x10 2x10 2x10 2x10 2x10 1# 2x10 1# 2x10 1# 2X10   Elbow flex/ext AROM 2# 2x10 2# 2x10 2x10 2x10 2x10 2x10 2x10 2# 2x10 2# 2x10 2# 2x10   Eccentric wrist extension 2# 2x10 2# 2x10 1#   2x10 1# 2x10 1#  2x10 1#  2x10 1#  2x10 2# 2x10 2# 2x10 2# 2X10    Digiflex Green  x20 Green x20 Green  4x5 Green 4x5 Green  4x5 Green  4x5 Green  4 x5 Green x20 Green x20 Laurita Perkin  x20   [place] Putty: , roll, pinch  NV           Power web             Ther Activity                                       Gait Training                                       Modalities             CP home home

## 2022-08-19 ENCOUNTER — TELEPHONE (OUTPATIENT)
Dept: OBGYN CLINIC | Facility: CLINIC | Age: 42
End: 2022-08-19

## 2022-08-22 ENCOUNTER — OFFICE VISIT (OUTPATIENT)
Dept: PHYSICAL THERAPY | Facility: HOME HEALTHCARE | Age: 42
End: 2022-08-22
Payer: OTHER MISCELLANEOUS

## 2022-08-22 DIAGNOSIS — M77.11 LATERAL EPICONDYLITIS OF RIGHT ELBOW: Primary | ICD-10-CM

## 2022-08-22 PROCEDURE — 97112 NEUROMUSCULAR REEDUCATION: CPT

## 2022-08-22 PROCEDURE — 97110 THERAPEUTIC EXERCISES: CPT

## 2022-08-22 PROCEDURE — 97140 MANUAL THERAPY 1/> REGIONS: CPT

## 2022-08-22 NOTE — PROGRESS NOTES
Daily Note     Today's date: 2022  Patient name: Kari Leyden  : 1980  MRN: [id number]  Referring provider: Yvette Segal PA-C  Dx:   Encounter Diagnosis     ICD-10-CM    1  Lateral epicondylitis of right elbow  M77 11        Start Time: 1300  Stop Time: 1350  Total time in clinic (min): 50 minutes    Subjective: Pt reports that she is now back to full duty at work, but that she thinks she went back too soon because she has been having a lot of pain  Pt reports that she received an injection, but that it only helped temporarily  Objective: See treatment diary below      Assessment: Pt demonstrated good tolerance to treatments session and was able to progress with strengthening exercises  Pt does continue to show signs of pain and fatigue when performing her exercises, but she was able to perform them with good form  Pt would benefit from continued PT  Plan: Continue per plan of care        Precautions: None    Re-eval Date: 2022      Manuals 8-10 8-16 8/22  7-14 7-18 7/22 7/25 8/1 8/2   R elbow and wrist PROM 8' 10' 8'  8' 8' 8' 8' 8' 8'   IASTM lateral elbow                                       Neuro Re-Ed 8-10 8-16   7-14        MTP/LTP MTP/LTP  Green  2x10 MTP/LTP  Green   2x10 MTP/LTP  Green   2x10  MTP/LTP  Green  2x10  MTP/LTP  Green  2x10  MTP/LTP  Green  2x10 MTP/LTP  Green  2x10 MTP/LTP  Green  2x10 MTP/LTP  Green  2x10   Triceps extension TB or matrix                          Ther Ex 8-10    7-14        UBE 10' w/rests 8' w/rests 8' w/ rests  8' alt 8' alt 8' alt  8' alt nv 8'alt   Wrist flex stretch 3x20" 3x20" 3x20"  3x20' 3x20" 3x20" 3x20" 3x20" 3 x20"    Wrist flex/ext AROM 1# 2x10 1# 2x10 1# 2x10  2x10 2x10 2x10 1# 2x10 1# 2x10 1# 2x10   Wrist UD/RD AROM             Wrist supination/pronation AROM 1# 2x10 1# 2x10 2# 2x10  2x10 2x10 2x10 1# 2x10 1# 2x10 1# 2X10   Elbow flex/ext AROM 2# 2x10 2# 2x10 2# 2x10  2x10 2x10 2x10 2# 2x10 2# 2x10 2# 2x10   Eccentric wrist extension 2# 2x10 2# 2x10 2# 2x10  1#  2x10 1#  2x10 1#  2x10 2# 2x10 2# 2x10 2# 2X10    Digiflex Green  x20 Green x20 Green x20  Green  4x5 Green  4x5 Green  4 x5 Green x20 Green x20 Green  x20   Therabar             Putty: , roll, pinch  NV Yellow 1' ea          Power web             Ther Activity                                       Gait Training                                       Modalities             CP home home

## 2022-08-23 ENCOUNTER — APPOINTMENT (OUTPATIENT)
Dept: PHYSICAL THERAPY | Facility: HOME HEALTHCARE | Age: 42
End: 2022-08-23
Payer: OTHER MISCELLANEOUS

## 2022-08-24 ENCOUNTER — OFFICE VISIT (OUTPATIENT)
Dept: PHYSICAL THERAPY | Facility: HOME HEALTHCARE | Age: 42
End: 2022-08-24
Payer: OTHER MISCELLANEOUS

## 2022-08-24 DIAGNOSIS — M77.11 LATERAL EPICONDYLITIS OF RIGHT ELBOW: Primary | ICD-10-CM

## 2022-08-24 PROCEDURE — 97140 MANUAL THERAPY 1/> REGIONS: CPT

## 2022-08-24 PROCEDURE — 97110 THERAPEUTIC EXERCISES: CPT

## 2022-08-24 PROCEDURE — 97112 NEUROMUSCULAR REEDUCATION: CPT

## 2022-08-24 NOTE — PROGRESS NOTES
Daily Note     Today's date: 2022  Patient name: Lisa Luna  : 1980  MRN: [id number]  Referring provider: Woodrow Diana PA-C  Dx: No diagnosis found  Start Time: 740          Subjective: No improvements noticed  I think I went back to work at full duty to soon  The movement when I lift a water bottle and bring it to my mouth is extremely painful  Objective: See treatment diary below    Assessment: Tolerated treatment well  Pt with pain evident during most TE and rests needed t/o session  Pt reports most pain with elbow flexion and pronation/supination movements  Pt was able to jerome added t-band triceps extension today    Patient would benefit from continued PT    Plan: Continue per plan of care        Precautions: None    Re-eval Date: 2022      Manuals 8-10 8-16 8/22 8-24 7-14 7-18 7/22 7/25 8/1 8/2   R elbow and wrist PROM 8' 10' 8' 8' 8' 8' 8' 8' 8' 8'   IASTM lateral elbow                                       Neuro Re-Ed 8-10 8-16  8-24 7-14        MTP/LTP MTP/LTP  Green  2x10 MTP/LTP  Green   2x10 MTP/LTP  Green   2x10 Green 2x10 ea MTP/LTP  Green  2x10  MTP/LTP  Green  2x10  MTP/LTP  Green  2x10 MTP/LTP  Green  2x10 MTP/LTP  Green  2x10 MTP/LTP  Green  2x10   Triceps extension TB or matrix    Red 2x5                      Ther Ex 8-10   8-24 7-14        UBE 10' w/rests 8' w/rests 8' w/ rests 8' w/rests 8' alt 8' alt 8' alt  8' alt nv 8'alt   Wrist flex stretch 3x20" 3x20" 3x20" 3x20 3x20' 3x20" 3x20" 3x20" 3x20" 3 x20"    Wrist flex/ext AROM 1# 2x10 1# 2x10 1# 2x10 1#  2x10 2x10 2x10 2x10 1# 2x10 1# 2x10 1# 2x10   Wrist UD/RD AROM             Wrist supination/pronation AROM 1# 2x10 1# 2x10 2# 2x10 2#  2x10 2x10 2x10 2x10 1# 2x10 1# 2x10 1# 2X10   Elbow flex/ext AROM 2# 2x10 2# 2x10 2# 2x10 2#  1x12 2x10 2x10 2x10 2# 2x10 2# 2x10 2# 2x10   Eccentric wrist extension 2# 2x10 2# 2x10 2# 2x10 2#  2x10 1#  2x10 1#  2x10 1#  2x10 2# 2x10 2# 2x10 2# 2X10    Digiflex Green  x20 Green x20 Green x20 Green  x20 Green  4x5 Green  4x5 Green  4 x5 Green x20 Green x20 Green  x20   Therabar             Putty: , roll, pinch  NV Yellow 1' ea Yellow  1' ea         Power web             Ther Activity                                       Gait Training                                       Modalities             CP home home  Home

## 2022-08-30 ENCOUNTER — APPOINTMENT (OUTPATIENT)
Dept: PHYSICAL THERAPY | Facility: HOME HEALTHCARE | Age: 42
End: 2022-08-30
Payer: OTHER MISCELLANEOUS

## 2022-08-31 ENCOUNTER — OFFICE VISIT (OUTPATIENT)
Dept: PHYSICAL THERAPY | Facility: HOME HEALTHCARE | Age: 42
End: 2022-08-31
Payer: OTHER MISCELLANEOUS

## 2022-08-31 DIAGNOSIS — M77.11 LATERAL EPICONDYLITIS OF RIGHT ELBOW: Primary | ICD-10-CM

## 2022-08-31 PROCEDURE — 97140 MANUAL THERAPY 1/> REGIONS: CPT

## 2022-08-31 PROCEDURE — 97112 NEUROMUSCULAR REEDUCATION: CPT

## 2022-08-31 PROCEDURE — 97110 THERAPEUTIC EXERCISES: CPT

## 2022-08-31 NOTE — PROGRESS NOTES
Daily Note     Today's date: 2022  Patient name: Bree Collins  : 1980  MRN: [id number]  Referring provider: Panda Ortega PA-C  Dx: No diagnosis found  Start Time: 1000          Subjective:  I was off work on Monday and Tuesday and I only worked 1/2 a day yesterday and I didn't have to drive so I am doing pretty good today  Pain of 2/10  I can't do a lot of things because of weakness more than pain  Objective: See treatment diary below    Assessment: Tolerated treatment well  Pt has shown significant improvement in pain free ROM but does remain with deficits and pain at end range of various movements  Pt with weakness evident with most activities  Modified supination/pronation ex to use of cane with 1# wt vc 2# DB with improved jerome and report of reduced pain and improved control  Patient would benefit from continued PT    Plan: Continue per plan of care        Precautions: None    Re-eval Date: 2022      Manuals 8-10 8-16 8/22 8-24 8-31 7-18 7/22 7/25 8/1 8/2   R elbow and wrist PROM 8' 10' 8' 8' 8' 8' 8' 8' 8' 8'   IASTM lateral elbow                                       Neuro Re-Ed 8-10 8-16  8-24 8-31        MTP/LTP MTP/LTP  Green  2x10 MTP/LTP  Green   2x10 MTP/LTP  Green   2x10 Green 2x10 ea Green  2x10  MTP/LTP  Green  2x10  MTP/LTP  Green  2x10 MTP/LTP  Green  2x10 MTP/LTP  Green  2x10 MTP/LTP  Green  2x10   Triceps extension TB or matrix    Red 2x5 Red 2x5                     Ther Ex 8-10   8-24 8-31        UBE 10' w/rests 8' w/rests 8' w/ rests 8' w/rests 8' alt  No rests 8' alt 8' alt  8' alt nv 8'alt   Wrist flex stretch 3x20" 3x20" 3x20" 3x20 3x20' 3x20" 3x20" 3x20" 3x20" 3 x20"    Wrist flex/ext AROM 1# 2x10 1# 2x10 1# 2x10 1#  2x10 2x10 2x10 2x10 1# 2x10 1# 2x10 1# 2x10   Wrist UD/RD AROM             Wrist supination/pronation AROM 1# 2x10 1# 2x10 2# 2x10 2#  2x10   2# 2x10  1# on cane x10 2x10 2x10 1# 2x10 1# 2x10 1# 2X10   Elbow flex/ext AROM 2# 2x10 2# 2x10 2# 2x10 2#  1x12 2# 2x10 2x10 2x10 2# 2x10 2# 2x10 2# 2x10   Eccentric wrist extension 2# 2x10 2# 2x10 2# 2x10 2#  2x10 2#  2x10 1#  2x10 1#  2x10 2# 2x10 2# 2x10 2# 2X10    Digiflex Green  x20 Green x20 Green x20 Green  x20 Green  4x5 Green  4x5 Green  4 x5 Green x20 Green x20 Green  x20   Therabar             Putty: , roll, pinch  NV Yellow 1' ea Yellow  1' ea Yellow  1' ea        Power web             Ther Activity                                       Gait Training                                       Modalities             CP home home  Home

## 2022-09-01 ENCOUNTER — EVALUATION (OUTPATIENT)
Dept: PHYSICAL THERAPY | Facility: HOME HEALTHCARE | Age: 42
End: 2022-09-01
Payer: OTHER MISCELLANEOUS

## 2022-09-01 DIAGNOSIS — M77.11 LATERAL EPICONDYLITIS OF RIGHT ELBOW: Primary | ICD-10-CM

## 2022-09-01 PROCEDURE — 97112 NEUROMUSCULAR REEDUCATION: CPT

## 2022-09-01 PROCEDURE — 97110 THERAPEUTIC EXERCISES: CPT

## 2022-09-01 PROCEDURE — 97140 MANUAL THERAPY 1/> REGIONS: CPT

## 2022-09-01 NOTE — PROGRESS NOTES
PT Re-Evaluation     Today's date: 2022  Patient name: Klever Peck  : 1980  MRN: [id number]  Referring provider: Kwaku Casillas PA-C  Dx:   Encounter Diagnosis     ICD-10-CM    1  Lateral epicondylitis of right elbow  M77 11        Start Time: 1345  Stop Time: 1430  Total time in clinic (min): 45 minutes    Assessment  Assessment details: Pt has made some improvements in R elbow and wrist ROM since beginning PT, however, she continues to demonstrate significant pain levels, ROM deficits, and strength deficits  The pt was experiencing some symptom improvement after the injection from her MD, however, she has expressed that she feels like she went back to work too soon, which further exacerbated her symptoms  She has met her short-term goals for independence with HEP, however, she has yet to meet her goals for pain, strength, or ROM  Although she has seen some ROM improvements since start of POC, her elbow flex/ext ROM has regressed since previous evaluation and she has made very little, if any, improvement in wrist, elbow, and  strength  The pt was notified to talk to her MD about these findings  Impairments: abnormal or restricted ROM, abnormal movement, activity intolerance, impaired physical strength, lacks appropriate home exercise program, pain with function, poor posture  and poor body mechanics  Understanding of Dx/Px/POC: good   Prognosis: good    Goals  STG: 3-4 weeks  Pt will be independent with HEP in order to continue to progress outside of PT treatment sessions  Met  Pt will improve in quality of life as demonstrated by worst pain levels of 5/10 or less  Pt will improve in functional mobility as demonstrated by 120 degrees or greater of elbow flex AROM  Progressing  LT-8 weeks  Pt will improve in quality of life as demonstrated by worst pain levels of 2/10 or less  Pt will improve in functional mobility as demonstrated by strength levels of 4+/5 or greater    Pt will improve in functional mobility as demonstrated by R elbow AROM WFL  Pt will improve in functional mobility as demonstrated by R wrist AROM WFL  Plan  Patient would benefit from: skilled physical therapy  Planned modality interventions: cryotherapy, low level laser therapy and thermotherapy: hydrocollator packs  Planned therapy interventions: body mechanics training, functional ROM exercises, home exercise program, flexibility, joint mobilization, manual therapy, massage, neuromuscular re-education, patient education, postural training, strengthening, stretching, therapeutic activities and therapeutic exercise  Frequency: 2x week  Duration in weeks: 4  Plan of Care beginning date: 9/1/2022  Plan of Care expiration date: 10/2/2022  Treatment plan discussed with: patient        Subjective Evaluation    History of Present Illness  Mechanism of injury: Pt is presenting to O[facility] after hitting her R elbow on a metal bar while at work  Pt is currently employed as a   She is presenting with pain in her lateral elbow and in her triceps on the R side  The injury occurred on 06/01/2022  Pt reports that her elbow has not improved at all, but in fact has gotten worse  Pt reports that the pain is all the time, but that it hurts the most when she moves it  It also worsens throughout the day as she moves it  Pt reports that she feels like her whole hand feels weak because of the pain  Pt reports that she has some stiffness in her elbow when she wakes up in the morning, but denies any numbness or tingling  UPDATE 8/1/2022:  Pt reports that recently, her elbow has really been bothering her and that she thinks it is getting worse  She reports that she normally has a 3-5/10, but that today she has a 9/10 pain  UPDATE 9/1/2022:  Pt reports that she was feeling good and has had very little pain since she stopped working  She does report being in some pain again now after her previous PT session    Pain  Current pain ratin  At best pain ratin  At worst pain rating: 10  Quality: sharp and throbbing  Relieving factors: medications  Aggravating factors: lifting (moving the elbow)      Diagnostic Tests  X-ray: normal  Patient Goals  Patient goals for therapy: decreased pain, increased motion, increased strength, independence with ADLs/IADLs and return to work  Patient goal: To get arm back to 100%        Objective     Palpation     Right   Tenderness of the triceps and wrist extensors  Neurological Testing     Sensation     Elbow   Left Elbow   Intact: light touch    Right Elbow   Intact: light touch    Active Range of Motion   Left Shoulder   Flexion: WFL  Abduction: WFL  External rotation BTH: WFL  Internal rotation BTB: WFL    Right Shoulder   Flexion: WFL  Abduction: WFL  External rotation BTH: WFL  Internal rotation BTB: WFL    Left Elbow   Flexion: WFL  Extension: WFL    Right Elbow   Flexion: 85 degrees with pain  Extension: 14 degrees with pain    Left Wrist   Wrist flexion: 80 degrees   Wrist extension: 66 degrees     Right Wrist   Wrist flexion: 70 degrees with pain  Wrist extension: 54 degrees with pain    Strength/Myotome Testing   Cervical Spine     Left   Interossei strength (t1): 5    Right   Interossei strength (t1): 4    Left Elbow   Flexion: 5  Extension: 5    Right Elbow   Flexion: 3+  Extension: 3+    Left Wrist/Hand   Wrist extension: 5  Wrist flexion: 5  Radial deviation: 5  Ulnar deviation: 5     (2nd hand position)     Trial 1: 60    Trial 2: 50    Trial 3: 50    Right Wrist/Hand   Wrist extension: 3  Wrist flexion: 3  Radial deviation: 4-  Ulnar deviation: 3+     (2nd hand position)     Trial 1: 7 5    Trial 2: 7 5    Trial 3: 10    Tests     Right Elbow   Positive Cozen's                Precautions: None    Re-eval Date: 10/1/2022      Manuals 8-10 8-   R elbow and wrist PROM 8' 10' 8' 8' 8' 8'  8' 8' 8'   IASTM lateral elbow Neuro Re-Ed 8-10 8-16  8-24 8-31        MTP/LTP MTP/LTP  Green  2x10 MTP/LTP  Green   2x10 MTP/LTP  Green   2x10 Green 2x10 ea Green  2x10  Green  2x10   MTP/LTP  Green  2x10 MTP/LTP  Green  2x10 MTP/LTP  Green  2x10   Triceps extension TB or matrix    Red 2x5 Red 2x5 Green 2x5                    Ther Ex 8-10   8-24 8-31        UBE 10' w/rests 8' w/rests 8' w/ rests 8' w/rests 8' alt  No rests 8' alt  8' alt nv 8'alt   Wrist flex stretch 3x20" 3x20" 3x20" 3x20 3x20' 3x20"  3x20" 3x20" 3 x20"    Wrist flex/ext AROM 1# 2x10 1# 2x10 1# 2x10 1#  2x10 2x10   1# 2x10 1# 2x10 1# 2x10   Wrist UD/RD AROM             Wrist supination/pronation AROM 1# 2x10 1# 2x10 2# 2x10 2#  2x10   2# 2x10  1# on cane x10 1# on cane 2x10  1# 2x10 1# 2x10 1# 2X10   Elbow flex/ext AROM 2# 2x10 2# 2x10 2# 2x10 2#  1x12 2# 2x10 2# 2x10  2# 2x10 2# 2x10 2# 2x10   Eccentric wrist extension 2# 2x10 2# 2x10 2# 2x10 2#  2x10 2#  2x10 2# 2x10  2# 2x10 2# 2x10 2# 2X10    Digiflex Green  x20 Green x20 Green x20 Green  x20 Green  4x5 Green x20  Green x20 Green x20 Green  x20   Therabar             Putty: , roll, pinch  NV Yellow 1' ea Yellow  1' ea Yellow  1' ea Yellow  1' ea       Power web             Ther Activity                                       Gait Training                                       Modalities             CP home home  Home

## 2022-09-06 ENCOUNTER — APPOINTMENT (OUTPATIENT)
Dept: PHYSICAL THERAPY | Facility: HOME HEALTHCARE | Age: 42
End: 2022-09-06
Payer: OTHER MISCELLANEOUS

## 2022-09-07 ENCOUNTER — OFFICE VISIT (OUTPATIENT)
Dept: PHYSICAL THERAPY | Facility: HOME HEALTHCARE | Age: 42
End: 2022-09-07
Payer: OTHER MISCELLANEOUS

## 2022-09-07 DIAGNOSIS — M77.11 LATERAL EPICONDYLITIS OF RIGHT ELBOW: Primary | ICD-10-CM

## 2022-09-07 PROCEDURE — 97140 MANUAL THERAPY 1/> REGIONS: CPT

## 2022-09-07 PROCEDURE — 97110 THERAPEUTIC EXERCISES: CPT

## 2022-09-07 PROCEDURE — 97112 NEUROMUSCULAR REEDUCATION: CPT

## 2022-09-07 NOTE — PROGRESS NOTES
Daily Note     Today's date: 2022  Patient name: Vandana Cadet  : 1980  MRN: [id number]  Referring provider: Andrew Mccain PA-C  Dx: No diagnosis found  Start Time: 8          Subjective: My elbow is pretty sore today  I took Motrin this morning and the pain is still about 6/10  I have been working 12 hr shifts  Objective: See treatment diary below    Assessment: Tolerated treatment fair  Pt with request to hold UBE and to reduce reps of a few ex 2* increased R elbow pain today from work activities  Pt with pain evident in all planes of R elbow PROM with most pain occurring during sup/pro and flex/ext end ranges  Pt to use Motrin and CP at home for pain relief  Patient would benefit from continued PT    Plan: Continue per plan of care        Precautions: None    Re-eval Date: 10/1/2022      Manuals 8-10 8- 9-7    R elbow and wrist PROM 8' 10' 8' 8' 8' 8' 8' 8' 8' 8'   IASTM lateral elbow                                       Neuro Re-Ed 8-10 8  9-7      MTP/LTP MTP/LTP  Green  2x10 MTP/LTP  Green   2x10 MTP/LTP  Green   2x10 Green 2x10 ea Green  2x10  Green  2x10  Green  2x10 MTP/LTP  Green  2x10 MTP/LTP  Green  2x10 MTP/LTP  Green  2x10   Triceps extension TB or matrix    Red 2x5 Red 2x5 Green 2x5 Red  2x5                   Ther Ex 8-10   8-24 8-31  9-7      UBE 10' w/rests 8' w/rests 8' w/ rests 8' w/rests 8' alt  No rests 8' alt Declined 8' alt nv 8'alt   Wrist flex stretch 3x20" 3x20" 3x20" 3x20 3x20' 3x20" 3x20" 3x20" 3x20" 3 x20"    Wrist flex/ext AROM 1# 2x10 1# 2x10 1# 2x10 1#  2x10 2x10 1# 2x10 1# 2x10 1# 2x10 1# 2x10 1# 2x10   Wrist UD/RD AROM             Wrist supination/pronation AROM 1# 2x10 1# 2x10 2# 2x10 2#  2x10   2# 2x10  1# on cane x10 1# on cane 2x10 DB 2#  x8  1# 2x10 1# 2x10 1# 2X10   Elbow flex/ext AROM 2# 2x10 2# 2x10 2# 2x10 2#  1x12 2# 2x10 2# 2x10 2# 1x8 2# 2x10 2# 2x10 2# 2x10   Eccentric wrist extension 2# 2x10 2# 2x10 2# 2x10 2#  2x10 2#  2x10 2# 2x10 2# 2x10 2# 2x10 2# 2x10 2# 2X10    Digiflex Green  x20 Green x20 Green x20 Green  x20 Green  4x5 Green x20 Green  x20 Green x20 Green x20 Green  x20   Jena Craw             Putty: , roll, pinch  NV Yellow 1' ea Yellow  1' ea Yellow  1' ea Yellow  1' ea Yellow  1' ea      Power web             Ther Activity                                       Gait Training                                       Modalities             CP home home  Home

## 2022-09-14 ENCOUNTER — OFFICE VISIT (OUTPATIENT)
Dept: PHYSICAL THERAPY | Facility: HOME HEALTHCARE | Age: 42
End: 2022-09-14
Payer: OTHER MISCELLANEOUS

## 2022-09-14 DIAGNOSIS — M77.11 LATERAL EPICONDYLITIS OF RIGHT ELBOW: Primary | ICD-10-CM

## 2022-09-14 PROCEDURE — 97110 THERAPEUTIC EXERCISES: CPT

## 2022-09-14 PROCEDURE — 97112 NEUROMUSCULAR REEDUCATION: CPT

## 2022-09-14 NOTE — PROGRESS NOTES
Daily Note     Today's date: 2022  Patient name: Verner Conch  : 1980  MRN: [id number]  Referring provider: Dhiraj Brooks PA-C  Dx: No diagnosis found  Start Time: 1130          Subjective: I didn't work yesterday so I'm not to bad  I have good days and bad days  Objective: See treatment diary below    Assessment: Tolerated treatment well  Pt with report of soreness more than pain t/o session  Pt able to complete TE with minimal VC's  Pt progressing slowly with progress but has shown improvements in pain free AROM since SOC  Patient would benefit from continued PT    Plan: Continue per plan of care        Precautions: None    Re-eval Date: 10/1/2022      Manuals 8-10 8-16 8/22 8-24 8-31 9/1 9-7 9-14 8/1 8/2   R elbow and wrist PROM 8' 10' 8' 8' 8' 8' 8' Declined to home 8' 8'   IASTM lateral elbow                                       Neuro Re-Ed 8-10 8-16  8-24 8-31  9-7 9-14     MTP/LTP MTP/LTP  Green  2x10 MTP/LTP  Green   2x10 MTP/LTP  Green   2x10 Green 2x10 ea Green  2x10  Green  2x10  Green  2x10 Green  2x10 MTP/LTP  Green  2x10 MTP/LTP  Green  2x10   Triceps extension TB or matrix    Red 2x5 Red 2x5 Green 2x5 Red  2x5 Red 2x5                  Ther Ex 8-10   8-24 8-31  9 9-14     UBE 10' w/rests 8' w/rests 8' w/ rests 8' w/rests 8' alt  No rests 8' alt Declined 10' ald nv 8'alt   Wrist flex stretch 3x20" 3x20" 3x20" 3x20 3x20' 3x20" 3x20" 3x20" 3x20" 3 x20"    Wrist flex/ext AROM 1# 2x10 1# 2x10 1# 2x10 1#  2x10 2x10 1# 2x10 1# 2x10 1# 2x10 1# 2x10 1# 2x10   Wrist UD/RD AROM             Wrist supination/pronation AROM 1# 2x10 1# 2x10 2# 2x10 2#  2x10   2# 2x10  1# on cane x10 1# on cane 2x10 DB 2#  x8  DB 2#  2x8 1# 2x10 1# 2X10   Elbow flex/ext AROM 2# 2x10 2# 2x10 2# 2x10 2#  1x12 2# 2x10 2# 2x10 2# 1x8 2# 2x10 2# 2x10 2# 2x10   Eccentric wrist extension 2# 2x10 2# 2x10 2# 2x10 2#  2x10 2#  2x10 2# 2x10 2# 2x10 2# 2x10 2# 2x10 2# 2X10    Digiflex Green  x20 Green x20 Green x20 Green  x20 Green  4x5 Green x20 Green  x20 Green x20 Green x20 Green  x20   Therabar             Putty: , roll, pinch  NV Yellow 1' ea Yellow  1' ea Yellow  1' ea Yellow  1' ea Yellow  1' ea Yellow  1' ea     Power web             Ther Activity                                       Gait Training                                       Modalities             CP home home  Home

## 2022-09-15 ENCOUNTER — OFFICE VISIT (OUTPATIENT)
Dept: OBGYN CLINIC | Facility: CLINIC | Age: 42
End: 2022-09-15
Payer: OTHER MISCELLANEOUS

## 2022-09-15 ENCOUNTER — APPOINTMENT (OUTPATIENT)
Dept: PHYSICAL THERAPY | Facility: HOME HEALTHCARE | Age: 42
End: 2022-09-15
Payer: OTHER MISCELLANEOUS

## 2022-09-15 VITALS
WEIGHT: 203.8 LBS | BODY MASS INDEX: 34.79 KG/M2 | SYSTOLIC BLOOD PRESSURE: 126 MMHG | HEIGHT: 64 IN | DIASTOLIC BLOOD PRESSURE: 82 MMHG

## 2022-09-15 DIAGNOSIS — M77.11 LATERAL EPICONDYLITIS OF RIGHT ELBOW: Primary | ICD-10-CM

## 2022-09-15 PROCEDURE — 99214 OFFICE O/P EST MOD 30 MIN: CPT | Performed by: ORTHOPAEDIC SURGERY

## 2022-09-15 NOTE — LETTER
September 15, 2022     Patient: Kelby Galloway  YOB: 1980  Date of Visit: 9/15/2022      To Whom it May Concern:    Kelby Galloway is under my professional care  Haroldo Gavinotonio was seen in my office on 9/15/2022  Haroldo Kahn may return to work on 9/16/2022  If you have any questions or concerns, please don't hesitate to call           Sincerely,          Makeda Burgos        CC: [place]

## 2022-09-15 NOTE — PROGRESS NOTES
[address] [address]  [phone]       Clemencia Mcdaniels  [id number]  1980    ORTHOPAEDIC SURGERY OUTPATIENT NOTE  9/15/2022      HISTORY:  38 y o  female who presents to the office today for a follow-up evalaution of her right elbow lateral epicondylitis  This is a worker's compensation case  She underwent a right elbow lateral epicondyle corticosteroid injection on 2022 with no relief  She has been compliant with attending formal physical therapy  She states that she return to full duty work status and experience increased pain over the lateral aspect of her right elbow  She states that she will experience a daily constant sharp pain located over the medial and lateral aspects of her right elbow  She also notes stiffness about her elbow      Past Medical History:   Diagnosis Date    Asthma     Diabetes mellitus ([facility] [address] )        Past Surgical History:   Procedure Laterality Date    ADENOIDECTOMY       SECTION      COSMETIC SURGERY      stomach- lipo and fat transfer    FOOT FRACTURE SURGERY      TYMPANOSTOMY TUBE PLACEMENT      fell out       Social History     Socioeconomic History    Marital status: Single     Spouse name: Not on file    Number of children: Not on file    Years of education: Not on file    Highest education level: GED or equivalent   Occupational History     Comment:    Tobacco Use    Smoking status: Current Every Day Smoker     Packs/day: 1 00     Years: 15 00     Pack years: 15 00    Smokeless tobacco: Never Used   Vaping Use    Vaping Use: Never used   Substance and Sexual Activity    Alcohol use: Not Currently    Drug use: Never    Sexual activity: Yes     Partners: Male   Other Topics Concern    Not on file   Social History Narrative    Lives in house with  and 2 kids     Social Determinants of Health     Financial Resource Strain: Not on file   Food Insecurity: Not on file   Transportation Needs: Not on file   Physical Activity: Not on file   Stress: Not on file   Social Connections: Not on file   Intimate Partner Violence: Not on file   Housing Stability: Not on file       Family History   Problem Relation Age of Onset    Hypertension Mother     Diabetes Mother     Diabetes Father     No Known Problems Half-Brother     No Known Problems Half-Brother         Patient's Medications   New Prescriptions    No medications on file   Previous Medications    DICLOFENAC SODIUM (VOLTAREN) 1 %    Apply 2 g topically 4 (four) times a day    HALOBETASOL (ULTRAVATE) 0 05 % CREAM    Apply topically 2 (two) times a day    SITAGLIPTIN (JANUVIA) 100 MG TABLET    Take 100 mg by mouth daily   Modified Medications    No medications on file   Discontinued Medications    No medications on file       No Known Allergies     /82   Ht 5' 4" (1 626 m)   Wt 92 4 kg (203 lb 12 8 oz)   BMI 34 98 kg/m²      REVIEW OF SYSTEMS:  Constitutional: Negative  HEENT: Negative  Respiratory: Negative  Skin: Negative  Neurological: Negative  Psychiatric/Behavioral: Negative  Musculoskeletal: Negative except for that mentioned in the HPI  PHYSICAL EXAM:      /82   Ht 5' 4" (1 626 m)   Wt 92 4 kg (203 lb 12 8 oz)   BMI 34 98 kg/m²   Gen: Alert and oriented to person, place, time  HEENT: EOMI, eyes clear, moist mucus membranes, hearing intact  Respiratory: Bilateral chest rise   No audible wheezing found  Cardiovascular: Regular Rate and Rhythm  Abdomen: soft nontender/nondistended    R elbow:  Flexion: 140 degrees  Extension: 0 degrees  Pronation: 80 degrees  Supination: 80 degrees    TTP Lateral Epicondyle: positive   TTP Medial Epicondyle: negative  TTP Olecranon: negative  TTP Radial Head: negative  TTP Biceps Tendon: negative    Strength:  Flexion: 5/5  Extension: 5/5  Pronation: 5/5  Supination: 5/5    Pain with resisted wrist extension: negative  Pain with resisted 3rd finger extension: negative  Pain with resisted wrist flexion: negative    Varus laxity: negative  Valgus laxity: negative  Milking maneuver: negative  Moving valgus stress test: negative    Cubital tunnel Tinel's: negative    Radial/median/ulnar nerve intact    <2 sec cap refill    IMAGING:  No new images reviewed today  ASSESSMENT AND PLAN:  38 y o  female right elbow lateral epicondylitis    This is a worker's compensation case  Unfortunately, she has failed to see pain relief in the forms of formal physical therapy and a corticosteroid injection that was administered on 08/04/2022  I discussed with the patient at this time I recommend her following up with Dr Julia Elizabeth for a Tenex procedure  A referral was placed at today's visit  I discussed with the patient that I would like her to bring in the MRI for me to review  She understood and had no further questions      Scribe Attestation    I,:  Mary Shelton am acting as a scribe while in the presence of the attending physician :       I,:  Renee Perez personally performed the services described in this documentation    as scribed in my presence :

## 2022-09-18 ENCOUNTER — HOSPITAL ENCOUNTER (EMERGENCY)
Facility: HOSPITAL | Age: 42
Discharge: HOME | End: 2022-09-18
Attending: EMERGENCY MEDICINE
Payer: COMMERCIAL

## 2022-09-18 ENCOUNTER — APPOINTMENT (OUTPATIENT)
Dept: RADIOLOGY | Facility: HOSPITAL | Age: 42
End: 2022-09-18
Payer: COMMERCIAL

## 2022-09-18 VITALS
WEIGHT: 203 LBS | DIASTOLIC BLOOD PRESSURE: 92 MMHG | TEMPERATURE: 97.5 F | HEART RATE: 85 BPM | SYSTOLIC BLOOD PRESSURE: 126 MMHG | RESPIRATION RATE: 21 BRPM | OXYGEN SATURATION: 97 % | BODY MASS INDEX: 34.66 KG/M2 | HEIGHT: 64 IN

## 2022-09-18 DIAGNOSIS — R05.9 COUGH: Primary | ICD-10-CM

## 2022-09-18 LAB
FLUAV RNA RESP QL NAA+PROBE: NEGATIVE
FLUBV RNA RESP QL NAA+PROBE: NEGATIVE
RSV RNA RESP QL NAA+PROBE: NEGATIVE
SARS-COV-2 RNA RESP QL NAA+PROBE: NEGATIVE

## 2022-09-18 PROCEDURE — 0241U HB NFCT DS VIR RESP RNA 4 TRGT: CPT | Performed by: EMERGENCY MEDICINE

## 2022-09-18 PROCEDURE — 99284 EMERGENCY DEPT VISIT MOD MDM: CPT | Performed by: EMERGENCY MEDICINE

## 2022-09-18 PROCEDURE — 71045 X-RAY EXAM CHEST 1 VIEW: CPT

## 2022-09-18 PROCEDURE — 99284 EMERGENCY DEPT VISIT MOD MDM: CPT

## 2022-09-18 RX ORDER — SOFT LENS DISINFECTANT
SOLUTION, NON-ORAL MISCELLANEOUS AS NEEDED
Qty: 1 EACH | Refills: 0 | Status: SHIPPED | OUTPATIENT
Start: 2022-09-18

## 2022-09-18 RX ORDER — ALBUTEROL SULFATE 2.5 MG/3ML
2.5 SOLUTION RESPIRATORY (INHALATION) EVERY 6 HOURS PRN
Qty: 75 ML | Refills: 0 | Status: SHIPPED | OUTPATIENT
Start: 2022-09-18

## 2022-09-18 NOTE — Clinical Note
Riya Valdez was seen and treated in our emergency department on 9/18/2022  Diagnosis:     Kristyn Arce  may return to work on return date  She may return on this date: 09/19/2022    COVID-19 test negative  If you have any questions or concerns, please don't hesitate to call        Sb Garcia DO    ______________________________           _______________          _______________  Hospital Representative                              Date                                Time

## 2022-09-19 NOTE — ED PROVIDER NOTES
History  Chief Complaint   Patient presents with    Shortness of Breath     Sob for 2 days   tested positive for covid yesterday  History of asthma  Sob worse with exertion     40-year-old female presents for evaluation cough, COVID-19  Patient reports for the last few days she has had a small sputum producing cough, sputum is nonbloody  Patient has an intermittent sensation of dyspnea, she denies chest pain  Patient denies history of VTE, lower extremity pain or swelling  She also admits to a sore throat  She denies fevers or chills, abdominal pain, nausea vomiting, diarrhea, body aches  Patient reports her  tested positive for COVID-19 today  Patient does have history of asthma, she has a relief inhaler at home however states she has not needed to use it  Prior to Admission Medications   Prescriptions Last Dose Informant Patient Reported? Taking? Diclofenac Sodium (VOLTAREN) 1 %   No No   Sig: Apply 2 g topically 4 (four) times a day   halobetasol (ULTRAVATE) 0 05 % cream   No No   Sig: Apply topically 2 (two) times a day   sitaGLIPtin (JANUVIA) 100 mg tablet   Yes Yes   Sig: Take 100 mg by mouth daily      Facility-Administered Medications: None       Past Medical History:   Diagnosis Date    Asthma     Diabetes mellitus ([facility] [address] )        Past Surgical History:   Procedure Laterality Date    ADENOIDECTOMY       SECTION      COSMETIC SURGERY      stomach- lipo and fat transfer    FOOT FRACTURE SURGERY      TYMPANOSTOMY TUBE PLACEMENT      fell out       Family History   Problem Relation Age of Onset    Hypertension Mother     Diabetes Mother     Diabetes Father     No Known Problems Half-Brother     No Known Problems Half-Brother      I have reviewed and agree with the history as documented      E-Cigarette/Vaping    E-Cigarette Use Never User      E-Cigarette/Vaping Substances    Nicotine No     THC No     CBD No     Flavoring No     Other No     Unknown No Social History     Tobacco Use    Smoking status: Current Every Day Smoker     Packs/day: 1 00     Years: 15 00     Pack years: 15 00    Smokeless tobacco: Never Used   Vaping Use    Vaping Use: Never used   Substance Use Topics    Alcohol use: Not Currently    Drug use: Never       Review of Systems   Constitutional: Negative for activity change, appetite change, chills and fever  HENT: Positive for sore throat  Negative for congestion  Respiratory: Positive for cough and shortness of breath  Cardiovascular: Negative for chest pain and leg swelling  Gastrointestinal: Negative for abdominal pain, diarrhea, nausea and vomiting  Musculoskeletal: Negative for myalgias  Neurological: Negative for headaches  All other systems reviewed and are negative  Physical Exam  Physical Exam  Vitals reviewed  Constitutional:       General: She is not in acute distress  Appearance: She is well-developed  She is not ill-appearing, toxic-appearing or diaphoretic  HENT:      Head: Normocephalic and atraumatic  Right Ear: External ear normal       Left Ear: External ear normal    Eyes:      General: No scleral icterus  Right eye: No discharge  Left eye: No discharge  Cardiovascular:      Rate and Rhythm: Normal rate and regular rhythm  Pulmonary:      Effort: Pulmonary effort is normal  No respiratory distress  Breath sounds: Normal breath sounds  No decreased breath sounds, wheezing, rhonchi or rales  Musculoskeletal:         General: No deformity or signs of injury  Right lower leg: No edema  Left lower leg: No edema  Skin:     General: Skin is warm  Coloration: Skin is not jaundiced or pale  Neurological:      General: No focal deficit present  Mental Status: She is alert  Mental status is at baseline        Gait: Gait normal          Vital Signs  ED Triage Vitals [09/18/22 2013]   Temperature Pulse Respirations Blood Pressure SpO2   97 5 °F (36 4 °C) 85 21 126/92 97 %      Temp Source Heart Rate Source Patient Position - Orthostatic VS BP Location FiO2 (%)   Tympanic Monitor Lying Right arm --      Pain Score       --           Vitals:    09/18/22 2013   BP: 126/92   Pulse: 85   Patient Position - Orthostatic VS: Lying         Visual Acuity      ED Medications  Medications - No data to display    Diagnostic Studies  Results Reviewed     Procedure Component Value Units Date/Time    FLU/RSV/COVID - if FLU/RSV clinically relevant [[id number]]     Lab Status: No result Specimen: Nares from Nose                  XR chest 1 view portable    (Results Pending)              Procedures  Procedures         ED Course  ED Course as of 09/18/22 2123   Sun Sep 18, 2022 2102 XR chest 1 view portable  No consolidations, cardiomegaly, effusions, ptx   2123 SARS-COV-2: Negative                                             MDM  Number of Diagnoses or Management Options  Cough  Diagnosis management comments: 55-year-old female presents for evaluation intermittent dyspnea, cough, sore throat  Patient reports her  tested positive for COVID-19 infection today, she is seeking testing  Patient is low risk for VTE, ACS; she declines additional testing at this time  Patient wants to wait for results as she does not have a [facility] tony and needs to either be cleared to return to work with negative results or work note with positive result  Patient has a relief inhaler at home, she does state her nebulizer is broken, will attempt to prescribe 1 for her, will additionally prescribe nebulizer solution if needed  Disposition  Final diagnoses:   None     ED Disposition     None      Follow-up Information    None         Patient's Medications   Discharge Prescriptions    No medications on file       No discharge procedures on file      PDMP Review     None          ED Provider  Electronically Signed by           Aman Weston DO  09/19/22 8094

## 2022-09-20 ENCOUNTER — APPOINTMENT (OUTPATIENT)
Dept: PHYSICAL THERAPY | Facility: HOME HEALTHCARE | Age: 42
End: 2022-09-20
Payer: OTHER MISCELLANEOUS

## 2022-09-21 ENCOUNTER — APPOINTMENT (OUTPATIENT)
Dept: PHYSICAL THERAPY | Facility: HOME HEALTHCARE | Age: 42
End: 2022-09-21
Payer: OTHER MISCELLANEOUS

## 2022-09-21 ENCOUNTER — HOSPITAL ENCOUNTER (EMERGENCY)
Facility: HOSPITAL | Age: 42
Discharge: HOME | End: 2022-09-21
Attending: EMERGENCY MEDICINE
Payer: COMMERCIAL

## 2022-09-21 VITALS
HEART RATE: 101 BPM | DIASTOLIC BLOOD PRESSURE: 85 MMHG | TEMPERATURE: 98.2 F | RESPIRATION RATE: 19 BRPM | SYSTOLIC BLOOD PRESSURE: 141 MMHG | OXYGEN SATURATION: 100 %

## 2022-09-21 DIAGNOSIS — J45.901 ASTHMA EXACERBATION: Primary | ICD-10-CM

## 2022-09-21 DIAGNOSIS — U07.1 COVID-19: ICD-10-CM

## 2022-09-21 LAB — SARS-COV-2 RNA RESP QL NAA+PROBE: POSITIVE

## 2022-09-21 PROCEDURE — 94640 AIRWAY INHALATION TREATMENT: CPT

## 2022-09-21 PROCEDURE — 99284 EMERGENCY DEPT VISIT MOD MDM: CPT

## 2022-09-21 PROCEDURE — U0003 INFECTIOUS AGENT DETECTION BY NUCLEIC ACID (DNA OR RNA); SEVERE ACUTE RESPIRATORY SYNDROME CORONAVIRUS 2 (SARS-COV-2) (CORONAVIRUS DISEASE [COVID-19]), AMPLIFIED PROBE TECHNIQUE, MAKING USE OF HIGH THROUGHPUT TECHNOLOGIES AS DESCRIBED BY CMS-2020-01-R: HCPCS | Performed by: EMERGENCY MEDICINE

## 2022-09-21 PROCEDURE — 99284 EMERGENCY DEPT VISIT MOD MDM: CPT | Performed by: EMERGENCY MEDICINE

## 2022-09-21 PROCEDURE — U0005 INFEC AGEN DETEC AMPLI PROBE: HCPCS | Performed by: EMERGENCY MEDICINE

## 2022-09-21 RX ORDER — ALBUTEROL SULFATE 2.5 MG/3ML
2.5 SOLUTION RESPIRATORY (INHALATION) ONCE
Status: COMPLETED | OUTPATIENT
Start: 2022-09-21 | End: 2022-09-21

## 2022-09-21 RX ORDER — PREDNISONE 20 MG/1
50 TABLET ORAL DAILY
Qty: 10 TABLET | Refills: 0 | Status: SHIPPED | OUTPATIENT
Start: 2022-09-22 | End: 2022-09-26

## 2022-09-21 RX ADMIN — PREDNISONE 50 MG: 20 TABLET ORAL at 23:33

## 2022-09-21 RX ADMIN — ALBUTEROL SULFATE 2.5 MG: 2.5 SOLUTION RESPIRATORY (INHALATION) at 22:45

## 2022-09-21 NOTE — Clinical Note
Juvenal Baer was seen and treated in our emergency department on 9/21/2022  Diagnosis:     Cele Reusing    She may return on this date: If you have any questions or concerns, please don't hesitate to call        Valerie Koroma MD    ______________________________           _______________          _______________  Hospital Representative                              Date                                Time

## 2022-09-21 NOTE — Clinical Note
Gayatri Nuñez was seen and treated in our emergency department on 9/21/2022  Diagnosis:     Maria Esther Cosme    She may return on this date: If you have any questions or concerns, please don't hesitate to call        Guanako Stephenson MD    ______________________________           _______________          _______________  Hospital Representative                              Date                                Time

## 2022-09-21 NOTE — Clinical Note
Thomas Haque was seen and treated in our emergency department on 9/21/2022  Diagnosis:     Kun Giron    She may return on this date:     Excused on 9/21, 9/22, 9/23 given covid + status  Also excused until without fever for 24 hours before returning  If you have any questions or concerns, please don't hesitate to call        Karl Membreno MD    ______________________________           _______________          _______________  Hospital Representative                              Date                                Time

## 2022-09-21 NOTE — Clinical Note
Eli Ortiz was seen and treated in our emergency department on 9/21/2022  Diagnosis:     [facility] [place]    She may return on this date: If you have any questions or concerns, please don't hesitate to call        Valencia Alston MD    ______________________________           _______________          _______________  Hospital Representative                              Date                                Time

## 2022-09-22 NOTE — ED PROVIDER NOTES
History  Chief Complaint   Patient presents with    Shortness of Breath     Patient states her  has covid and wants a test, and also a breathing treatment  Already had a chest xray and does not want one     68-year-old female with a past medical history of asthma, diabetes, who presents for shortness of breath  Patient was recently seen on 09/18 after her  was found positive for COVID-19  She was tested then, and had a negative test   She has had a chest x-ray at that time and was negative  She has continued to have symptoms, and has no eyes difficulty breathing not responsive to her inhaler at home, so she is requesting a nebulizer treatment here  Patient does not want x-ray, and when specifically recommending 1, she declined  She describes subjective fevers, chills, congestion, cough  No nausea vomiting abdominal pain, no diarrhea  No chest pain  She is not COVID vaccinated  ROS otherwise negative  Prior to Admission Medications   Prescriptions Last Dose Informant Patient Reported? Taking?    Diclofenac Sodium (VOLTAREN) 1 % Past Week at Unknown time  No Yes   Sig: Apply 2 g topically 4 (four) times a day   Respiratory Therapy Supplies (Nebulizer) Tatyana Shane Not Taking at Unknown time  No No   Sig: Use as needed (Shortness of breath, wheezing)   Patient not taking: Reported on 9/21/2022   albuterol (2 5 mg/3 mL) 0 083 % nebulizer solution 9/21/2022 at Unknown time  No Yes   Sig: Take 3 mL (2 5 mg total) by nebulization every 6 (six) hours as needed for wheezing or shortness of breath   halobetasol (ULTRAVATE) 0 05 % cream Past Week at Unknown time  No Yes   Sig: Apply topically 2 (two) times a day   sitaGLIPtin (JANUVIA) 100 mg tablet Past Week at Unknown time  Yes Yes   Sig: Take 100 mg by mouth daily      Facility-Administered Medications: None       Past Medical History:   Diagnosis Date    Asthma     Diabetes mellitus ([facility] [address] )        Past Surgical History:   Procedure Laterality Date  ADENOIDECTOMY       SECTION      COSMETIC SURGERY      stomach- lipo and fat transfer    FOOT FRACTURE SURGERY      TYMPANOSTOMY TUBE PLACEMENT      fell out       Family History   Problem Relation Age of Onset    Hypertension Mother     Diabetes Mother     Diabetes Father     No Known Problems Half-Brother     No Known Problems Half-Brother      I have reviewed and agree with the history as documented  E-Cigarette/Vaping    E-Cigarette Use Never User      E-Cigarette/Vaping Substances    Nicotine No     THC No     CBD No     Flavoring No     Other No     Unknown No      Social History     Tobacco Use    Smoking status: Current Every Day Smoker     Packs/day: 1 00     Years: 15 00     Pack years: 15 00    Smokeless tobacco: Never Used   Vaping Use    Vaping Use: Never used   Substance Use Topics    Alcohol use: Not Currently    Drug use: Never       Review of Systems   Constitutional: Positive for chills and fever  HENT: Positive for congestion  Negative for rhinorrhea and sore throat  Respiratory: Positive for cough and shortness of breath  Cardiovascular: Negative for chest pain and palpitations  Gastrointestinal: Negative for abdominal pain, constipation, diarrhea, nausea and vomiting  Genitourinary: Negative for difficulty urinating and flank pain  Musculoskeletal: Positive for myalgias  Negative for arthralgias  Neurological: Negative for dizziness, weakness, light-headedness and headaches  Psychiatric/Behavioral: Negative for agitation, behavioral problems and confusion  All other systems reviewed and are negative  Physical Exam  Physical Exam  Constitutional:       Appearance: She is well-developed  HENT:      Head: Normocephalic and atraumatic  Mouth/Throat:      Mouth: Mucous membranes are moist    Eyes:      Pupils: Pupils are equal, round, and reactive to light  Cardiovascular:      Rate and Rhythm: Regular rhythm  Tachycardia present  Heart sounds: Normal heart sounds  No murmur heard  No friction rub  Pulmonary:      Effort: Pulmonary effort is normal  No respiratory distress  Breath sounds: Examination of the right-upper field reveals wheezing  Examination of the left-upper field reveals wheezing  Examination of the right-middle field reveals wheezing  Examination of the left-middle field reveals wheezing  Examination of the right-lower field reveals wheezing  Examination of the left-lower field reveals wheezing  Wheezing present  No decreased breath sounds, rhonchi or rales  Comments: Very minimal wheezing present on exam   Abdominal:      General: Bowel sounds are normal  There is no distension  Palpations: Abdomen is soft  Tenderness: There is no abdominal tenderness  Musculoskeletal:         General: Normal range of motion  Cervical back: Normal range of motion and neck supple  Right lower leg: No tenderness  No edema  Left lower leg: No tenderness  No edema  Skin:     General: Skin is warm  Neurological:      Mental Status: She is alert and oriented to person, place, and time  Coordination: Coordination normal    Psychiatric:         Behavior: Behavior normal          Thought Content:  Thought content normal          Judgment: Judgment normal          Vital Signs  ED Triage Vitals [09/21/22 2235]   Temperature Pulse Respirations Blood Pressure SpO2   98 2 °F (36 8 °C) 81 18 140/85 99 %      Temp Source Heart Rate Source Patient Position - Orthostatic VS BP Location FiO2 (%)   Tympanic Monitor Sitting Right arm --      Pain Score       No Pain           Vitals:    09/21/22 2235 09/21/22 2335   BP: 140/85 141/85   Pulse: 81 101   Patient Position - Orthostatic VS: Sitting Sitting         Visual Acuity      ED Medications  Medications   albuterol inhalation solution 2 5 mg (2 5 mg Nebulization Given 9/21/22 2245)   predniSONE tablet 50 mg (50 mg Oral Given 9/21/22 2333)       Diagnostic Studies  Results Reviewed     Procedure Component Value Units Date/Time    COVID only [[id number]]  (Abnormal) Collected: 09/21/22 2234    Lab Status: Final result Specimen: Nares from Nose Updated: 09/21/22 2318     SARS-CoV-2 Positive    Narrative:      FOR PEDIATRIC PATIENTS - copy/paste COVID Guidelines URL to browser: [email or web address]    SARS-CoV-2 assay is a Nucleic Acid Amplification assay intended for the  qualitative detection of nucleic acid from SARS-CoV-2 in nasopharyngeal  swabs  Results are for the presumptive identification of SARS-CoV-2 RNA  Positive results are indicative of infection with SARS-CoV-2, the virus  causing COVID-19, but do not rule out bacterial infection or co-infection  with other viruses  Laboratories within the [place] and its  territories are required to report all positive results to the appropriate  public health authorities  Negative results do not preclude SARS-CoV-2  infection and should not be used as the sole basis for treatment or other  patient management decisions  Negative results must be combined with  clinical observations, patient history, and epidemiological information  This test has not been FDA cleared or approved  This test has been authorized by FDA under an Emergency Use Authorization  (EUA)  This test is only authorized for the duration of time the  declaration that circumstances exist justifying the authorization of the  emergency use of an in vitro diagnostic tests for detection of SARS-CoV-2  virus and/or diagnosis of COVID-19 infection under section 564(b)(1) of  the Act, 21 U  S C  239EDI-8(Q)(4), unless the authorization is terminated  or revoked sooner  The test has been validated but independent review by FDA  and CLIA is pending  Test performed using [employer] GeneXpert: This RT-PCR assay targets N2,  a region unique to SARS-CoV-2   A conserved region in the E-gene was chosen  for pan-Sarbecovirus detection which includes SARS-CoV-2  According to CMS-2020-01-R, this platform meets the definition of high-throughput technology  No orders to display              Procedures  Procedures         ED Course  ED Course as of 09/22/22 0059   Wed Sep 21, 2022   2323 SARS-COV-2(!): Positive  Patient does not want paxlovid, was offered and explained risks and benefits, she declined  MDM  Number of Diagnoses or Management Options  Asthma exacerbation  COVID-19  Diagnosis management comments: Patient tested positive for COVID-19 here  I did offer a chest x-ray however she declined this  I offered Paxlovid, explain that this is an experimental treatment but there is evidence that this can help prevent progression of disease, however she declined this as well, she did not want information packet that I also offered  Patient did request an albuterol nebulizer treatment, will provide  Patient did report positive relief with this, given extensive history of asthma which she describes prior intubations or hospitalizations, will treat with 5 day prednisone course  Patient is asking for work excuse, will write this  Will discharge strict return precautions, recommended PCP follow-up for further management of her COVID-19 and for potential monoclonal antibody therapy  Disposition  Final diagnoses:   Asthma exacerbation   COVID-19     Time reflects when diagnosis was documented in both MDM as applicable and the Disposition within this note     Time User Action Codes Description Comment    9/21/2022 11:30 PM Daphne Lyle Add [F71 200] Asthma exacerbation     9/21/2022 11:30 PM [facility], [address] [place] [address] [U07 1] COVID-19       ED Disposition     ED Disposition   Discharge    Condition   Stable    Date/Time   Wed Sep 21, 2022 11:30 PM    Erasto Lemus discharge to home/self care                 Follow-up Information     Follow up With Specialties Details Why Huber Murillo MD Family Medicine Call  For re-evaluation [address]  [facility] [address]  [phone]            Discharge Medication List as of 9/21/2022 11:34 PM      START taking these medications    Details   predniSONE 20 mg tablet Take 2 5 tablets (50 mg total) by mouth daily for 4 days, Starting Thu 9/22/2022, Until Mon 9/26/2022, Normal         CONTINUE these medications which have NOT CHANGED    Details   albuterol (2 5 mg/3 mL) 0 083 % nebulizer solution Take 3 mL (2 5 mg total) by nebulization every 6 (six) hours as needed for wheezing or shortness of breath, Starting Sun 9/18/2022, Normal      Respiratory Therapy Supplies (Nebulizer) KAMINI Use as needed (Shortness of breath, wheezing), Starting Sun 9/18/2022, Normal      sitaGLIPtin (JANUVIA) 100 mg tablet Take 100 mg by mouth daily, Historical Med      Diclofenac Sodium (VOLTAREN) 1 % Apply 2 g topically 4 (four) times a day, Starting Thu 6/23/2022, Normal      halobetasol (ULTRAVATE) 0 05 % cream Apply topically 2 (two) times a day, Starting Thu 4/21/2022, Normal             No discharge procedures on file      PDMP Review     None          ED Provider  Electronically Signed by           Cosme Orozco MD  09/22/22 9153

## 2022-09-22 NOTE — DISCHARGE INSTRUCTIONS
Please follow all return precautions  Call your primary care doctor if interested in monoclonal antibody therapy  At this time you declined paxlovid treatment, if you change your mind about this please discuss with PCP  Both treatments require that they are started rapidly after diagnosis, so please call as soon as possible  Use home pulse oximetry as we discussed  Thank you

## 2022-09-27 ENCOUNTER — APPOINTMENT (OUTPATIENT)
Dept: PHYSICAL THERAPY | Facility: HOME HEALTHCARE | Age: 42
End: 2022-09-27
Payer: OTHER MISCELLANEOUS

## 2022-09-28 ENCOUNTER — APPOINTMENT (OUTPATIENT)
Dept: PHYSICAL THERAPY | Facility: HOME HEALTHCARE | Age: 42
End: 2022-09-28
Payer: OTHER MISCELLANEOUS

## 2022-10-04 ENCOUNTER — APPOINTMENT (OUTPATIENT)
Dept: PHYSICAL THERAPY | Facility: HOME HEALTHCARE | Age: 42
End: 2022-10-04

## 2022-10-05 ENCOUNTER — OFFICE VISIT (OUTPATIENT)
Dept: PHYSICAL THERAPY | Facility: HOME HEALTHCARE | Age: 42
End: 2022-10-05
Payer: OTHER MISCELLANEOUS

## 2022-10-05 DIAGNOSIS — M77.11 LATERAL EPICONDYLITIS OF RIGHT ELBOW: Primary | ICD-10-CM

## 2022-10-05 PROCEDURE — 97112 NEUROMUSCULAR REEDUCATION: CPT

## 2022-10-05 PROCEDURE — 97110 THERAPEUTIC EXERCISES: CPT

## 2022-10-05 NOTE — PROGRESS NOTES
Daily Note     Today's date: 10/5/2022  Patient name: Greg Mcnamara  : 1980  MRN: [id number]  Referring provider: Mirta Castro PA-C  Dx:   Encounter Diagnosis     ICD-10-CM    1  Lateral epicondylitis of right elbow  M77 11        Start Time: 955  Stop Time: 1030  Total time in clinic (min): 35 minutes    Subjective: Pt reports that her elbow is feeling okay since she has not been to work for the past week  Objective: See treatment diary below      Assessment: Pt demonstrated good tolerance to treatment session and was able to progress with strengthening exercises  Pt would benefit from continued PT  Plan: Continue per plan of care        Precautions: None    Re-eval Date: 10/1/2022      Manuals 8-10 8-16 8/22 8-24 8-31 9/1 9-7 9-14 10/5    R elbow and wrist PROM 8' 10' 8' 8' 8' 8' 8' Declined to home Declined to home    IASTM lateral elbow                                       Neuro Re-Ed 8-10 8-16  8-24 8-31  9-7 9-14     MTP/LTP MTP/LTP  Green  2x10 MTP/LTP  Green   2x10 MTP/LTP  Green   2x10 Green 2x10 ea Green  2x10  Green  2x10  Green  2x10 Green  2x10 Green 2x10    Triceps extension TB or matrix    Red 2x5 Red 2x5 Green 2x5 Red  2x5 Red 2x5 Green 2x5                 Ther Ex -10   8-24 8-31  97 9-14     UBE 10' w/rests 8' w/rests 8' w/ rests 8' w/rests 8' alt  No rests 8' alt Declined 10' ald 8' alt    Wrist flex stretch 3x20" 3x20" 3x20" 3x20 3x20' 3x20" 3x20" 3x20" 3x20"    Wrist flex/ext AROM 1# 2x10 1# 2x10 1# 2x10 1#  2x10 2x10 1# 2x10 1# 2x10 1# 2x10 2# 2x10    Wrist UD/RD AROM             Wrist supination/pronation AROM 1# 2x10 1# 2x10 2# 2x10 2#  2x10   2# 2x10  1# on cane x10 1# on cane 2x10 DB 2#  x8  DB 2#  2x8 DB 2# 2x10    Elbow flex/ext AROM 2# 2x10 2# 2x10 2# 2x10 2#  1x12 2# 2x10 2# 2x10 2# 1x8 2# 2x10 2# 2x10    Eccentric wrist extension 2# 2x10 2# 2x10 2# 2x10 2#  2x10 2#  2x10 2# 2x10 2# 2x10 2# 2x10 2# 2x10    Digiflex Green  x20 Green x20 Green x20 Green  x20 Green  4x5 Green x20 Green  x20 Green x20 Green x20    Therabar             Putty: , roll, pinch  NV Yellow 1' ea Yellow  1' ea Yellow  1' ea Yellow  1' ea Yellow  1' ea Yellow  1' ea Yellow  1' ea    Power web             Ther Activity                                       Gait Training                                       Modalities             CP home home  Home

## 2022-10-12 ENCOUNTER — APPOINTMENT (OUTPATIENT)
Dept: PHYSICAL THERAPY | Facility: HOME HEALTHCARE | Age: 42
End: 2022-10-12

## 2022-10-13 ENCOUNTER — APPOINTMENT (OUTPATIENT)
Dept: PHYSICAL THERAPY | Facility: HOME HEALTHCARE | Age: 42
End: 2022-10-13

## 2022-10-17 ENCOUNTER — OFFICE VISIT (OUTPATIENT)
Dept: PHYSICAL THERAPY | Facility: HOME HEALTHCARE | Age: 42
End: 2022-10-17
Payer: OTHER MISCELLANEOUS

## 2022-10-17 DIAGNOSIS — M77.11 LATERAL EPICONDYLITIS OF RIGHT ELBOW: Primary | ICD-10-CM

## 2022-10-17 PROCEDURE — 97112 NEUROMUSCULAR REEDUCATION: CPT

## 2022-10-17 PROCEDURE — 97110 THERAPEUTIC EXERCISES: CPT

## 2022-10-17 NOTE — PROGRESS NOTES
Daily Note     Today's date: 10/17/2022  Patient name: Alvin Heaton  : 1980  MRN: [id number]  Referring provider: Sukhjinder Houser PA-C  Dx:   Encounter Diagnosis     ICD-10-CM    1  Lateral epicondylitis of right elbow  M77 11                   Subjective: No new changes since LV  Objective: See treatment diary below      Assessment: Tolerated treatment well  Patient is surya to complete all tasks symptom free   fatigue noted with digiflex and putty   Good active ROM noted with DB flexion/ext  Progress as able  Plan: Continue per plan of care         Precautions: None    Re-eval Date: 10/1/2022      Manuals 8-10 8-16 8/22 8-24 8-31 9/1 9-7 9-14 10/5 10/17   R elbow and wrist PROM 8' 10' 8' 8' 8' 8' 8' Declined to home Declined to home declined   IASTM lateral elbow                                       Neuro Re-Ed 8-10 8-16  8-24 8-31  9-7 9-14     MTP/LTP MTP/LTP  Green  2x10 MTP/LTP  Green   2x10 MTP/LTP  Green   2x10 Green 2x10 ea Green  2x10  Green  2x10  Green  2x10 Green  2x10 Green 2x10 Green 2x10   Triceps extension TB or matrix    Red 2x5 Red 2x5 Green 2x5 Red  2x5 Red 2x5 Green 2x5 Green 2x10                Ther Ex 8-10   8-24 8-31  9-7 9-14     UBE 10' w/rests 8' w/rests 8' w/ rests 8' w/rests 8' alt  No rests 8' alt Declined 10' ald 8' alt 8' alt   Wrist flex stretch 3x20" 3x20" 3x20" 3x20 3x20' 3x20" 3x20" 3x20" 3x20" 3x20"   Wrist flex/ext AROM 1# 2x10 1# 2x10 1# 2x10 1#  2x10 2x10 1# 2x10 1# 2x10 1# 2x10 2# 2x10 2# 2x10   Wrist UD/RD AROM             Wrist supination/pronation AROM 1# 2x10 1# 2x10 2# 2x10 2#  2x10   2# 2x10  1# on cane x10 1# on cane 2x10 DB 2#  x8  DB 2#  2x8 DB 2# 2x10 DB 2# 2x10   Elbow flex/ext AROM 2# 2x10 2# 2x10 2# 2x10 2#  1x12 2# 2x10 2# 2x10 2# 1x8 2# 2x10 2# 2x10 2# 2x10   Eccentric wrist extension 2# 2x10 2# 2x10 2# 2x10 2#  2x10 2#  2x10 2# 2x10 2# 2x10 2# 2x10 2# 2x10 2# 2x10   Digiflex Green  x20 Green x20 Green x20 Green  x20 Green  4x5 Green x20 Green  x20 Green x20 Green x20 Green x20   Therabar             Putty: , roll, pinch  NV Yellow 1' ea Yellow  1' ea Yellow  1' ea Yellow  1' ea Yellow  1' ea Yellow  1' ea Yellow  1' ea Yellow  1' ea   Power web             Ther Activity                                       Gait Training                                       Modalities             CP home home  Home

## 2022-10-18 ENCOUNTER — APPOINTMENT (OUTPATIENT)
Dept: PHYSICAL THERAPY | Facility: HOME HEALTHCARE | Age: 42
End: 2022-10-18

## 2022-10-19 NOTE — PROGRESS NOTES
Daily Note     Today's date: 10/20/2022  Patient name: Daryn Alexis  : 1980  MRN: [id number]  Referring provider: Yoanna Angeles PA-C  Dx:   Encounter Diagnosis     ICD-10-CM    1  Lateral epicondylitis of right elbow  M77 11                   Subjective:       Objective: See treatment diary below      Assessment: Tolerated treatment {Tolerated treatment :[id number]}  Patient {assessment:[id number]}      Plan: Continue per plan of care        Precautions: None    Re-eval Date: 10/1/2022      Manuals 10/20 8-16 8/22 8-24 8-31 9/1 9-7 9-14 10/5 10/17   R elbow and wrist PROM Declined 10' 8' 8' 8' 8' 8' Declined to home Declined to home declined   IASTM lateral elbow                                       Neuro Re-Ed       MTP/LTP Green  2x10 ea MTP/LTP  Green   2x10 MTP/LTP  Green   2x10 Green 2x10 ea Green  2x10  Green  2x10  Green  2x10 Green  2x10 Green 2x10 Green 2x10   Triceps extension TB or matrix Green 2x10   Red 2x5 Red 2x5 Green 2x5 Red  2x5 Red 2x5 Green 2x5 Green 2x10                Ther Ex         UBE 8' Alt 8' w/rests 8' w/ rests 8' w/rests 8' alt  No rests 8' alt Declined 10' ald 8' alt 8' alt   Wrist flex stretch 3x20" 3x20" 3x20" 3x20 3x20' 3x20" 3x20" 3x20" 3x20" 3x20"   Wrist flex/ext AROM 2# 2x10 1# 2x10 1# 2x10 1#  2x10 2x10 1# 2x10 1# 2x10 1# 2x10 2# 2x10 2# 2x10   Wrist UD/RD AROM             Wrist supination/pronation AROM DB  2#   2x10 1# 2x10 2# 2x10 2#  2x10   2# 2x10  1# on cane x10 1# on cane 2x10 DB 2#  x8  DB 2#  2x8 DB 2# 2x10 DB 2# 2x10   Elbow flex/ext AROM 2# 2x10 2# 2x10 2# 2x10 2#  1x12 2# 2x10 2# 2x10 2# 1x8 2# 2x10 2# 2x10 2# 2x10   Eccentric wrist extension 2# 2x10 2# 2x10 2# 2x10 2#  2x10 2#  2x10 2# 2x10 2# 2x10 2# 2x10 2# 2x10 2# 2x10   Digiflex Green  x20 Green x20 Green x20 Green  x20 Green  4x5 Green x20 Green  x20 Green x20 Green x20 Green x20   Therabar             Putty: , roll, pinch Yellow  1' ea NV Yellow 1' ea Yellow  1' ea Yellow  1' ea Yellow  1' ea Yellow  1' ea Yellow  1' ea Yellow  1' ea Yellow  1' ea   Power web             Ther Activity                                       Gait Training                                       Modalities             CP home home  Home

## 2022-10-20 ENCOUNTER — APPOINTMENT (OUTPATIENT)
Dept: PHYSICAL THERAPY | Facility: HOME HEALTHCARE | Age: 42
End: 2022-10-20

## 2022-10-26 ENCOUNTER — APPOINTMENT (OUTPATIENT)
Dept: PHYSICAL THERAPY | Facility: HOME HEALTHCARE | Age: 42
End: 2022-10-26

## 2022-10-27 ENCOUNTER — APPOINTMENT (OUTPATIENT)
Dept: PHYSICAL THERAPY | Facility: HOME HEALTHCARE | Age: 42
End: 2022-10-27

## 2022-11-07 ENCOUNTER — TELEPHONE (OUTPATIENT)
Dept: GASTROENTEROLOGY | Facility: CLINIC | Age: 42
End: 2022-11-07

## 2022-11-26 ENCOUNTER — HOSPITAL ENCOUNTER (EMERGENCY)
Facility: HOSPITAL | Age: 42
Discharge: HOME | End: 2022-11-26
Attending: EMERGENCY MEDICINE

## 2022-11-26 ENCOUNTER — APPOINTMENT (EMERGENCY)
Dept: RADIOLOGY | Facility: HOSPITAL | Age: 42
End: 2022-11-26

## 2022-11-26 VITALS
RESPIRATION RATE: 18 BRPM | SYSTOLIC BLOOD PRESSURE: 137 MMHG | DIASTOLIC BLOOD PRESSURE: 78 MMHG | HEART RATE: 78 BPM | OXYGEN SATURATION: 99 % | TEMPERATURE: 98 F

## 2022-11-26 DIAGNOSIS — M25.521 ELBOW PAIN, RIGHT: Primary | ICD-10-CM

## 2022-11-26 NOTE — Clinical Note
Shant Aguilar was seen and treated in our emergency department on 11/25/2022  No work until cleared by Family Doctor/Orthopedics        Diagnosis:     Gabrielle Markham    She may return on this date: If you have any questions or concerns, please don't hesitate to call        Татьяна Rojas MD    ______________________________           _______________          _______________  Hospital Representative                              Date                                Time

## 2022-11-26 NOTE — ED PROVIDER NOTES
History  Chief Complaint   Patient presents with   • Elbow Pain     Patient states hurt her right elbow at work 6 months ago, has been going for therapy and went to a specialist 2 months ago they gave her a shot in her elbow but now it must have worn off bc she is in so much pain     39-year-old right-hand-dominant female with previous history of a lateral epicondylitis to her right elbow  She had a metal bar while at work proximally 6 weeks ago she works as a  she states she had a steroid injection and underwent physical therapy she currently presents with increasing pain just superior to the olecranon process and on the proximal humerus on the lateral aspect  It feels slightly different from her initial injury  She has had no new injuries there is no hand swelling no numbness or tingling she has no shoulder pain but 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 Taking?    Diclofenac Sodium (VOLTAREN) 1 %   No No   Sig: Apply 2 g topically 4 (four) times a day   Lancets (OneTouch Delica Plus [id number]) MISC   Yes No   Respiratory Therapy Supplies (Nebulizer) KAMINI   No No   Sig: Use as needed (Shortness of breath, wheezing)   Patient not taking: Reported on 2022   albuterol (2 5 mg/3 mL) 0 083 % nebulizer solution   No No   Sig: Take 3 mL (2 5 mg total) by nebulization every 6 (six) hours as needed for wheezing or shortness of breath   glimepiride (AMARYL) 2 mg tablet   Yes No   halobetasol (ULTRAVATE) 0 05 % cream   No No   Sig: Apply topically 2 (two) times a day   phentermine (ADIPEX-P) 37 5 MG tablet   Yes No   sitaGLIPtin (JANUVIA) 100 mg tablet   Yes No   Sig: Take 100 mg by mouth daily      Facility-Administered Medications: None       Past Medical History:   Diagnosis Date   • Asthma    • Diabetes mellitus ([facility] [address] )        Past Surgical History:   Procedure Laterality Date   • ADENOIDECTOMY     •  SECTION     • COSMETIC SURGERY      stomach- lipo and fat transfer   • FOOT FRACTURE SURGERY     • TYMPANOSTOMY TUBE PLACEMENT      fell out       Family History   Problem Relation Age of Onset   • Hypertension Mother    • Diabetes Mother    • Diabetes Father    • No Known Problems Half-Brother    • No Known Problems Half-Brother      I have reviewed and agree with the history as documented  E-Cigarette/Vaping   • E-Cigarette Use Never User      E-Cigarette/Vaping Substances   • Nicotine No    • THC No    • CBD No    • Flavoring No    • Other No    • Unknown No      Social History     Tobacco Use   • Smoking status: Every Day     Packs/day: 1 00     Years: 15 00     Pack years: 15 00     Types: Cigarettes   • Smokeless tobacco: Never   Vaping Use   • Vaping Use: Never used   Substance Use Topics   • Alcohol use: Not Currently   • Drug use: Never       Review of Systems   Respiratory: Negative for shortness of breath  Cardiovascular: Negative for chest pain  Musculoskeletal: Positive for arthralgias (Right elbow)  Skin: Negative for rash and wound  Neurological: Negative for weakness and numbness  All other systems reviewed and are negative  Physical Exam  Physical Exam  Vitals and nursing note reviewed  Constitutional:       General: She is not in acute distress  Appearance: She is not ill-appearing, toxic-appearing or diaphoretic  Musculoskeletal:      Right upper arm: Normal       Right elbow: No swelling, deformity, effusion or lacerations  Normal range of motion  Tenderness present in olecranon process (superior)  No radial head, medial epicondyle or lateral epicondyle tenderness  Right forearm: Tenderness present  No swelling, edema, deformity, lacerations or bony tenderness  Right wrist: Normal       Right hand: Normal         Arms:       Comments: 2+ radial pulse    The radial ulnar and median nerves isolated found be intact in my Boyce dermatome to the right upper extremity  Neurological:      General: No focal deficit present  Mental Status: She is alert and oriented to person, place, and time  Cranial Nerves: No cranial nerve deficit  Sensory: No sensory deficit  Motor: No weakness  Coordination: Coordination normal       Gait: Gait normal    Psychiatric:         Mood and Affect: Mood normal          Vital Signs  ED Triage Vitals [11/26/22 0009]   Temperature Pulse Respirations Blood Pressure SpO2   98 °F (36 7 °C) 78 18 137/78 99 %      Temp Source Heart Rate Source Patient Position - Orthostatic VS BP Location FiO2 (%)   Tympanic Monitor Sitting Right arm --      Pain Score       5           Vitals:    11/26/22 0009   BP: 137/78   Pulse: 78   Patient Position - Orthostatic VS: Sitting         Visual Acuity      ED Medications  Medications - No data to display    Diagnostic Studies  Results Reviewed     None                 XR elbow 3+ vw RIGHT   ED Interpretation by Janell Potts MD (11/26 0380)   Read by me; Radiologist to provide formal interpretation  No acute fracture no signif change vs  6/4/22                 Procedures  Procedures         ED Course                                             MDM  Number of Diagnoses or Management Options  Elbow pain, right  Diagnosis management comments: Mdm:  Patient does not have increased tenderness over the lateral epicondyle it is more diffuse  There is no clinical evidence of effusion or erythema or increased warmth    Recommend orthopedic follow-up patient be excused from work until that could be accomplished there is no evidence of fracture recommend RICE therapy      Disposition  Final diagnoses:   Elbow pain, right     Time reflects when diagnosis was documented in both MDM as applicable and the Disposition within this note     Time User Action Codes Description Comment    11/26/2022  2:25 AM Falguni Camilo Add [O11 801] Elbow pain, right       ED Disposition     ED Disposition Discharge    Condition   Stable    Date/Time   Sat Nov 26, 2022  2:25 AM    Comment   Vernondemond Chance discharge to home/self care  Follow-up Information     Follow up With Specialties Details Why Contact Info Additional [address] [facility] [place] [facility] Call in 3 days recheck of right elbow [address] [facility] [address]  [address] [facility] Yonny Sandoval [address] [facility], [place], [place], [address]          Discharge Medication List as of 11/26/2022  2:26 AM      CONTINUE these medications which have NOT CHANGED    Details   albuterol (2 5 mg/3 mL) 0 083 % nebulizer solution Take 3 mL (2 5 mg total) by nebulization every 6 (six) hours as needed for wheezing or shortness of breath, Starting Sun 9/18/2022, Normal      Diclofenac Sodium (VOLTAREN) 1 % Apply 2 g topically 4 (four) times a day, Starting Thu 6/23/2022, Normal      glimepiride (AMARYL) 2 mg tablet Starting Sat 7/30/2022, Historical Med      halobetasol (ULTRAVATE) 0 05 % cream Apply topically 2 (two) times a day, Starting Thu 4/21/2022, Normal      Lancets (OneTouch Delica Plus [id number]) MISC Starting Tue 8/9/2022, Historical Med      phentermine (ADIPEX-P) 37 5 MG tablet Starting Tue 10/18/2022, Historical Med      Respiratory Therapy Supplies (Nebulizer) KAMINI Use as needed (Shortness of breath, wheezing), Starting Sun 9/18/2022, Normal      sitaGLIPtin (JANUVIA) 100 mg tablet Take 100 mg by mouth daily, Historical Med             No discharge procedures on file      PDMP Review     None          ED Provider  Electronically Signed by           Irasema Perla MD  11/26/22 6934

## 2022-11-26 NOTE — Clinical Note
Eduar Watkins was seen and treated in our emergency department on 11/25/2022  No work until cleared by Family Doctor/Orthopedics        Diagnosis:     Claudean Sar    She may return on this date: If you have any questions or concerns, please don't hesitate to call        Daniel Weiner MD    ______________________________           _______________          _______________  Hospital Representative                              Date                                Time

## 2023-01-11 ENCOUNTER — HOSPITAL ENCOUNTER (EMERGENCY)
Facility: HOSPITAL | Age: 43
Discharge: HOME | End: 2023-01-12
Attending: EMERGENCY MEDICINE

## 2023-01-11 ENCOUNTER — APPOINTMENT (EMERGENCY)
Dept: RADIOLOGY | Facility: HOSPITAL | Age: 43
End: 2023-01-11

## 2023-01-11 VITALS
TEMPERATURE: 98.1 F | WEIGHT: 195 LBS | RESPIRATION RATE: 16 BRPM | HEART RATE: 88 BPM | BODY MASS INDEX: 33.47 KG/M2 | OXYGEN SATURATION: 99 % | SYSTOLIC BLOOD PRESSURE: 150 MMHG | DIASTOLIC BLOOD PRESSURE: 94 MMHG

## 2023-01-11 DIAGNOSIS — J34.89 RHINORRHEA: ICD-10-CM

## 2023-01-11 DIAGNOSIS — R05.9 COUGH: Primary | ICD-10-CM

## 2023-01-12 NOTE — ED PROVIDER NOTES
History  Chief Complaint   Patient presents with   • Cold Like Symptoms     Patient presents with cold like symptoms over the last 2 weeks  Patient complaints of a productive cough, runny nose, and sore throat  Denies N/V/D, fever, and chills  77-year-old female with a past medical history of asthma, diabetes, who presents for productive cough, runny nose, sore throat  Patient denies any fevers or chills  She does describe occasional headaches  He states that the symptoms are going on for approximately 2 weeks  States in the past week she feels like its been getting worse  Describes productive sputum, "phlegm color"  No blood  No fevers/chills  ROS otherwise negative  Prior to Admission Medications   Prescriptions Last Dose Informant Patient Reported? Taking?    Diclofenac Sodium (VOLTAREN) 1 %   No No   Sig: Apply 2 g topically 4 (four) times a day   Lancets (OneTouch Delica Plus [id number]) MISC   Yes No   Respiratory Therapy Supplies (Nebulizer) KAMINI   No No   Sig: Use as needed (Shortness of breath, wheezing)   Patient not taking: Reported on 2022   albuterol (2 5 mg/3 mL) 0 083 % nebulizer solution   No No   Sig: Take 3 mL (2 5 mg total) by nebulization every 6 (six) hours as needed for wheezing or shortness of breath   glimepiride (AMARYL) 2 mg tablet   Yes No   halobetasol (ULTRAVATE) 0 05 % cream   No No   Sig: Apply topically 2 (two) times a day   phentermine (ADIPEX-P) 37 5 MG tablet   Yes No   sitaGLIPtin (JANUVIA) 100 mg tablet   Yes No   Sig: Take 100 mg by mouth daily      Facility-Administered Medications: None       Past Medical History:   Diagnosis Date   • Asthma    • Diabetes mellitus ([facility] [address] )        Past Surgical History:   Procedure Laterality Date   • ADENOIDECTOMY     •  SECTION     • COSMETIC SURGERY      stomach- lipo and fat transfer   • FOOT FRACTURE SURGERY     • TYMPANOSTOMY TUBE PLACEMENT      fell out       Family History   Problem Relation Age of Onset   • Hypertension Mother    • Diabetes Mother    • Diabetes Father    • No Known Problems Half-Brother    • No Known Problems Half-Brother      I have reviewed and agree with the history as documented  E-Cigarette/Vaping   • E-Cigarette Use Never User      E-Cigarette/Vaping Substances   • Nicotine No    • THC No    • CBD No    • Flavoring No    • Other No    • Unknown No      Social History     Tobacco Use   • Smoking status: Every Day     Packs/day: 1 00     Years: 15 00     Pack years: 15 00     Types: Cigarettes   • Smokeless tobacco: Never   Vaping Use   • Vaping Use: Never used   Substance Use Topics   • Alcohol use: Not Currently   • Drug use: Never       Review of Systems   Constitutional: Negative for chills and fever  HENT: Positive for congestion, rhinorrhea and sore throat  Respiratory: Positive for cough  Negative for shortness of breath  Cardiovascular: Negative for chest pain and palpitations  Gastrointestinal: Negative for abdominal pain, constipation, diarrhea, nausea and vomiting  Genitourinary: Negative for difficulty urinating and flank pain  Musculoskeletal: Negative for arthralgias  Neurological: Negative for dizziness, weakness, light-headedness and headaches  Psychiatric/Behavioral: Negative for agitation, behavioral problems and confusion  All other systems reviewed and are negative  Physical Exam  Physical Exam  Constitutional:       Appearance: She is well-developed  HENT:      Head: Normocephalic and atraumatic  Right Ear: Tympanic membrane normal       Left Ear: Tympanic membrane normal       Nose: Nose normal       Mouth/Throat:      Mouth: Mucous membranes are moist       Pharynx: No oropharyngeal exudate or posterior oropharyngeal erythema  Comments: Coughing in the room  Moist mucous membranes  Oropharynx is unremarkable, no tonsillar erythema/exudate  Cardiovascular:      Rate and Rhythm: Normal rate and regular rhythm        Heart sounds: Normal heart sounds  No murmur heard  No friction rub  Pulmonary:      Effort: Pulmonary effort is normal  No respiratory distress  Breath sounds: Normal breath sounds  No wheezing or rales  Comments: No wheezing, no respiratory distress  Abdominal:      General: Bowel sounds are normal  There is no distension  Palpations: Abdomen is soft  Tenderness: There is no abdominal tenderness  Musculoskeletal:         General: Normal range of motion  Cervical back: Normal range of motion and neck supple  Skin:     General: Skin is warm  Neurological:      Mental Status: She is alert and oriented to person, place, and time  Coordination: Coordination normal    Psychiatric:         Behavior: Behavior normal          Thought Content: Thought content normal          Judgment: Judgment normal          Vital Signs  ED Triage Vitals [01/11/23 2316]   Temperature Pulse Respirations Blood Pressure SpO2   98 1 °F (36 7 °C) 88 16 150/94 99 %      Temp Source Heart Rate Source Patient Position - Orthostatic VS BP Location FiO2 (%)   Tympanic Monitor Sitting Right arm --      Pain Score       4           Vitals:    01/11/23 2316   BP: 150/94   Pulse: 88   Patient Position - Orthostatic VS: Sitting         Visual Acuity      ED Medications  Medications - No data to display    Diagnostic Studies  Results Reviewed     Procedure Component Value Units Date/Time    COVID/FLU/RSV [[id number]]  (Normal) Collected: 01/11/23 2328    Lab Status: Final result Specimen: Nares from Nose Updated: 01/12/23 0023     SARS-CoV-2 Negative     INFLUENZA A PCR Negative     INFLUENZA B PCR Negative     RSV PCR Negative    Narrative:      FOR PEDIATRIC PATIENTS - copy/paste COVID Guidelines URL to browser: [email or web address]  [email or web address]    SARS-CoV-2 assay is a Nucleic Acid Amplification assay intended for the  qualitative detection of nucleic acid from SARS-CoV-2 in nasopharyngeal  swabs  Results are for the presumptive identification of SARS-CoV-2 RNA  Positive results are indicative of infection with SARS-CoV-2, the virus  causing COVID-19, but do not rule out bacterial infection or co-infection  with other viruses  Laboratories within the [place] and its  territories are required to report all positive results to the appropriate  public health authorities  Negative results do not preclude SARS-CoV-2  infection and should not be used as the sole basis for treatment or other  patient management decisions  Negative results must be combined with  clinical observations, patient history, and epidemiological information  This test has not been FDA cleared or approved  This test has been authorized by FDA under an Emergency Use Authorization  (EUA)  This test is only authorized for the duration of time the  declaration that circumstances exist justifying the authorization of the  emergency use of an in vitro diagnostic tests for detection of SARS-CoV-2  virus and/or diagnosis of COVID-19 infection under section 564(b)(1) of  the Act, 21 U  S C  064LVM-9(X)(0), unless the authorization is terminated  or revoked sooner  The test has been validated but independent review by FDA  and CLIA is pending  Test performed using [employer] GeneXpert: This RT-PCR assay targets N2,  a region unique to SARS-CoV-2  A conserved region in the E-gene was chosen  for pan-Sarbecovirus detection which includes SARS-CoV-2  According to CMS-2020-01-R, this platform meets the definition of high-throughput technology  XR chest 1 view portable   ED Interpretation by Bud Roman MD (01/11 0273)   No acute cardiopulmonary disease  Final Result by Jimena Peña MD (01/12 4582)      No acute cardiopulmonary disease                    Workstation performed: [id number]                    Procedures  Procedures         ED Course Medical Decision Making  Patient's presentation is most consistent with viral syndrome  Given timeline of symptoms, and her description of worsening symptoms, in the setting of diabetes history, I considered pneumonia, although less likely, will perform chest x-ray to evaluate for pneumonia  Patient otherwise is not SIRS positive, satting 99%, and otherwise well-appearing  Will also viral swab at this time given URI sx  Will discharge with strict return precautions, recommend PCP follow up  Cough: acute illness or injury  Rhinorrhea: acute illness or injury  Amount and/or Complexity of Data Reviewed  Radiology: ordered and independent interpretation performed  Disposition  Final diagnoses:   Cough   Rhinorrhea     Time reflects when diagnosis was documented in both MDM as applicable and the Disposition within this note     Time User Action Codes Description Comment    1/11/2023 11:49 PM Marshal Pulse Add [R05 9] Cough     1/11/2023 11:49 PM [address] [facility], [address] [place] [address] [J34 89] Rhinorrhea       ED Disposition     ED Disposition   Discharge    Condition   Stable    Date/Time   Wed Jan 11, 2023 11:49 PM    [address] Lemus discharge to home/self care  Follow-up Information     Follow up With Specialties Details Why Brenna Hensley MD Family Medicine Call  For re-evaluation [address] [facility] [address]  [phone]            Discharge Medication List as of 1/11/2023 11:50 PM      CONTINUE these medications which have NOT CHANGED    Details   albuterol (2 5 mg/3 mL) 0 083 % nebulizer solution Take 3 mL (2 5 mg total) by nebulization every 6 (six) hours as needed for wheezing or shortness of breath, Starting Sun 9/18/2022, Normal      Diclofenac Sodium (VOLTAREN) 1 % Apply 2 g topically 4 (four) times a day, Starting Thu 6/23/2022, Normal      glimepiride (AMARYL) 2 mg tablet Starting Sat 7/30/2022, Historical Med halobetasol (ULTRAVATE) 0 05 % cream Apply topically 2 (two) times a day, Starting u 4/21/2022, Normal      Lancets (OneTouch Delica Plus [id number]) MISC Starting Tue 8/9/2022, Historical Med      phentermine (ADIPEX-P) 37 5 MG tablet Starting Tue 10/18/2022, Historical Med      Respiratory Therapy Supplies (Nebulizer) KAMINI Use as needed (Shortness of breath, wheezing), Starting Sun 9/18/2022, Normal      sitaGLIPtin (JANUVIA) 100 mg tablet Take 100 mg by mouth daily, Historical Med             No discharge procedures on file      PDMP Review     None          ED Provider  Electronically Signed by           Beverly Avlia MD  01/12/23 7847

## 2023-01-12 NOTE — DISCHARGE INSTRUCTIONS
Your chest x-ray at this time is not demonstrating pneumonia  Please follow up with your primary care doctor for further care  Please follow up on viral testing  Thank you

## 2023-01-23 ENCOUNTER — OFFICE VISIT (OUTPATIENT)
Dept: OBGYN CLINIC | Facility: OTHER | Age: 43
End: 2023-01-23

## 2023-01-23 VITALS
HEART RATE: 81 BPM | SYSTOLIC BLOOD PRESSURE: 126 MMHG | HEIGHT: 64 IN | WEIGHT: 201 LBS | DIASTOLIC BLOOD PRESSURE: 79 MMHG | BODY MASS INDEX: 34.31 KG/M2

## 2023-01-23 DIAGNOSIS — R20.2 RIGHT HAND PARESTHESIA: Primary | ICD-10-CM

## 2023-01-23 DIAGNOSIS — M77.11 LATERAL EPICONDYLITIS OF RIGHT ELBOW: ICD-10-CM

## 2023-01-23 RX ORDER — SEMAGLUTIDE 1.34 MG/ML
INJECTION, SOLUTION SUBCUTANEOUS
COMMUNITY
Start: 2023-01-20

## 2023-01-23 RX ORDER — BLOOD-GLUCOSE SENSOR
EACH MISCELLANEOUS
COMMUNITY
Start: 2023-01-05

## 2023-01-23 RX ORDER — LIDOCAINE 50 MG/G
1 PATCH TOPICAL DAILY
Qty: 30 PATCH | Refills: 2 | Status: SHIPPED | OUTPATIENT
Start: 2023-01-23

## 2023-01-23 RX ORDER — PEN NEEDLE, DIABETIC 32 GX 1/6"
NEEDLE, DISPOSABLE MISCELLANEOUS
COMMUNITY
Start: 2022-12-14
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
## 2023-04-05 ENCOUNTER — HOSPITAL ENCOUNTER (OUTPATIENT)
Dept: MAMMOGRAPHY | Facility: HOSPITAL | Age: 43
Discharge: HOME | End: 2023-04-05

## 2023-04-05 VITALS — BODY MASS INDEX: 34.31 KG/M2 | HEIGHT: 64 IN | WEIGHT: 201 LBS

## 2023-04-05 DIAGNOSIS — Z12.31 ENCOUNTER FOR SCREENING MAMMOGRAM FOR BREAST CANCER: ICD-10-CM

## 2023-04-19 ENCOUNTER — APPOINTMENT (OUTPATIENT)
Dept: LAB | Facility: MEDICAL CENTER | Age: 43
End: 2023-04-19

## 2023-04-19 DIAGNOSIS — Z11.4 SCREENING FOR HIV (HUMAN IMMUNODEFICIENCY VIRUS): ICD-10-CM

## 2023-04-19 DIAGNOSIS — Z11.59 NEED FOR HEPATITIS C SCREENING TEST: ICD-10-CM

## 2023-04-19 DIAGNOSIS — R25.2 MUSCLE CRAMPS: ICD-10-CM

## 2023-04-19 DIAGNOSIS — E11.9 TYPE 2 DIABETES MELLITUS WITHOUT COMPLICATION, WITHOUT LONG-TERM CURRENT USE OF INSULIN (HCC): ICD-10-CM

## 2023-04-19 LAB
ANION GAP SERPL CALCULATED.3IONS-SCNC: 4 MMOL/L (ref 4–13)
BUN SERPL-MCNC: 15 MG/DL (ref 5–25)
CALCIUM SERPL-MCNC: 10.1 MG/DL (ref 8.3–10.1)
CHLORIDE SERPL-SCNC: 106 MMOL/L (ref 96–108)
CO2 SERPL-SCNC: 24 MMOL/L (ref 21–32)
CREAT SERPL-MCNC: 0.96 MG/DL (ref 0.6–1.3)
GFR SERPL CREATININE-BSD FRML MDRD: 72 ML/MIN/1.73SQ M
GLUCOSE SERPL-MCNC: 193 MG/DL (ref 65–140)
MAGNESIUM SERPL-MCNC: 2.1 MG/DL (ref 1.6–2.6)
POTASSIUM SERPL-SCNC: 4 MMOL/L (ref 3.5–5.3)
SODIUM SERPL-SCNC: 134 MMOL/L (ref 135–147)

## 2023-04-20 LAB
HCV AB SER QL: NORMAL
HIV 1+2 AB+HIV1 P24 AG SERPL QL IA: NORMAL
HIV 2 AB SERPL QL IA: NORMAL
HIV1 AB SERPL QL IA: NORMAL
HIV1 P24 AG SERPL QL IA: NORMAL

## 2023-04-21 ENCOUNTER — TELEPHONE (OUTPATIENT)
Dept: FAMILY MEDICINE CLINIC | Facility: CLINIC | Age: 43
End: 2023-04-21

## 2023-04-21 LAB
CREAT UR-MCNC: 159 MG/DL
EST. AVERAGE GLUCOSE BLD GHB EST-MCNC: 192 MG/DL
HBA1C MFR BLD: 8.3 %
MICROALBUMIN UR-MCNC: 14 MG/L (ref 0–20)
MICROALBUMIN/CREAT 24H UR: 9 MG/G CREATININE (ref 0–30)

## 2023-04-21 NOTE — TELEPHONE ENCOUNTER
Spoke with pt and pt is requesting a call from you directly as soon as possible  Pt is starting a new job on 4/24 and does not know her new schedule yet to make an appointment  Pt is worried about having to book out into May for our next opening to get in due to needing diabetic medication adjusted  It was explained to pt that she would need to be in office to discuss this issue but was requesting a call from you

## 2023-05-02 ENCOUNTER — HOSPITAL ENCOUNTER (OUTPATIENT)
Dept: ULTRASOUND IMAGING | Facility: CLINIC | Age: 43
Discharge: HOME | End: 2023-05-02

## 2023-05-02 ENCOUNTER — TELEPHONE (OUTPATIENT)
Dept: FAMILY MEDICINE CLINIC | Facility: CLINIC | Age: 43
End: 2023-05-02

## 2023-05-02 ENCOUNTER — HOSPITAL ENCOUNTER (OUTPATIENT)
Dept: MAMMOGRAPHY | Facility: CLINIC | Age: 43
Discharge: HOME | End: 2023-05-02

## 2023-05-02 DIAGNOSIS — R92.8 ABNORMAL MAMMOGRAM: ICD-10-CM

## 2023-05-17 NOTE — TELEPHONE ENCOUNTER
Patient called for update on her work note  Relayed the message that one was put in her chart  She will be calling back with a fax number  Retinoid Dermatitis Aggressive Treatment: I recommended more frequent application of Cetaphil or CeraVe to the areas of dermatitis. I also prescribed a topical steroid for twice daily use until the dermatitis resolves.

## 2023-07-27 ENCOUNTER — TELEPHONE (OUTPATIENT)
Dept: OBGYN CLINIC | Facility: HOSPITAL | Age: 43
End: 2023-07-27

## 2023-07-27 NOTE — TELEPHONE ENCOUNTER
Caller: Patient    Doctor: Dr. Kelly Jamison    Reason for call: Patient calling to see if an order for an [address] can be placed in Epic for right wrist for possible CTS. Patient asking for call back when order is placed.     Call back#: [phone]

## 2023-07-31 NOTE — TELEPHONE ENCOUNTER
At the last office visit a diagnostic wrist ultrasound was ordered for the patient to be performed by radiology for possible carpal tunnel syndrome. Recommend that the patient should schedule this wrist ultrasound with radiology and then have subsequent office follow-up.

## 2023-08-01 NOTE — TELEPHONE ENCOUNTER
Called patient @ # on file  No answer  LVM relating Dr. Indira Mceknna message  Asked patient to call back with any questions and concerns

## 2023-08-09 ENCOUNTER — OFFICE VISIT (OUTPATIENT)
Dept: FAMILY MEDICINE CLINIC | Facility: CLINIC | Age: 43
End: 2023-08-09

## 2023-08-09 ENCOUNTER — TELEPHONE (OUTPATIENT)
Dept: FAMILY MEDICINE CLINIC | Facility: CLINIC | Age: 43
End: 2023-08-09

## 2023-08-09 VITALS
DIASTOLIC BLOOD PRESSURE: 68 MMHG | OXYGEN SATURATION: 99 % | BODY MASS INDEX: 33.12 KG/M2 | TEMPERATURE: 96.5 F | WEIGHT: 194 LBS | HEART RATE: 85 BPM | HEIGHT: 64 IN | SYSTOLIC BLOOD PRESSURE: 118 MMHG

## 2023-08-09 DIAGNOSIS — M77.11 LATERAL EPICONDYLITIS OF RIGHT ELBOW: ICD-10-CM

## 2023-08-09 DIAGNOSIS — E11.9 TYPE 2 DIABETES MELLITUS WITHOUT COMPLICATION, WITHOUT LONG-TERM CURRENT USE OF INSULIN (HCC): Primary | ICD-10-CM

## 2023-08-09 LAB — SL AMB POCT HEMOGLOBIN AIC: 8 (ref ?–6.5)

## 2023-08-09 RX ORDER — GLIMEPIRIDE 2 MG/1
2 TABLET ORAL
Qty: 60 TABLET | Refills: 0 | Status: SHIPPED | OUTPATIENT
Start: 2023-08-09

## 2023-08-09 RX ORDER — PHENTERMINE HYDROCHLORIDE 37.5 MG/1
TABLET ORAL
Status: CANCELLED | OUTPATIENT
Start: 2023-08-09

## 2023-08-09 RX ORDER — BLOOD SUGAR DIAGNOSTIC
STRIP MISCELLANEOUS
Qty: 100 EACH | Refills: 3 | Status: SHIPPED | OUTPATIENT
Start: 2023-08-09

## 2023-08-09 RX ORDER — SEMAGLUTIDE 1.34 MG/ML
1 INJECTION, SOLUTION SUBCUTANEOUS
Qty: 3 ML | Refills: 0 | Status: SHIPPED | OUTPATIENT
Start: 2023-08-09

## 2023-08-09 RX ORDER — SEMAGLUTIDE 1.34 MG/ML
INJECTION, SOLUTION SUBCUTANEOUS
COMMUNITY
Start: 2023-05-02 | End: 2023-08-09 | Stop reason: SDUPTHER

## 2023-08-09 RX ORDER — LANCETS 33 GAUGE
EACH MISCELLANEOUS
Qty: 100 EACH | Refills: 3 | Status: SHIPPED | OUTPATIENT
Start: 2023-08-09

## 2023-08-09 RX ORDER — LIDOCAINE 50 MG/G
1 PATCH TOPICAL DAILY
Qty: 30 PATCH | Refills: 2 | Status: SHIPPED | OUTPATIENT
Start: 2023-08-09

## 2023-08-09 RX ORDER — BLOOD-GLUCOSE METER
KIT MISCELLANEOUS
Qty: 1 KIT | Refills: 0 | Status: SHIPPED | OUTPATIENT
Start: 2023-08-09

## 2023-08-09 NOTE — PROGRESS NOTES
Assessment/Plan     Diagnoses and all orders for this visit:    Type 2 diabetes mellitus without complication, without long-term current use of insulin ([facility])  Comments:  -HbA1c down to 8 from 8.3  -Patient reports not taking her DM meds last 1 month due to no refills  -Patient requests refills for all her medications  -Advised patient to continue Ozempic at 1 mg per dose for next 4 weeks with the plan of increasing it to 1.7 mg/dose after for DM and weight loss- depending on how she tolerates  -Advised patient to take glimepiride with breakfast to avoid hypoglycemic events, advised patient to keep BG log and bring it next visit for review  -If patient continues to have hypoglycemic events with glimepiride, will consider switching to another medication.        -DisContinue Januvia 100 mg       -Ref placed to ophthalmology for DM eye checkup        -Recheck A1c in 3 months, RTC in 3 months for annual physical and DM check       -DM supplies ordered        Orders:  -     glimepiride (AMARYL) 2 mg tablet; Take 1 tablet (2 mg total) by mouth daily with breakfast  -     Ozempic, 1 MG/DOSE, 4 MG/3ML injection pen; Inject 0.75 mL (1 mg total) under the skin every 7 days  -     HEMOGLOBIN A1C W/ EAG ESTIMATION; Future  -     Blood Glucose Monitoring Suppl (OneTouch Verio Reflect) w/Device KIT; Check blood sugars once daily. Please substitute with appropriate alternative as covered by patient's insurance. Dx: E11.65  -     glucose blood (OneTouch Verio) test strip; Check blood sugars once daily. Please substitute with appropriate alternative as covered by patient's insurance. Dx: E11.65  -     OneTouch Delica Lancets 41U MISC; Check blood sugars once daily. Please substitute with appropriate alternative as covered by patient's insurance. Dx: E11.65  -     POCT hemoglobin A1c  -     Ambulatory Referral to Ophthalmology;  Future    BMI 33.0-33.9,adult  Comments:  BMI > 33  Counseled on healthy diet and exercise  On Ozempic for weight loss and DM management  D/C phentermine as patient is on Ozempic  Orders:  -     Lipid panel; Future  -     TSH, 3rd generation; Future  -     T4, free; Future    Lateral epicondylitis of right elbow  -     lidocaine (Lidoderm) 5 %; Apply 1 patch topically over 12 hours daily Remove & Discard patch within 12 hours or as directed by MD    Other orders  -     Discontinue: Ozempic, 1 MG/DOSE, 4 MG/3ML injection pen; 1 MG SUBCUTANEOUSLY WEEKLY    Return to clinic in a few weeks for Pap smear    Subjective     Patient Id: Marina Lemon is a 38 y.o. female    Is a 77-year-old female with a PMH of type II DM who presents to the office today for refill requests. Patient states that she has been out of all her medications since 1 month. Patient denies any acute symptoms this visit. Patient states that her A1c was initially 10 and improved with adding Ozempic. Patient also reports few hypoglycemic events with glimepiride, although she was unaware that she had to take breakfast with it. Patient denies any other concerns this visit. Review of Systems   Constitutional: Negative for chills and fever. HENT: Negative for ear pain and sore throat. Eyes: Negative for pain and visual disturbance. Respiratory: Negative for cough and shortness of breath. Cardiovascular: Negative for chest pain and palpitations. Gastrointestinal: Negative for abdominal pain and vomiting. Genitourinary: Negative for dysuria and hematuria. Musculoskeletal: Negative for arthralgias and back pain. Skin: Negative for color change and rash. Neurological: Negative for seizures and syncope. All other systems reviewed and are negative.       Past Medical History:   Diagnosis Date   • Asthma    • Diabetes mellitus ([address])        Past Surgical History:   Procedure Laterality Date   • ADENOIDECTOMY     •  SECTION     • COSMETIC SURGERY      stomach- lipo and fat transfer   • FOOT FRACTURE SURGERY     • TYMPANOSTOMY TUBE PLACEMENT      fell out       Family History   Problem Relation Age of Onset   • Hypertension Mother    • Diabetes Mother    • Stomach cancer Father    • Colon cancer Father    • Diabetes Father    • Lung cancer Maternal Grandmother    • No Known Problems Maternal Grandfather    • No Known Problems Paternal Grandmother    • No Known Problems Paternal Grandfather    • No Known Problems Half-Brother    • No Known Problems Half-Brother    • Stomach cancer Cousin    • Hypertension Maternal Aunt    • Diabetes Maternal Aunt    • Hypertension Maternal Aunt    • No Known Problems Paternal Aunt    • Diabetes Paternal Aunt    • No Known Problems Paternal Aunt    • No Known Problems Paternal Aunt        Social History     Socioeconomic History   • Marital status: Single     Spouse name: None   • Number of children: None   • Years of education: None   • Highest education level: GED or equivalent   Occupational History     Comment:    Tobacco Use   • Smoking status: Every Day     Packs/day: 1.00     Years: 15.00     Total pack years: 15.00     Types: Cigarettes   • Smokeless tobacco: Never   Vaping Use   • Vaping Use: Never used   Substance and Sexual Activity   • Alcohol use: Not Currently   • Drug use: Never   • Sexual activity: Yes     Partners: Male   Other Topics Concern   • None   Social History Narrative    Lives in house with  and 2 kids     Social Determinants of Health     Financial Resource Strain: Not on file   Food Insecurity: Not on file   Transportation Needs: Not on file   Physical Activity: Not on file   Stress: Not on file   Social Connections: Not on file   Intimate Partner Violence: Not on file   Housing Stability: Not on file       No Known Allergies      Current Outpatient Medications:   •  Blood Glucose Monitoring Suppl (OneTouch Verio Reflect) w/Device KIT, Check blood sugars once daily. Please substitute with appropriate alternative as covered by patient's insurance.  Dx: E11.65, Disp: 1 kit, Rfl: 0  •  Diclofenac Sodium (VOLTAREN) 1 %, Apply 2 g topically 4 (four) times a day, Disp: 100 g, Rfl: 1  •  glimepiride (AMARYL) 2 mg tablet, Take 1 tablet (2 mg total) by mouth daily with breakfast, Disp: 60 tablet, Rfl: 0  •  glucose blood (OneTouch Verio) test strip, Check blood sugars once daily. Please substitute with appropriate alternative as covered by patient's insurance. Dx: E11.65, Disp: 100 each, Rfl: 3  •  halobetasol (ULTRAVATE) 0.05 % cream, Apply topically 2 (two) times a day, Disp: 50 g, Rfl: 0  •  lidocaine (Lidoderm) 5 %, Apply 1 patch topically over 12 hours daily Remove & Discard patch within 12 hours or as directed by MD, Disp: 30 patch, Rfl: 2  •  NovoFine Plus Pen Needle 32G X 4 MM MISC, USE TO INJECT MEDICATION UNDER THE SKIN ONCE WEEKLY FOR 30 DAYS, Disp: , Rfl:   •  OneTouch Delica Lancets 19A MISC, Check blood sugars once daily. Please substitute with appropriate alternative as covered by patient's insurance. Dx: E11.65, Disp: 100 each, Rfl: 3  •  Ozempic, 1 MG/DOSE, 4 MG/3ML injection pen, Inject 0.75 mL (1 mg total) under the skin every 7 days, Disp: 3 mL, Rfl: 0  •  albuterol (2.5 mg/3 mL) 0.083 % nebulizer solution, Take 3 mL (2.5 mg total) by nebulization every 6 (six) hours as needed for wheezing or shortness of breath (Patient not taking: Reported on 8/9/2023), Disp: 75 mL, Rfl: 0  •  Continuous Blood Gluc Sensor (FreeStyle Richelle 3 Sensor) MISC, CHANGE SENSOR EVERY 14 DAYS (Patient not taking: Reported on 8/9/2023), Disp: , Rfl:   •  Respiratory Therapy Supplies (Nebulizer) KAMINI, Use as needed (Shortness of breath, wheezing) (Patient not taking: Reported on 9/21/2022), Disp: 1 each, Rfl: 0    Objective     Vitals:    08/09/23 1002   BP: 118/68   BP Location: Left arm   Patient Position: Sitting   Pulse: 85   Temp: (!) 96.5 °F (35.8 °C)   TempSrc: Tympanic   SpO2: 99%   Weight: 88 kg (194 lb)   Height: 5' 4" (1.626 m)       Physical Exam  Vitals and nursing note reviewed. Constitutional:       General: She is not in acute distress. Appearance: She is well-developed. HENT:      Head: Normocephalic and atraumatic. Eyes:      Conjunctiva/sclera: Conjunctivae normal.   Cardiovascular:      Rate and Rhythm: Normal rate and regular rhythm. Heart sounds: No murmur heard. Pulmonary:      Effort: Pulmonary effort is normal. No respiratory distress. Breath sounds: Normal breath sounds. Abdominal:      Palpations: Abdomen is soft. Tenderness: There is no abdominal tenderness. Musculoskeletal:         General: No swelling. Cervical back: Neck supple. Skin:     General: Skin is warm and dry. Capillary Refill: Capillary refill takes less than 2 seconds. Neurological:      Mental Status: She is alert.    Psychiatric:         Mood and Affect: Mood normal.         Lauren Pimentel MD  Family Medicine

## 2023-08-09 NOTE — TELEPHONE ENCOUNTER
Olga Hernandez called and said the new diabetic medication that you had prescribed for her needs a prior authorization. Could you please look into this. The patient will also give the office a call in the morning. Thank you!

## 2023-08-10 NOTE — TELEPHONE ENCOUNTER
Patient called requesting the status on the prior auth I informed her that we still have not received the form from the pharmacy. She states she is still without medication for the diabetes and wants to know what you want her to do?

## 2023-08-10 NOTE — TELEPHONE ENCOUNTER
Called the pharmacy, they state there is no form that they send over, just a fax stating it requires prior authorization and that the lidocaine, and [place] and [place] require a prior authorization as well. Please let me know if there is any way I can assist with this.

## 2023-08-10 NOTE — TELEPHONE ENCOUNTER
I was trying to complete a prior auth for this patient but when trying to submit the the plan the website would state "Cannot find matching patient due to system outage. Please try again later " so I will try again tomorrow morning to see if they have resolved the problem with the website.

## 2023-08-11 ENCOUNTER — TELEPHONE (OUTPATIENT)
Dept: FAMILY MEDICINE CLINIC | Facility: CLINIC | Age: 43
End: 2023-08-11

## 2023-08-11 NOTE — TELEPHONE ENCOUNTER
It takes about a week or so. And we have been having trouble with the website for the prior auth I am going to try to again now then if I still have the same issue I will call the insurance and try to do it over the phone. Quality 130: Documentation Of Current Medications In The Medical Record: Current Medications Documented Detail Level: Detailed Quality 431: Preventive Care And Screening: Unhealthy Alcohol Use - Screening: Patient not identified as an unhealthy alcohol user when screened for unhealthy alcohol use using a systematic screening method Quality 110: Preventive Care And Screening: Influenza Immunization: Influenza Immunization previously received during influenza season Quality 226: Preventive Care And Screening: Tobacco Use: Screening And Cessation Intervention: Patient screened for tobacco use and is an ex/non-smoker

## 2023-08-11 NOTE — PROGRESS NOTES
Januvia discontinued as patient is on already on GLP-1. Discussed with Dr. Kandice Yeager over [employer] who agrees with the plan.

## 2023-08-11 NOTE — TELEPHONE ENCOUNTER
Left voicemail informing patient that her prior auth for DM meds is still in process. Advised patient to call back to discuss DM meds options.

## 2023-08-11 NOTE — TELEPHONE ENCOUNTER
Patient called back stating she didn't know it was you calling. She is aware you are going to be calling now and is requesting another call back please. She is available at any time.

## 2023-08-11 NOTE — TELEPHONE ENCOUNTER
Spoke to patient over the phone and informed her that we would be discontinuing Januvia. Advised patient to continue taking glimepiride with breakfast. Informed patient that her prior Cindra Roger is in the process and it may take about a week. Advised patient to keep a fasting BG log.

## 2023-08-14 ENCOUNTER — TELEPHONE (OUTPATIENT)
Dept: FAMILY MEDICINE CLINIC | Facility: CLINIC | Age: 43
End: 2023-08-14

## 2023-08-24 ENCOUNTER — RA CDI HCC (OUTPATIENT)
Dept: OTHER | Facility: HOSPITAL | Age: 43
End: 2023-08-24

## 2023-08-24 NOTE — PROGRESS NOTES
[facility] [address] [facility] coding opportunities          Chart Reviewed number of suggestions sent to Provider: 1   e11.65    Patients Insurance        Commercial Insurance: [employer]

## 2023-09-05 ENCOUNTER — ANNUAL EXAM (OUTPATIENT)
Dept: FAMILY MEDICINE CLINIC | Facility: CLINIC | Age: 43
End: 2023-09-05

## 2023-09-05 VITALS
DIASTOLIC BLOOD PRESSURE: 72 MMHG | BODY MASS INDEX: 32.78 KG/M2 | OXYGEN SATURATION: 99 % | HEIGHT: 64 IN | TEMPERATURE: 98.5 F | HEART RATE: 77 BPM | SYSTOLIC BLOOD PRESSURE: 126 MMHG | WEIGHT: 192 LBS

## 2023-09-05 DIAGNOSIS — N92.0 MENORRHAGIA WITH REGULAR CYCLE: ICD-10-CM

## 2023-09-05 DIAGNOSIS — Z12.4 SCREENING FOR CERVICAL CANCER: Primary | ICD-10-CM

## 2023-09-05 DIAGNOSIS — E11.9 TYPE 2 DIABETES MELLITUS WITHOUT COMPLICATION, WITHOUT LONG-TERM CURRENT USE OF INSULIN (HCC): ICD-10-CM

## 2023-09-05 PROCEDURE — G0143 SCR C/V CYTO,THINLAYER,RESCR: HCPCS

## 2023-09-05 NOTE — PROGRESS NOTES
Assessment/Plan     Diagnoses and all orders for this visit:    Screening for cervical cancer  -     Liquid-based pap, screening ([facility]); Future  -     Liquid-based pap, screening ([facility])    Menorrhagia with regular cycle  Comments:  Patient reports 7 days of periods, heavy periods first few days  Changes 6-7 pads/day  Requests referral to OB/GYN  Orders:  -     Ambulatory Referral to Obstetrics / Gynecology; Future    Type 2 diabetes mellitus without complication, without long-term current use of insulin (HCC)  Comments:  Increased Ozempic dose from 1 mg to 2 mg weekly  Return to clinic in 2 months for DM check    Orders:  -     semaglutide, 2 mg/dose, (Ozempic) 8 mg/ mL injection pen; Inject 0.75 mL (2 mg total) under the skin every 7 days        Subjective     Patient Id: Steve Mosley is a 37 y.o. female    Patient is a 57-year-old female who presents to the office today for cervical cancer screening. Patient denies any current concerns. Patient requests referral to OB/Gyn for concerns for heavy menstrual bleeding. States that she changes about 6-7 pads per day. Reports that her menstrual cycle is regular but states that her menses are very heavy at the start. Patient also requests dose adjustment for Ozempic as discussed from last visit. Review of Systems   Constitutional: Negative for chills and fever. HENT: Negative for ear pain and sore throat. Eyes: Negative for pain and visual disturbance. Respiratory: Negative for cough and shortness of breath. Cardiovascular: Negative for chest pain and palpitations. Gastrointestinal: Negative for abdominal pain and vomiting. Genitourinary: Positive for menstrual problem. Negative for dysuria and hematuria. Musculoskeletal: Negative for arthralgias and back pain. Skin: Negative for color change and rash. Neurological: Negative for seizures and syncope. All other systems reviewed and are negative.       Past Medical History:   Diagnosis Date • Asthma    • Diabetes mellitus ([address] [facility])        Past Surgical History:   Procedure Laterality Date   • ADENOIDECTOMY     •  SECTION     • COSMETIC SURGERY      stomach- lipo and fat transfer   • FOOT FRACTURE SURGERY     • TYMPANOSTOMY TUBE PLACEMENT      fell out       Family History   Problem Relation Age of Onset   • Hypertension Mother    • Diabetes Mother    • Stomach cancer Father    • Colon cancer Father    • Diabetes Father    • Lung cancer Maternal Grandmother    • No Known Problems Maternal Grandfather    • No Known Problems Paternal Grandmother    • No Known Problems Paternal Grandfather    • No Known Problems Half-Brother    • No Known Problems Half-Brother    • Stomach cancer Cousin    • Hypertension Maternal Aunt    • Diabetes Maternal Aunt    • Hypertension Maternal Aunt    • No Known Problems Paternal Aunt    • Diabetes Paternal Aunt    • No Known Problems Paternal Aunt    • No Known Problems Paternal Aunt        Social History     Socioeconomic History   • Marital status: Single     Spouse name: None   • Number of children: None   • Years of education: None   • Highest education level: GED or equivalent   Occupational History     Comment:    Tobacco Use   • Smoking status: Every Day     Packs/day: 1.00     Years: 15.00     Total pack years: 15.00     Types: Cigarettes   • Smokeless tobacco: Never   Vaping Use   • Vaping Use: Never used   Substance and Sexual Activity   • Alcohol use: Not Currently   • Drug use: Never   • Sexual activity: Yes     Partners: Male   Other Topics Concern   • None   Social History Narrative    Lives in house with  and 2 kids     Social Determinants of Health     Financial Resource Strain: Not on file   Food Insecurity: Not on file   Transportation Needs: Not on file   Physical Activity: Not on file   Stress: Not on file   Social Connections: Not on file   Intimate Partner Violence: Not on file   Housing Stability: Not on file       No Known Allergies      Current Outpatient Medications:   •  albuterol (2.5 mg/3 mL) 0.083 % nebulizer solution, Take 3 mL (2.5 mg total) by nebulization every 6 (six) hours as needed for wheezing or shortness of breath, Disp: 75 mL, Rfl: 0  •  Blood Glucose Monitoring Suppl (OneTouch Verio Reflect) w/Device KIT, Check blood sugars once daily. Please substitute with appropriate alternative as covered by patient's insurance. Dx: E11.65, Disp: 1 kit, Rfl: 0  •  Continuous Blood Gluc Sensor (FreeStyle Richelle 3 Sensor) MISC, , Disp: , Rfl:   •  Diclofenac Sodium (VOLTAREN) 1 %, Apply 2 g topically 4 (four) times a day, Disp: 100 g, Rfl: 1  •  glimepiride (AMARYL) 2 mg tablet, Take 1 tablet (2 mg total) by mouth daily with breakfast, Disp: 60 tablet, Rfl: 0  •  glucose blood (OneTouch Verio) test strip, Check blood sugars once daily. Please substitute with appropriate alternative as covered by patient's insurance. Dx: E11.65, Disp: 100 each, Rfl: 3  •  halobetasol (ULTRAVATE) 0.05 % cream, Apply topically 2 (two) times a day, Disp: 50 g, Rfl: 0  •  lidocaine (Lidoderm) 5 %, Apply 1 patch topically over 12 hours daily Remove & Discard patch within 12 hours or as directed by MD, Disp: 30 patch, Rfl: 2  •  NovoFine Plus Pen Needle 32G X 4 MM MISC, USE TO INJECT MEDICATION UNDER THE SKIN ONCE WEEKLY FOR 30 DAYS, Disp: , Rfl:   •  OneTouch Delica Lancets 04F MISC, Check blood sugars once daily. Please substitute with appropriate alternative as covered by patient's insurance.  Dx: E11.65, Disp: 100 each, Rfl: 3  •  semaglutide, 2 mg/dose, (Ozempic) 8 mg/ mL injection pen, Inject 0.75 mL (2 mg total) under the skin every 7 days, Disp: 3 mL, Rfl: 2  •  Respiratory Therapy Supplies (Nebulizer) KAMINI, Use as needed (Shortness of breath, wheezing) (Patient not taking: Reported on 9/21/2022), Disp: 1 each, Rfl: 0    Objective     Vitals:    09/05/23 0847   BP: 126/72   Pulse: 77   Temp: 98.5 °F (36.9 °C)   TempSrc: Tympanic   SpO2: 99% Weight: 87.1 kg (192 lb)   Height: 5' 4" (1.626 m)       Physical Exam  Exam conducted with a chaperone present. Chest:   Breasts:     Right: No swelling, bleeding, inverted nipple, mass, nipple discharge, skin change or tenderness. Left: No swelling, bleeding, inverted nipple, mass, nipple discharge, skin change or tenderness. Genitourinary:     Pubic Area: No rash. Labia:         Right: No rash, tenderness or lesion. Left: No rash, tenderness or lesion. Vagina: No vaginal discharge, erythema, tenderness, bleeding or lesions. Cervix: No discharge, friability, lesion, erythema or cervical bleeding. Lymphadenopathy:      Upper Body:      Right upper body: No axillary adenopathy. Left upper body: No axillary adenopathy.          Danika Cornejo MD  Family Medicine

## 2023-09-12 ENCOUNTER — TELEPHONE (OUTPATIENT)
Dept: FAMILY MEDICINE CLINIC | Facility: CLINIC | Age: 43
End: 2023-09-12

## 2023-09-12 LAB
LAB AP GYN PRIMARY INTERPRETATION: NORMAL
LAB AP LMP: NORMAL
Lab: NORMAL

## 2023-09-12 NOTE — TELEPHONE ENCOUNTER
Informed patient that PAP was negative for intraepithelial lesion or malignancy. Even though the specimen was satisfactory for evaluation, there was absence of endocervical/transformation zone component. With patient's h/o menorrhagia, this may be important. Patient was referred to OB/GYN and she states that she would be setting up an appointment soon. Advised patient to follow-up with OB/GYN to see if Pap needs to be repeated.

## 2023-09-20 ENCOUNTER — RA CDI HCC (OUTPATIENT)
Dept: OTHER | Facility: HOSPITAL | Age: 43
End: 2023-09-20

## 2023-09-20 NOTE — PROGRESS NOTES
[address] [facility] coding opportunities          Chart Reviewed number of suggestions sent to Provider: 1   e11.65    Patients Insurance        Commercial Insurance: [employer]

## 2023-11-10 ENCOUNTER — TELEPHONE (OUTPATIENT)
Dept: FAMILY MEDICINE CLINIC | Facility: CLINIC | Age: 43
End: 2023-11-10

## 2023-11-10 DIAGNOSIS — E11.9 TYPE 2 DIABETES MELLITUS WITHOUT COMPLICATION, WITHOUT LONG-TERM CURRENT USE OF INSULIN (HCC): Primary | ICD-10-CM

## 2023-11-10 DIAGNOSIS — E11.9 TYPE 2 DIABETES MELLITUS WITHOUT COMPLICATION, WITHOUT LONG-TERM CURRENT USE OF INSULIN (HCC): ICD-10-CM

## 2023-11-10 NOTE — TELEPHONE ENCOUNTER
Sent ozempic to preferred pharmacy. Patient needs to schedule appointment for DM rey asap. Needs labs done prior to the visit.

## 2023-11-10 NOTE — TELEPHONE ENCOUNTER
Patient called and said she hasn't taken ozempic since October because the pharmacy hasn't had  the 2mg dose available. She is asking if you can change the mg from 2mg to 1mg. [facility] in [place] has the 1mg available. She said her diabetes has been in the 3's and she's very concerned.

## 2023-11-10 NOTE — TELEPHONE ENCOUNTER
I can call her to reschedule her appointment. Do you want to wait to see her then before calling in the script?

## 2023-11-28 NOTE — TELEPHONE ENCOUNTER
Patient called stating [facility] in [place] now has the Ozempic 2mg in stock. She wants to know if you could send in the Ozempic 2mg script please? To [facility] in [place].

## 2023-11-30 ENCOUNTER — TELEPHONE (OUTPATIENT)
Dept: FAMILY MEDICINE CLINIC | Facility: CLINIC | Age: 43
End: 2023-11-30

## 2023-11-30 DIAGNOSIS — E11.9 TYPE 2 DIABETES MELLITUS WITHOUT COMPLICATION, WITHOUT LONG-TERM CURRENT USE OF INSULIN (HCC): Primary | ICD-10-CM

## 2023-11-30 NOTE — TELEPHONE ENCOUNTER
Patient is requesting ozempic 2mg to go to her [facility] pharmacy. She states that they didn't have it before but now they do. Please advise on this.

## 2023-12-01 NOTE — TELEPHONE ENCOUNTER
Patient called. The Ozempic 2mg was the dosage you originally sent in for her, but it was out of stock so she had you change it to the 1mg. Now, the pharmacy has the 2mg back in stock so she wants to know if you could switch it back to the 2mg now that they have it?

## 2023-12-04 ENCOUNTER — APPOINTMENT (OUTPATIENT)
Dept: LAB | Facility: MEDICAL CENTER | Age: 43
End: 2023-12-04
Payer: COMMERCIAL

## 2023-12-04 DIAGNOSIS — E11.9 TYPE 2 DIABETES MELLITUS WITHOUT COMPLICATION, WITHOUT LONG-TERM CURRENT USE OF INSULIN (HCC): ICD-10-CM

## 2023-12-04 LAB
ALBUMIN SERPL BCP-MCNC: 4.1 G/DL (ref 3.5–5)
ALP SERPL-CCNC: 67 U/L (ref 34–104)
ALT SERPL W P-5'-P-CCNC: 13 U/L (ref 7–52)
ANION GAP SERPL CALCULATED.3IONS-SCNC: 7 MMOL/L
AST SERPL W P-5'-P-CCNC: 13 U/L (ref 13–39)
BILIRUB SERPL-MCNC: 0.53 MG/DL (ref 0.2–1)
BUN SERPL-MCNC: 13 MG/DL (ref 5–25)
CALCIUM SERPL-MCNC: 10.2 MG/DL (ref 8.4–10.2)
CHLORIDE SERPL-SCNC: 105 MMOL/L (ref 96–108)
CHOLEST SERPL-MCNC: 156 MG/DL
CO2 SERPL-SCNC: 28 MMOL/L (ref 21–32)
CREAT SERPL-MCNC: 0.8 MG/DL (ref 0.6–1.3)
EST. AVERAGE GLUCOSE BLD GHB EST-MCNC: 171 MG/DL
GFR SERPL CREATININE-BSD FRML MDRD: 89 ML/MIN/1.73SQ M
GLUCOSE P FAST SERPL-MCNC: 153 MG/DL (ref 65–99)
HBA1C MFR BLD: 7.6 %
HDLC SERPL-MCNC: 31 MG/DL
LDLC SERPL CALC-MCNC: 98 MG/DL (ref 0–100)
NONHDLC SERPL-MCNC: 125 MG/DL
POTASSIUM SERPL-SCNC: 4.4 MMOL/L (ref 3.5–5.3)
PROT SERPL-MCNC: 7.1 G/DL (ref 6.4–8.4)
SODIUM SERPL-SCNC: 140 MMOL/L (ref 135–147)
T4 FREE SERPL-MCNC: 0.79 NG/DL (ref 0.61–1.12)
TRIGL SERPL-MCNC: 133 MG/DL
TSH SERPL DL<=0.05 MIU/L-ACNC: 3.07 UIU/ML (ref 0.45–4.5)

## 2023-12-04 PROCEDURE — 80061 LIPID PANEL: CPT

## 2023-12-04 PROCEDURE — 84443 ASSAY THYROID STIM HORMONE: CPT

## 2023-12-04 PROCEDURE — 80053 COMPREHEN METABOLIC PANEL: CPT

## 2023-12-04 PROCEDURE — 84439 ASSAY OF FREE THYROXINE: CPT

## 2023-12-04 PROCEDURE — 83036 HEMOGLOBIN GLYCOSYLATED A1C: CPT

## 2023-12-04 PROCEDURE — 36415 COLL VENOUS BLD VENIPUNCTURE: CPT

## 2023-12-04 NOTE — TELEPHONE ENCOUNTER
I have sent the refill to her preferred pharmacy. Patient still needs follow up and needs her labs done.

## 2023-12-21 ENCOUNTER — HOSPITAL ENCOUNTER (EMERGENCY)
Facility: HOSPITAL | Age: 43
Discharge: HOME | End: 2023-12-21
Attending: EMERGENCY MEDICINE
Payer: COMMERCIAL

## 2023-12-21 ENCOUNTER — APPOINTMENT (EMERGENCY)
Dept: RADIOLOGY | Facility: HOSPITAL | Age: 43
End: 2023-12-21
Payer: COMMERCIAL

## 2023-12-21 VITALS
DIASTOLIC BLOOD PRESSURE: 73 MMHG | OXYGEN SATURATION: 97 % | RESPIRATION RATE: 18 BRPM | HEART RATE: 94 BPM | SYSTOLIC BLOOD PRESSURE: 119 MMHG | TEMPERATURE: 97.4 F

## 2023-12-21 DIAGNOSIS — R68.89 FLU-LIKE SYMPTOMS: Primary | ICD-10-CM

## 2023-12-21 DIAGNOSIS — R11.2 NAUSEA AND VOMITING: ICD-10-CM

## 2023-12-21 DIAGNOSIS — E11.65 HYPERGLYCEMIA DUE TO DIABETES MELLITUS (HCC): ICD-10-CM

## 2023-12-21 LAB
ANION GAP SERPL CALCULATED.3IONS-SCNC: 9 MMOL/L
BASOPHILS # BLD AUTO: 0.04 THOUSANDS/ÂΜL (ref 0–0.1)
BASOPHILS NFR BLD AUTO: 0 % (ref 0–1)
BILIRUB UR QL STRIP: NEGATIVE
BUN SERPL-MCNC: 14 MG/DL (ref 5–25)
CALCIUM SERPL-MCNC: 9.2 MG/DL (ref 8.4–10.2)
CHLORIDE SERPL-SCNC: 105 MMOL/L (ref 96–108)
CLARITY UR: ABNORMAL
CO2 SERPL-SCNC: 23 MMOL/L (ref 21–32)
COLOR UR: YELLOW
CREAT SERPL-MCNC: 0.82 MG/DL (ref 0.6–1.3)
EOSINOPHIL # BLD AUTO: 0.15 THOUSAND/ÂΜL (ref 0–0.61)
EOSINOPHIL NFR BLD AUTO: 1 % (ref 0–6)
ERYTHROCYTE [DISTWIDTH] IN BLOOD BY AUTOMATED COUNT: 13.1 % (ref 11.6–15.1)
FLUAV RNA RESP QL NAA+PROBE: NEGATIVE
FLUBV RNA RESP QL NAA+PROBE: NEGATIVE
GFR SERPL CREATININE-BSD FRML MDRD: 86 ML/MIN/1.73SQ M
GLUCOSE SERPL-MCNC: 256 MG/DL (ref 65–140)
GLUCOSE UR STRIP-MCNC: ABNORMAL MG/DL
HCT VFR BLD AUTO: 42.3 % (ref 34.8–46.1)
HGB BLD-MCNC: 14.1 G/DL (ref 11.5–15.4)
HGB UR QL STRIP.AUTO: NEGATIVE
IMM GRANULOCYTES # BLD AUTO: 0.02 THOUSAND/UL (ref 0–0.2)
IMM GRANULOCYTES NFR BLD AUTO: 0 % (ref 0–2)
KETONES UR STRIP-MCNC: NEGATIVE MG/DL
LEUKOCYTE ESTERASE UR QL STRIP: NEGATIVE
LYMPHOCYTES # BLD AUTO: 3.1 THOUSANDS/ÂΜL (ref 0.6–4.47)
LYMPHOCYTES NFR BLD AUTO: 26 % (ref 14–44)
MCH RBC QN AUTO: 29.6 PG (ref 26.8–34.3)
MCHC RBC AUTO-ENTMCNC: 33.3 G/DL (ref 31.4–37.4)
MCV RBC AUTO: 89 FL (ref 82–98)
MONOCYTES # BLD AUTO: 0.54 THOUSAND/ÂΜL (ref 0.17–1.22)
MONOCYTES NFR BLD AUTO: 5 % (ref 4–12)
NEUTROPHILS # BLD AUTO: 7.98 THOUSANDS/ÂΜL (ref 1.85–7.62)
NEUTS SEG NFR BLD AUTO: 68 % (ref 43–75)
NITRITE UR QL STRIP: NEGATIVE
NRBC BLD AUTO-RTO: 0 /100 WBCS
PH UR STRIP.AUTO: 6 [PH]
PLATELET # BLD AUTO: 258 THOUSANDS/UL (ref 149–390)
PMV BLD AUTO: 9.9 FL (ref 8.9–12.7)
POTASSIUM SERPL-SCNC: 3.9 MMOL/L (ref 3.5–5.3)
PROT UR STRIP-MCNC: NEGATIVE MG/DL
RBC # BLD AUTO: 4.77 MILLION/UL (ref 3.81–5.12)
RSV RNA RESP QL NAA+PROBE: NEGATIVE
SARS-COV-2 RNA RESP QL NAA+PROBE: NEGATIVE
SODIUM SERPL-SCNC: 137 MMOL/L (ref 135–147)
SP GR UR STRIP.AUTO: 1.02 (ref 1–1.03)
UROBILINOGEN UR QL STRIP.AUTO: 0.2 E.U./DL
WBC # BLD AUTO: 11.83 THOUSAND/UL (ref 4.31–10.16)

## 2023-12-21 PROCEDURE — 96375 TX/PRO/DX INJ NEW DRUG ADDON: CPT

## 2023-12-21 PROCEDURE — 85025 COMPLETE CBC W/AUTO DIFF WBC: CPT | Performed by: PHYSICIAN ASSISTANT

## 2023-12-21 PROCEDURE — 96374 THER/PROPH/DIAG INJ IV PUSH: CPT

## 2023-12-21 PROCEDURE — 36415 COLL VENOUS BLD VENIPUNCTURE: CPT | Performed by: PHYSICIAN ASSISTANT

## 2023-12-21 PROCEDURE — 71045 X-RAY EXAM CHEST 1 VIEW: CPT

## 2023-12-21 PROCEDURE — 0241U HB NFCT DS VIR RESP RNA 4 TRGT: CPT | Performed by: PHYSICIAN ASSISTANT

## 2023-12-21 PROCEDURE — 81003 URINALYSIS AUTO W/O SCOPE: CPT | Performed by: PHYSICIAN ASSISTANT

## 2023-12-21 PROCEDURE — 99284 EMERGENCY DEPT VISIT MOD MDM: CPT

## 2023-12-21 PROCEDURE — 80048 BASIC METABOLIC PNL TOTAL CA: CPT | Performed by: PHYSICIAN ASSISTANT

## 2023-12-21 PROCEDURE — 99284 EMERGENCY DEPT VISIT MOD MDM: CPT | Performed by: PHYSICIAN ASSISTANT

## 2023-12-21 RX ORDER — ONDANSETRON 2 MG/ML
4 INJECTION INTRAMUSCULAR; INTRAVENOUS ONCE
Status: COMPLETED | OUTPATIENT
Start: 2023-12-21 | End: 2023-12-21

## 2023-12-21 RX ORDER — AZITHROMYCIN 250 MG/1
500 TABLET, FILM COATED ORAL ONCE
Status: COMPLETED | OUTPATIENT
Start: 2023-12-21 | End: 2023-12-21

## 2023-12-21 RX ORDER — ALBUTEROL SULFATE 90 UG/1
1-2 AEROSOL, METERED RESPIRATORY (INHALATION) EVERY 4 HOURS PRN
Qty: 8 G | Refills: 0 | Status: SHIPPED | OUTPATIENT
Start: 2023-12-21

## 2023-12-21 RX ORDER — KETOROLAC TROMETHAMINE 30 MG/ML
15 INJECTION, SOLUTION INTRAMUSCULAR; INTRAVENOUS ONCE
Status: COMPLETED | OUTPATIENT
Start: 2023-12-21 | End: 2023-12-21

## 2023-12-21 RX ORDER — AZITHROMYCIN 250 MG/1
250 TABLET, FILM COATED ORAL EVERY 24 HOURS
Qty: 4 TABLET | Refills: 0 | Status: SHIPPED | OUTPATIENT
Start: 2023-12-22 | End: 2023-12-26

## 2023-12-21 RX ORDER — ONDANSETRON 4 MG/1
4 TABLET, ORALLY DISINTEGRATING ORAL EVERY 6 HOURS PRN
Qty: 12 TABLET | Refills: 0 | Status: SHIPPED | OUTPATIENT
Start: 2023-12-21

## 2023-12-21 RX ADMIN — KETOROLAC TROMETHAMINE 15 MG: 30 INJECTION, SOLUTION INTRAMUSCULAR at 15:16

## 2023-12-21 RX ADMIN — ONDANSETRON 4 MG: 2 INJECTION INTRAMUSCULAR; INTRAVENOUS at 15:16

## 2023-12-21 RX ADMIN — AZITHROMYCIN 500 MG: 250 TABLET, FILM COATED ORAL at 17:57

## 2023-12-21 NOTE — Clinical Note
Ramona Lira was seen and treated in our emergency department on 12/21/2023.                Diagnosis:     Ramona  .    She may return on this date: 12/25/2023         If you have any questions or concerns, please don't hesitate to call.      Chantelle Espana PA-C    ______________________________           _______________          _______________  Hospital Representative                              Date                                Time

## 2023-12-21 NOTE — ED PROVIDER NOTES
History  Chief Complaint   Patient presents with    Flu Symptoms     Patient c/o body aches, weakness, SOB beginning yesterday, states fever at home today. Son recently tested + for flu. States took 1500 mg of Tylenol at home     43 year old female with PMH DM, asthma presenting for evaluation of flu symptoms.  Pt reports onset of symptoms started x2 days ago.  She reports fever, chills, body aches.  She reports headache, congestion.  She has nausea, vomiting, and c/o shortness of breath.  Denies chest pain.  Denies cough.  Denies abdominal pain, diarrhea, constipation.  Denies any specific urinary complaints but feels her urine has decreased.  She reports feeling dry.  She states she took tylenol prior to arrival.  No reported aggravating or alleviating factors.  She notes was exposed to her son who has flu.      History provided by:  Patient and medical records   used: No    Flu Symptoms  Presenting symptoms: fatigue, fever, headache, myalgias, nausea, rhinorrhea, shortness of breath and vomiting    Presenting symptoms: no cough, no diarrhea and no sore throat    Duration:  2 days  Progression:  Unchanged  Chronicity:  New  Relieved by:  Nothing  Worsened by:  Nothing  Ineffective treatments:  OTC medications and rest  Associated symptoms: chills and nasal congestion    Associated symptoms: no mental status change and no syncope    Risk factors: diabetes and sick contacts        Prior to Admission Medications   Prescriptions Last Dose Informant Patient Reported? Taking?   Blood Glucose Monitoring Suppl (OneTouch Verio Reflect) w/Device KIT   No No   Sig: Check blood sugars once daily. Please substitute with appropriate alternative as covered by patient's insurance. Dx: E11.65   Continuous Blood Gluc Sensor (FreeStyle Richelle 3 Sensor) MISC   Yes No   Diclofenac Sodium (VOLTAREN) 1 %   No No   Sig: Apply 2 g topically 4 (four) times a day   NovoFine Plus Pen Needle 32G X 4 MM MISC   Yes No   Sig:  USE TO INJECT MEDICATION UNDER THE SKIN ONCE WEEKLY FOR 30 DAYS   OneTouch Delica Lancets 33G MISC   No No   Sig: Check blood sugars once daily. Please substitute with appropriate alternative as covered by patient's insurance. Dx: E11.65   Respiratory Therapy Supplies (Nebulizer) KAMINI   No No   Sig: Use as needed (Shortness of breath, wheezing)   Patient not taking: Reported on 2022   albuterol (2.5 mg/3 mL) 0.083 % nebulizer solution   No No   Sig: Take 3 mL (2.5 mg total) by nebulization every 6 (six) hours as needed for wheezing or shortness of breath   glimepiride (AMARYL) 2 mg tablet   No No   Sig: Take 1 tablet (2 mg total) by mouth daily with breakfast   glucose blood (OneTouch Verio) test strip   No No   Sig: Check blood sugars once daily. Please substitute with appropriate alternative as covered by patient's insurance. Dx: E11.65   halobetasol (ULTRAVATE) 0.05 % cream   No No   Sig: Apply topically 2 (two) times a day   lidocaine (Lidoderm) 5 %   No No   Sig: Apply 1 patch topically over 12 hours daily Remove & Discard patch within 12 hours or as directed by MD   semaglutide, 2 mg/dose, (Ozempic) 8 mg/ mL injection pen   No No   Sig: Inject 0.75 mL (2 mg total) under the skin every 7 days      Facility-Administered Medications: None       Past Medical History:   Diagnosis Date    Asthma     Diabetes mellitus (HCC)        Past Surgical History:   Procedure Laterality Date    ADENOIDECTOMY       SECTION      COSMETIC SURGERY      stomach- lipo and fat transfer    FOOT FRACTURE SURGERY      TYMPANOSTOMY TUBE PLACEMENT      fell out       Family History   Problem Relation Age of Onset    Hypertension Mother     Diabetes Mother     Stomach cancer Father     Colon cancer Father     Diabetes Father     Lung cancer Maternal Grandmother     No Known Problems Maternal Grandfather     No Known Problems Paternal Grandmother     No Known Problems Paternal Grandfather     No Known Problems Half-Brother      No Known Problems Half-Brother     Stomach cancer Cousin     Hypertension Maternal Aunt     Diabetes Maternal Aunt     Hypertension Maternal Aunt     No Known Problems Paternal Aunt     Diabetes Paternal Aunt     No Known Problems Paternal Aunt     No Known Problems Paternal Aunt      I have reviewed and agree with the history as documented.    E-Cigarette/Vaping    E-Cigarette Use Never User      E-Cigarette/Vaping Substances    Nicotine No     THC No     CBD No     Flavoring No     Other No     Unknown No      Social History     Tobacco Use    Smoking status: Every Day     Current packs/day: 1.00     Average packs/day: 1 pack/day for 15.0 years (15.0 ttl pk-yrs)     Types: Cigarettes    Smokeless tobacco: Never   Vaping Use    Vaping status: Never Used   Substance Use Topics    Alcohol use: Not Currently    Drug use: Never       Review of Systems   Constitutional:  Positive for chills, fatigue and fever.   HENT:  Positive for congestion and rhinorrhea. Negative for sore throat.    Eyes: Negative.  Negative for visual disturbance.   Respiratory:  Positive for shortness of breath. Negative for cough and wheezing.    Cardiovascular: Negative.  Negative for chest pain, palpitations and leg swelling.   Gastrointestinal:  Positive for nausea and vomiting. Negative for abdominal pain, constipation and diarrhea.   Genitourinary:  Positive for decreased urine volume. Negative for dysuria, flank pain, frequency and hematuria.   Musculoskeletal:  Positive for myalgias. Negative for back pain.   Skin: Negative.  Negative for rash.   Neurological:  Positive for headaches. Negative for dizziness, syncope and light-headedness.   Psychiatric/Behavioral: Negative.  Negative for confusion.    All other systems reviewed and are negative.      Physical Exam  Physical Exam  Vitals and nursing note reviewed.   Constitutional:       General: She is awake. She is not in acute distress.     Appearance: She is well-developed and  overweight. She is not toxic-appearing or diaphoretic.   HENT:      Head: Normocephalic and atraumatic.      Right Ear: Hearing, tympanic membrane, ear canal and external ear normal.      Left Ear: Hearing, tympanic membrane, ear canal and external ear normal.      Nose: Congestion present.      Mouth/Throat:      Mouth: Mucous membranes are moist. No oral lesions.      Pharynx: Oropharynx is clear. Uvula midline. No oropharyngeal exudate.   Eyes:      General: Lids are normal. No scleral icterus.     Conjunctiva/sclera: Conjunctivae normal.      Pupils: Pupils are equal, round, and reactive to light.   Neck:      Trachea: Trachea and phonation normal. No tracheal deviation.   Cardiovascular:      Rate and Rhythm: Normal rate and regular rhythm.      Pulses: Normal pulses.      Heart sounds: Normal heart sounds, S1 normal and S2 normal. No murmur heard.  Pulmonary:      Effort: Pulmonary effort is normal. No tachypnea, accessory muscle usage or respiratory distress.      Breath sounds: Normal breath sounds. No stridor. No wheezing, rhonchi or rales.   Abdominal:      General: Bowel sounds are normal. There is no distension.      Palpations: Abdomen is soft.      Tenderness: There is no abdominal tenderness. There is no guarding or rebound.   Musculoskeletal:      Cervical back: Normal range of motion and neck supple.      Right lower leg: No edema.      Left lower leg: No edema.   Skin:     General: Skin is warm and dry.      Capillary Refill: Capillary refill takes less than 2 seconds.      Findings: No rash.   Neurological:      General: No focal deficit present.      Mental Status: She is alert and oriented to person, place, and time.      GCS: GCS eye subscore is 4. GCS verbal subscore is 5. GCS motor subscore is 6.      Cranial Nerves: No cranial nerve deficit.      Sensory: No sensory deficit.      Motor: No abnormal muscle tone.   Psychiatric:         Mood and Affect: Mood normal.         Speech: Speech  normal.         Behavior: Behavior normal. Behavior is cooperative.         Vital Signs  ED Triage Vitals [12/21/23 1417]   Temperature Pulse Respirations Blood Pressure SpO2   (!) 97.4 °F (36.3 °C) 94 18 119/73 97 %      Temp Source Heart Rate Source Patient Position - Orthostatic VS BP Location FiO2 (%)   Temporal Monitor -- -- --      Pain Score       --           Vitals:    12/21/23 1417   BP: 119/73   Pulse: 94         Visual Acuity      ED Medications  Medications   sodium chloride 0.9 % bolus 1,000 mL (1,000 mL Intravenous Not Given 12/21/23 1517)   ondansetron (ZOFRAN) injection 4 mg (4 mg Intravenous Given 12/21/23 1516)   ketorolac (TORADOL) injection 15 mg (15 mg Intravenous Given 12/21/23 1516)   azithromycin (ZITHROMAX) tablet 500 mg (500 mg Oral Given 12/21/23 1757)       Diagnostic Studies  Results Reviewed       Procedure Component Value Units Date/Time    UA w Reflex to Microscopic w Reflex to Culture [[id number]]  (Abnormal) Collected: 12/21/23 1714    Lab Status: Final result Specimen: Urine, Clean Catch Updated: 12/21/23 1721     Color, UA Yellow     Clarity, UA Slightly Cloudy     Specific Gravity, UA 1.025     pH, UA 6.0     Leukocytes, UA Negative     Nitrite, UA Negative     Protein, UA Negative mg/dl      Glucose,  (1/4%) mg/dl      Ketones, UA Negative mg/dl      Urobilinogen, UA 0.2 E.U./dl      Bilirubin, UA Negative     Occult Blood, UA Negative    Basic metabolic panel [[id number]]  (Abnormal) Collected: 12/21/23 1502    Lab Status: Final result Specimen: Blood from Arm, Right Updated: 12/21/23 1533     Sodium 137 mmol/L      Potassium 3.9 mmol/L      Chloride 105 mmol/L      CO2 23 mmol/L      ANION GAP 9 mmol/L      BUN 14 mg/dL      Creatinine 0.82 mg/dL      Glucose 256 mg/dL      Calcium 9.2 mg/dL      eGFR 86 ml/min/1.73sq m     Narrative:      National Kidney Disease Foundation guidelines for Chronic Kidney Disease (CKD):     Stage 1 with normal or high GFR (GFR > 90  mL/min/1.73 square meters)    Stage 2 Mild CKD (GFR = 60-89 mL/min/1.73 square meters)    Stage 3A Moderate CKD (GFR = 45-59 mL/min/1.73 square meters)    Stage 3B Moderate CKD (GFR = 30-44 mL/min/1.73 square meters)    Stage 4 Severe CKD (GFR = 15-29 mL/min/1.73 square meters)    Stage 5 End Stage CKD (GFR <15 mL/min/1.73 square meters)  Note: GFR calculation is accurate only with a steady state creatinine    CBC and differential [[id number]]  (Abnormal) Collected: 12/21/23 1502    Lab Status: Final result Specimen: Blood from Arm, Right Updated: 12/21/23 1512     WBC 11.83 Thousand/uL      RBC 4.77 Million/uL      Hemoglobin 14.1 g/dL      Hematocrit 42.3 %      MCV 89 fL      MCH 29.6 pg      MCHC 33.3 g/dL      RDW 13.1 %      MPV 9.9 fL      Platelets 258 Thousands/uL      nRBC 0 /100 WBCs      Neutrophils Relative 68 %      Immat GRANS % 0 %      Lymphocytes Relative 26 %      Monocytes Relative 5 %      Eosinophils Relative 1 %      Basophils Relative 0 %      Neutrophils Absolute 7.98 Thousands/µL      Immature Grans Absolute 0.02 Thousand/uL      Lymphocytes Absolute 3.10 Thousands/µL      Monocytes Absolute 0.54 Thousand/µL      Eosinophils Absolute 0.15 Thousand/µL      Basophils Absolute 0.04 Thousands/µL     FLU/RSV/COVID - if FLU/RSV clinically relevant [[id number]]  (Normal) Collected: 12/21/23 1421    Lab Status: Final result Specimen: Nares from Nose Updated: 12/21/23 1503     SARS-CoV-2 Negative     INFLUENZA A PCR Negative     INFLUENZA B PCR Negative     RSV PCR Negative    Narrative:      FOR PEDIATRIC PATIENTS - copy/paste COVID Guidelines URL to browser: [email or web address]    SARS-CoV-2 assay is a Nucleic Acid Amplification assay intended for the  qualitative detection of nucleic acid from SARS-CoV-2 in nasopharyngeal  swabs. Results are for the presumptive identification of SARS-CoV-2 RNA.    Positive results are indicative of infection  with SARS-CoV-2, the virus  causing COVID-19, but do not rule out bacterial infection or co-infection  with other viruses. Laboratories within the [place] and its  territories are required to report all positive results to the appropriate  public health authorities. Negative results do not preclude SARS-CoV-2  infection and should not be used as the sole basis for treatment or other  patient management decisions. Negative results must be combined with  clinical observations, patient history, and epidemiological information.  This test has not been FDA cleared or approved.    This test has been authorized by FDA under an Emergency Use Authorization  (EUA). This test is only authorized for the duration of time the  declaration that circumstances exist justifying the authorization of the  emergency use of an in vitro diagnostic tests for detection of SARS-CoV-2  virus and/or diagnosis of COVID-19 infection under section 564(b)(1) of  the Act, 21 U.S.C. 360bbb-3(b)(1), unless the authorization is terminated  or revoked sooner. The test has been validated but independent review by FDA  and CLIA is pending.    Test performed using Nusocketpert: This RT-PCR assay targets N2,  a region unique to SARS-CoV-2. A conserved region in the E-gene was chosen  for pan-Sarbecovirus detection which includes SARS-CoV-2.    According to CMS-2020-01-R, this platform meets the definition of high-throughput technology.                   XR chest 1 view portable    (Results Pending)              Procedures  Procedures         ED Course  ED Course as of 12/21/23 1854   Thu Dec 21, 2023   1507 SARS-COV-2: Negative   1507 INFLU A PCR: Negative   1507 INFLU B PCR: Negative   1507 RSV PCR: Negative   1516 WBC(!): 11.83  Mildly elevated although similar to prior values last year   1517 Hemoglobin: 14.1   1517 Platelet Count: 258   1521 Pt had burning with the IV, requested it be removed.  Does not want repeat stick and declines the  ordered fluids.   1533 Glucose, Random(!): 256  Hyperglycemia in setting of known diabetes; no other findings to suggest DKA   1533 Creatinine: 0.82   1533 BUN: 14   1533 Sodium: 137   1533 Potassium: 3.9   1533 Chloride: 105   1533 CO2: 23   1533 Anion Gap: 9   1533 Calcium: 9.2   1533 eGFR: 86   1610 XR chest 1 view portable  Independently viewed and interpreted by me - increased haziness on the right medial lung base; pending official read.   1650 Pt ambulatory to bathroom, steady gait.   1723 Glucose, UA(!): 250 (1/4%)  UA shows glucose other not suggestive of infection   1733 Pt again reassessed.  No distress.  Pt tolerating PO.  Will discharge with symptomatic management and outpatient follow up.                                             Medical Decision Making  42 yo female presenting for evaluation of flu like symptoms.  Was recently exposed to flu.  Will obtain viral swab.  Will obtain CXR, UA.  Will check basic labs and provide symptom management.  Lungs are clear, respiratory status stable on room air.  Abdomen soft, nontender.  Not exhibiting an acute abdomen.    Work up obtained as noted above.  Mild nonspecific leukocytosis.  No anemia.  Mild hyperglycemia in setting of diabetes.  Normal renal function and electrolytes.  UA not suggestive of infection.  Respiratory status on room air.  Pt tolerating PO.    Reviewed symptomatic management.  OTC meds reviewed.  Anticipatory guidance.  Advised recheck with PCP or return to ER as needed.  Strict return precautions outlined.  Patient voiced understanding and had no further questions.    Please refer to above ER course for further details/discussion.      Problems Addressed:  Flu-like symptoms: acute illness or injury     Details: Probable viral syndrome.  Hyperglycemia due to diabetes mellitus (HCC): chronic illness or injury     Details: Advised to continue to monitor and follow up with PCP.  Nausea and vomiting: acute illness or injury    Amount and/or  Complexity of Data Reviewed  External Data Reviewed: labs and notes.  Labs: ordered. Decision-making details documented in ED Course.  Radiology: ordered and independent interpretation performed. Decision-making details documented in ED Course.    Risk  OTC drugs.  Prescription drug management.             Disposition  Final diagnoses:   Flu-like symptoms   Hyperglycemia due to diabetes mellitus (HCC)   Nausea and vomiting     Time reflects when diagnosis was documented in both MDM as applicable and the Disposition within this note       Time User Action Codes Description Comment    12/21/2023  5:43 PM OlChantelle dumont Add [R68.89] Flu-like symptoms     12/21/2023  5:43 PM OlvingBarbaraly Add [R05.9] Cough     12/21/2023  5:44 PM OlvingBarbaraly Remove [R05.9] Cough     12/21/2023  5:44 PM OlBarbara dumontly Add [E11.65] Hyperglycemia due to diabetes mellitus (HCC)     12/21/2023  5:45 PM OlBarbara dumontly Add [R11.2] Nausea and vomiting           ED Disposition       ED Disposition   Discharge    Condition   Stable    Date/Time   Thu Dec 21, 2023  5:43 PM    Comment   Ramona Hill discharge to home/self care.                   Follow-up Information       Follow up With Specialties Details Why Contact Info Additional Information    Daryl Amaral,  Family Medicine Schedule an appointment as soon as possible for a visit   [address] [place] [address] [id number] [phone]       [facility] Emergency Department Emergency Medicine  As needed [address] [facility] [address]  [phone] [facility] Emergency Department, [address], [place], [address]            Discharge Medication List as of 12/21/2023  5:47 PM        START taking these medications    Details   albuterol (PROVENTIL HFA,VENTOLIN HFA) 90 mcg/act inhaler Inhale 1-2 puffs every 4 (four) hours as needed for shortness of breath or wheezing, Starting Thu 12/21/2023, Normal      azithromycin  (ZITHROMAX) 250 mg tablet Take 1 tablet (250 mg total) by mouth every 24 hours for 4 days Do not start before December 22, 2023., Starting Fri 12/22/2023, Until Tue 12/26/2023, Normal      ondansetron (ZOFRAN-ODT) 4 mg disintegrating tablet Take 1 tablet (4 mg total) by mouth every 6 (six) hours as needed for vomiting or nausea, Starting Thu 12/21/2023, Normal           CONTINUE these medications which have NOT CHANGED    Details   albuterol (2.5 mg/3 mL) 0.083 % nebulizer solution Take 3 mL (2.5 mg total) by nebulization every 6 (six) hours as needed for wheezing or shortness of breath, Starting Sun 9/18/2022, Normal      Blood Glucose Monitoring Suppl (OneTouch Verio Reflect) w/Device KIT Check blood sugars once daily. Please substitute with appropriate alternative as covered by patient's insurance. Dx: E11.65, Normal      Continuous Blood Gluc Sensor (FreeStyle Richelle 3 Sensor) MISC Historical Med      Diclofenac Sodium (VOLTAREN) 1 % Apply 2 g topically 4 (four) times a day, Starting Thu 6/23/2022, Normal      glimepiride (AMARYL) 2 mg tablet Take 1 tablet (2 mg total) by mouth daily with breakfast, Starting Wed 8/9/2023, Normal      glucose blood (OneTouch Verio) test strip Check blood sugars once daily. Please substitute with appropriate alternative as covered by patient's insurance. Dx: E11.65, Normal      halobetasol (ULTRAVATE) 0.05 % cream Apply topically 2 (two) times a day, Starting Thu 4/21/2022, Normal      lidocaine (Lidoderm) 5 % Apply 1 patch topically over 12 hours daily Remove & Discard patch within 12 hours or as directed by MD, Starting Wed 8/9/2023, Normal      NovoFine Plus Pen Needle 32G X 4 MM MISC USE TO INJECT MEDICATION UNDER THE SKIN ONCE WEEKLY FOR 30 DAYS, Historical Med      OneTouch Delica Lancets 33G MISC Check blood sugars once daily. Please substitute with appropriate alternative as covered by patient's insurance. Dx: E11.65, Normal      Respiratory Therapy Supplies (Nebulizer)  KAMINI Use as needed (Shortness of breath, wheezing), Starting Sun 9/18/2022, Normal      semaglutide, 2 mg/dose, (Ozempic) 8 mg/ mL injection pen Inject 0.75 mL (2 mg total) under the skin every 7 days, Starting Mon 12/4/2023, Normal             No discharge procedures on file.    PDMP Review       None            ED Provider  Electronically Signed by             Chantelle Espana PA-C  12/21/23 9039

## 2023-12-21 NOTE — DISCHARGE INSTRUCTIONS
Rest, plenty of fluids.  Continue to monitor your blood sugars at home.  Zofran as needed for nausea/vomiting.  Albuterol inhaler every 4-6 hours as needed.  Take antibiotic as directed for the full duration.  Continue to alternate OTC tylenol and ibuprofen as needed for fever/discomfort.  Follow up with PCP or return to ER as needed.

## 2023-12-27 ENCOUNTER — HOSPITAL ENCOUNTER (OUTPATIENT)
Dept: ULTRASOUND IMAGING | Facility: HOSPITAL | Age: 43
Discharge: HOME | End: 2023-12-27
Attending: ORTHOPAEDIC SURGERY
Payer: OTHER MISCELLANEOUS

## 2023-12-27 DIAGNOSIS — R20.2 RIGHT HAND PARESTHESIA: ICD-10-CM

## 2023-12-27 PROCEDURE — 76882 US LMTD JT/FCL EVL NVASC XTR: CPT

## 2024-01-06 DIAGNOSIS — E11.9 TYPE 2 DIABETES MELLITUS WITHOUT COMPLICATION, WITHOUT LONG-TERM CURRENT USE OF INSULIN (HCC): ICD-10-CM

## 2024-01-08 RX ORDER — GLIMEPIRIDE 2 MG/1
2 TABLET ORAL
Qty: 60 TABLET | Refills: 0 | Status: SHIPPED | OUTPATIENT
Start: 2024-01-08

## 2024-01-30 ENCOUNTER — OFFICE VISIT (OUTPATIENT)
Dept: FAMILY MEDICINE CLINIC | Facility: CLINIC | Age: 44
End: 2024-01-30

## 2024-01-30 VITALS
HEIGHT: 64 IN | OXYGEN SATURATION: 100 % | BODY MASS INDEX: 32.61 KG/M2 | WEIGHT: 191 LBS | TEMPERATURE: 96 F | SYSTOLIC BLOOD PRESSURE: 112 MMHG | HEART RATE: 100 BPM | DIASTOLIC BLOOD PRESSURE: 70 MMHG

## 2024-01-30 DIAGNOSIS — H53.8 BLURRY VISION, LEFT EYE: ICD-10-CM

## 2024-01-30 DIAGNOSIS — E11.9 TYPE 2 DIABETES MELLITUS WITHOUT COMPLICATION, WITHOUT LONG-TERM CURRENT USE OF INSULIN (HCC): ICD-10-CM

## 2024-01-30 DIAGNOSIS — F17.200 TOBACCO DEPENDENCE: ICD-10-CM

## 2024-01-30 DIAGNOSIS — Z00.00 ANNUAL PHYSICAL EXAM: Primary | ICD-10-CM

## 2024-01-30 RX ORDER — NICOTINE 21 MG/24HR
1 PATCH, TRANSDERMAL 24 HOURS TRANSDERMAL EVERY 24 HOURS
Qty: 28 PATCH | Refills: 0 | Status: SHIPPED | OUTPATIENT
Start: 2024-01-30

## 2024-01-30 NOTE — PATIENT INSTRUCTIONS
Wellness Visit for Adults   AMBULATORY CARE:   A wellness visit  is when you see your healthcare provider to get screened for health problems. Your healthcare provider will also give you advice on how to stay healthy. Write down your questions so you remember to ask them. Ask your healthcare provider how often you should have a wellness visit.  What happens at a wellness visit:  Your healthcare provider will ask about your health, and your family history of health problems. This includes high blood pressure, heart disease, and cancer. He or she will ask if you have symptoms that concern you, if you smoke, and about your mood. You may also be asked about your intake of medicines, supplements, food, and alcohol. Any of the following may be done:  Your weight  will be checked. Your height may also be checked so your body mass index (BMI) can be calculated. Your BMI shows if you are at a healthy weight.    Your blood pressure  and heart rate will be checked. Your temperature may also be checked.    Blood and urine tests  may be done. Blood tests may be done to check your cholesterol levels. Abnormal cholesterol levels increase your risk for heart disease and stroke. You may also need a blood or urine test to check for diabetes if you are at increased risk. Urine tests may be done to look for signs of an infection or kidney disease.    A physical exam  includes checking your heartbeat and lungs with a stethoscope. Your healthcare provider may also check your skin to look for sun damage.    Screening tests  may be recommended. A screening test is done to check for diseases that may not cause symptoms. The screening tests you may need depend on your age, gender, family history, and lifestyle habits. For example, colorectal screening may be recommended if you are 50 years old or older.    Screening tests you need if you are a woman:   A Pap smear  is used to screen for cervical cancer. Pap smears are usually done every 3 to  5 years depending on your age. You may need them more often if you have had abnormal Pap smear test results in the past. Ask your healthcare provider how often you should have a Pap smear.    A mammogram  is an x-ray of your breasts to screen for breast cancer. Experts recommend mammograms every 2 years starting at age 50 years. You may need a mammogram at age 49 years or younger if you have an increased risk for breast cancer. Talk to your healthcare provider about when you should start having mammograms and how often you need them.    Vaccines you may need:   Get an influenza vaccine  every year. The influenza vaccine protects you from the flu. Several types of viruses cause the flu. The viruses change over time, so new vaccines are made each year.    Get a tetanus-diphtheria (Td) booster vaccine  every 10 years. This vaccine protects you against tetanus and diphtheria. Tetanus is a severe infection that may cause painful muscle spasms and lockjaw. Diphtheria is a severe bacterial infection that causes a thick covering in the back of your mouth and throat.    Get a human papillomavirus (HPV) vaccine  if you are female and aged 19 to 26 or male 19 to 21 and never received it. This vaccine protects you from HPV infection. HPV is the most common infection spread by sexual contact. HPV may also cause vaginal, penile, and anal cancers.    Get a pneumococcal vaccine  if you are aged 65 years or older. The pneumococcal vaccine is an injection given to protect you from pneumococcal disease. Pneumococcal disease is an infection caused by pneumococcal bacteria. The infection may cause pneumonia, meningitis, or an ear infection.    Get a shingles vaccine  if you are 60 or older, even if you have had shingles before. The shingles vaccine is an injection to protect you from the varicella-zoster virus. This is the same virus that causes chickenpox. Shingles is a painful rash that develops in people who had chickenpox or have  been exposed to the virus.    How to eat healthy:  My Plate is a model for planning healthy meals. It shows the types and amounts of foods that should go on your plate. Fruits and vegetables make up about half of your plate, and grains and protein make up the other half. A serving of dairy is included on the side of your plate. The amount of calories and serving sizes you need depends on your age, gender, weight, and height. Examples of healthy foods are listed below:  Eat a variety of vegetables  such as dark green, red, and orange vegetables. You can also include canned vegetables low in sodium (salt) and frozen vegetables without added butter or sauces.    Eat a variety of fresh fruits , canned fruit in 100% juice, frozen fruit, and dried fruit.    Include whole grains.  At least half of the grains you eat should be whole grains. Examples include whole-wheat bread, wheat pasta, brown rice, and whole-grain cereals such as oatmeal.    Eat a variety of protein foods such as seafood (fish and shellfish), lean meat, and poultry without skin (turkey and chicken). Examples of lean meats include pork leg, shoulder, or tenderloin, and beef round, sirloin, tenderloin, and extra lean ground beef. Other protein foods include eggs and egg substitutes, beans, peas, soy products, nuts, and seeds.    Choose low-fat dairy products such as skim or 1% milk or low-fat yogurt, cheese, and cottage cheese.    Limit unhealthy fats  such as butter, hard margarine, and shortening.       Exercise:  Exercise at least 30 minutes per day on most days of the week. Some examples of exercise include walking, biking, dancing, and swimming. You can also fit in more physical activity by taking the stairs instead of the elevator or parking farther away from stores. Include muscle strengthening activities 2 days each week. Regular exercise provides many health benefits. It helps you manage your weight, and decreases your risk for type 2 diabetes,  heart disease, stroke, and high blood pressure. Exercise can also help improve your mood. Ask your healthcare provider about the best exercise plan for you.       General health and safety guidelines:   Do not smoke.  Nicotine and other chemicals in cigarettes and cigars can cause lung damage. Ask your healthcare provider for information if you currently smoke and need help to quit. E-cigarettes or smokeless tobacco still contain nicotine. Talk to your healthcare provider before you use these products.    Limit alcohol.  A drink of alcohol is 12 ounces of beer, 5 ounces of wine, or 1½ ounces of liquor.    Lose weight, if needed.  Being overweight increases your risk of certain health conditions. These include heart disease, high blood pressure, type 2 diabetes, and certain types of cancer.    Protect your skin.  Do not sunbathe or use tanning beds. Use sunscreen with a SPF 15 or higher. Apply sunscreen at least 15 minutes before you go outside. Reapply sunscreen every 2 hours. Wear protective clothing, hats, and sunglasses when you are outside.    Drive safely.  Always wear your seatbelt. Make sure everyone in your car wears a seatbelt. A seatbelt can save your life if you are in an accident. Do not use your cell phone when you are driving. This could distract you and cause an accident. Pull over if you need to make a call or send a text message.    Practice safe sex.  Use latex condoms if are sexually active and have more than one partner. Your healthcare provider may recommend screening tests for sexually transmitted infections (STIs).    Wear helmets, lifejackets, and protective gear.  Always wear a helmet when you ride a bike or motorcycle, go skiing, or play sports that could cause a head injury. Wear protective equipment when you play sports. Wear a lifejacket when you are on a boat or doing water sports.    © Copyright [employer] 2023 Information is for End User's use only and may not be sold, redistributed or  otherwise used for commercial purposes.  The above information is an  only. It is not intended as medical advice for individual conditions or treatments. Talk to your doctor, nurse or pharmacist before following any medical regimen to see if it is safe and effective for you.    Cigarette Smoking and Your Health   AMBULATORY CARE:   Risks to your health if you smoke:  Nicotine and other chemicals found in tobacco and e-cigarettes can damage every cell in your body. Even if you are a light smoker, you have an increased risk for cancer, heart disease, and lung disease. If you are pregnant or have diabetes, smoking increases your risk for complications. Nicotine can affect an adolescent's developing brain. This can lead to trouble thinking, learning, or paying attention.  Benefits to your health if you stop smoking:   You decrease respiratory symptoms such as coughing, wheezing, and shortness of breath.    You reduce your risk for cancers of the lung, mouth, throat, kidney, bladder, pancreas, stomach, and cervix. If you already have cancer, you increase the benefits of chemotherapy. You also reduce your risk for cancer returning or a second cancer from developing.    You reduce your risk for heart disease, blood clots, heart attack, and stroke.    You reduce your risk for lung infections, and diseases such as pneumonia, asthma, chronic bronchitis, and emphysema.    Your circulation improves. More oxygen can be delivered to your body. If you have diabetes, you lower your risk for complications, such as kidney, artery, and eye diseases. You also lower your risk for nerve damage. Nerve damage can lead to amputations, poor vision, and blindness.    You improve your body's ability to heal and to fight infections.    An adolescent can help his or her brain and body develop in a healthy way. Talk to your adolescent about all the health risks of nicotine. If you can, start talking about nicotine when your child  is younger than 12 years. This may make it easier for him or her not to start using nicotine as a teenager or adult. Explain to him or her that it is best never to start. It can be hard to try to quit later.    Benefits to the health of others if you stop smoking:  Tobacco is harmful to nonsmokers who breathe in your secondhand smoke. The following are ways the health of others around you may improve when you stop smoking:  You lower the risks for lung cancer, heart disease, and stroke in nonsmoking adults.    If you are pregnant, you lower the risk for miscarriage, early delivery, low birth weight, and stillbirth. You also lower your baby's risk for SIDS, obesity, developmental delay, and neurobehavioral problems, such as ADHD.    If you have children, you lower their risk for ear infections, colds, pneumonia, bronchitis, and asthma.    Follow up with your doctor as directed:  Write down your questions so you remember to ask them during your visits.  For support and more information:   [employer]  [address] [place] [address]. [place] [address]  Phone: 6- 979 - [phone]  Phone: 0- 136 - [phone]  Web Address: www.lung.org    Smokefree.gov  Phone: 8- 187 - 225-1598  Web Address: www.smokefree.gov  © Copyright Merative 2023 Information is for End User's use only and may not be sold, redistributed or otherwise used for commercial purposes.  The above information is an  only. It is not intended as medical advice for individual conditions or treatments. Talk to your doctor, nurse or pharmacist before following any medical regimen to see if it is safe and effective for you.

## 2024-01-30 NOTE — PROGRESS NOTES
ADULT ANNUAL PHYSICAL  [facility]    NAME: Ramona Lira  AGE: 43 y.o. SEX: female  : 1980     DATE: 2024     Assessment and Plan:     Problem List Items Addressed This Visit       Type 2 diabetes mellitus without complication, without long-term current use of insulin (HCC)    HbA1c 7.6, improved from 8  Patient having hypoglycemic symptoms with glimepiride  Will d/c glymepiride  Continue Ozempic 2mg/dose  F/u in 3 months with HbA1c   Needs foot exam next visit  Ref to ophthalmology placed for DM eye check up    Relevant Orders    Hemoglobin A1C    Tobacco dependence    Smokes 20 cigarettes a day  Counseled on tobacco cessation  Has tried chantix in the past with no improvement  Will try nicotine patch     Relevant Medications    nicotine (NICODERM CQ) 21 mg/24 hr TD 24 hr patch     Other Visit Diagnoses       Annual physical exam    -  Primary    BMI 32.0-32.9,adult     Counseled on healthy diet and exercise  Continue Ozempic  Patient losing weight and has been exercising regularly        Blurry vision, left eye        Patient complaints of blurry left eye  Will ref to opthalmology to also get DM eye check up    Relevant Orders    Ambulatory Referral to Ophthalmology            Immunizations and preventive care screenings were discussed with patient today. Appropriate education was printed on patient's after visit summary.    Counseling:  Alcohol/drug use: discussed moderation in alcohol intake, the recommendations for healthy alcohol use, and avoidance of illicit drug use.  Dental Health: discussed importance of regular tooth brushing, flossing, and dental visits.  Sexual health: discussed sexually transmitted diseases, partner selection, use of condoms, avoidance of unintended pregnancy, and contraceptive alternatives.  Exercise: the importance of regular exercise/physical activity was discussed. Recommend exercise 3-5 times per week for at  least 30 minutes.       Depression Screening and Follow-up Plan: Patient was screened for depression during today's encounter. They screened negative with a PHQ-2 score of 0.    Tobacco Cessation Counseling: Tobacco cessation counseling was provided. The patient is sincerely urged to quit consumption of tobacco. She is not ready to quit tobacco. Medication options discussed.         Return in 3 months (on 4/30/2024) for Recheck DM.     Chief Complaint:     Chief Complaint   Patient presents with    Follow-up     Follow up on labs and pap      History of Present Illness:     Adult Annual Physical   Patient here for a comprehensive physical exam. The patient reports no problems.    Diet and Physical Activity  Diet/Nutrition: well balanced diet, limited junk food, and limited fruits/vegetables.   Exercise: moderate cardiovascular exercise, 5-7 times a week on average, and 30-60 minutes on average.      Depression Screening  PHQ-2/9 Depression Screening    Little interest or pleasure in doing things: 0 - not at all  Feeling down, depressed, or hopeless: 0 - not at all  PHQ-2 Score: 0  PHQ-2 Interpretation: Negative depression screen       General Health  Sleep: sleeps well and gets 7-8 hours of sleep on average.   Hearing: decreased - bilateral.  Vision: vision problems: blurry vision left eye and most recent eye exam >1 year ago.   Dental: regular dental visits, brushes teeth twice daily, and flosses teeth occasionally.       /GYN Health  Follows with gynecology? yes   Patient is: premenopausal  Last menstrual period: 1/27/24         Review of Systems:     Review of Systems   Constitutional:  Negative for chills and fever.   HENT:  Negative for ear pain and sore throat.    Eyes:  Negative for pain and visual disturbance.   Respiratory:  Negative for cough and shortness of breath.    Cardiovascular:  Negative for chest pain and palpitations.   Gastrointestinal:  Negative for abdominal pain and vomiting.    Genitourinary:  Negative for dysuria and hematuria.   Musculoskeletal:  Negative for arthralgias and back pain.   Skin:  Negative for color change and rash.   Neurological:  Negative for seizures and syncope.   All other systems reviewed and are negative.     Past Medical History:     Past Medical History:   Diagnosis Date    Asthma     Diabetes mellitus (HCC)       Past Surgical History:     Past Surgical History:   Procedure Laterality Date    ADENOIDECTOMY       SECTION      COSMETIC SURGERY      stomach- lipo and fat transfer    FOOT FRACTURE SURGERY      TYMPANOSTOMY TUBE PLACEMENT      fell out      Social History:     Social History     Socioeconomic History    Marital status: Single     Spouse name: None    Number of children: None    Years of education: None    Highest education level: GED or equivalent   Occupational History     Comment:    Tobacco Use    Smoking status: Every Day     Current packs/day: 1.00     Average packs/day: 1 pack/day for 15.0 years (15.0 ttl pk-yrs)     Types: Cigarettes    Smokeless tobacco: Never   Vaping Use    Vaping status: Never Used   Substance and Sexual Activity    Alcohol use: Not Currently    Drug use: Never    Sexual activity: Yes     Partners: Male   Other Topics Concern    None   Social History Narrative    Lives in house with  and 2 kids     Social Determinants of Health     Financial Resource Strain: Not on file   Food Insecurity: Not on file   Transportation Needs: Not on file   Physical Activity: Not on file   Stress: Not on file   Social Connections: Not on file   Intimate Partner Violence: Not on file   Housing Stability: Not on file      Family History:     Family History   Problem Relation Age of Onset    Hypertension Mother     Diabetes Mother     Stomach cancer Father     Colon cancer Father     Diabetes Father     Lung cancer Maternal Grandmother     No Known Problems Maternal Grandfather     No Known Problems Paternal  Grandmother     No Known Problems Paternal Grandfather     No Known Problems Half-Brother     No Known Problems Half-Brother     Stomach cancer Cousin     Hypertension Maternal Aunt     Diabetes Maternal Aunt     Hypertension Maternal Aunt     No Known Problems Paternal Aunt     Diabetes Paternal Aunt     No Known Problems Paternal Aunt     No Known Problems Paternal Aunt       Current Medications:     Current Outpatient Medications   Medication Sig Dispense Refill    albuterol (2.5 mg/3 mL) 0.083 % nebulizer solution Take 3 mL (2.5 mg total) by nebulization every 6 (six) hours as needed for wheezing or shortness of breath 75 mL 0    albuterol (PROVENTIL HFA,VENTOLIN HFA) 90 mcg/act inhaler Inhale 1-2 puffs every 4 (four) hours as needed for shortness of breath or wheezing 8 g 0    Blood Glucose Monitoring Suppl (OneTouch Verio Reflect) w/Device KIT Check blood sugars once daily. Please substitute with appropriate alternative as covered by patient's insurance. Dx: E11.65 1 kit 0    Continuous Blood Gluc Sensor (FreeStyle Richelle 3 Sensor) MISC       Diclofenac Sodium (VOLTAREN) 1 % Apply 2 g topically 4 (four) times a day 100 g 1    glucose blood (OneTouch Verio) test strip Check blood sugars once daily. Please substitute with appropriate alternative as covered by patient's insurance. Dx: E11.65 100 each 3    halobetasol (ULTRAVATE) 0.05 % cream Apply topically 2 (two) times a day 50 g 0    lidocaine (Lidoderm) 5 % Apply 1 patch topically over 12 hours daily Remove & Discard patch within 12 hours or as directed by MD 30 patch 2    nicotine (NICODERM CQ) 21 mg/24 hr TD 24 hr patch Place 1 patch on the skin over 24 hours every 24 hours 28 patch 0    NovoFine Plus Pen Needle 32G X 4 MM MISC USE TO INJECT MEDICATION UNDER THE SKIN ONCE WEEKLY FOR 30 DAYS      ondansetron (ZOFRAN-ODT) 4 mg disintegrating tablet Take 1 tablet (4 mg total) by mouth every 6 (six) hours as needed for vomiting or nausea 12 tablet 0    OneTouch  "Delica Lancets 33G MISC Check blood sugars once daily. Please substitute with appropriate alternative as covered by patient's insurance. Dx: E11.65 100 each 3    semaglutide, 2 mg/dose, (Ozempic) 8 mg/ mL injection pen Inject 0.75 mL (2 mg total) under the skin every 7 days 9 mL 1    Respiratory Therapy Supplies (Nebulizer) KAMINI Use as needed (Shortness of breath, wheezing) (Patient not taking: Reported on 9/21/2022) 1 each 0     No current facility-administered medications for this visit.      Allergies:     No Known Allergies   Physical Exam:     /70   Pulse 100   Temp (!) 96 °F (35.6 °C)   Ht 5' 4\" (1.626 m)   Wt 86.6 kg (191 lb)   SpO2 100%   BMI 32.79 kg/m²     Physical Exam  Vitals and nursing note reviewed.   Constitutional:       General: She is not in acute distress.     Appearance: She is well-developed.   HENT:      Head: Normocephalic and atraumatic.   Eyes:      Conjunctiva/sclera: Conjunctivae normal.   Cardiovascular:      Rate and Rhythm: Normal rate and regular rhythm.      Heart sounds: No murmur heard.  Pulmonary:      Effort: Pulmonary effort is normal. No respiratory distress.      Breath sounds: Normal breath sounds.   Abdominal:      Palpations: Abdomen is soft.      Tenderness: There is no abdominal tenderness.   Musculoskeletal:         General: No swelling.      Cervical back: Neck supple.   Skin:     General: Skin is warm and dry.      Capillary Refill: Capillary refill takes less than 2 seconds.   Neurological:      Mental Status: She is alert.   Psychiatric:         Mood and Affect: Mood normal.          Clementina Tamez MD  [facility]    "

## 2024-03-05 ENCOUNTER — OFFICE VISIT (OUTPATIENT)
Dept: FAMILY MEDICINE CLINIC | Facility: CLINIC | Age: 44
End: 2024-03-05
Payer: COMMERCIAL

## 2024-03-05 VITALS
TEMPERATURE: 97.4 F | OXYGEN SATURATION: 98 % | HEIGHT: 64 IN | SYSTOLIC BLOOD PRESSURE: 128 MMHG | DIASTOLIC BLOOD PRESSURE: 76 MMHG | WEIGHT: 197.38 LBS | HEART RATE: 72 BPM | BODY MASS INDEX: 33.7 KG/M2

## 2024-03-05 DIAGNOSIS — E11.9 TYPE 2 DIABETES MELLITUS WITHOUT COMPLICATION, WITHOUT LONG-TERM CURRENT USE OF INSULIN (HCC): ICD-10-CM

## 2024-03-05 DIAGNOSIS — Z12.12 SCREENING FOR COLORECTAL CANCER: ICD-10-CM

## 2024-03-05 DIAGNOSIS — M21.612 BUNION, LEFT FOOT: ICD-10-CM

## 2024-03-05 DIAGNOSIS — M79.3 PANNICULITIS: ICD-10-CM

## 2024-03-05 DIAGNOSIS — M25.572 ACUTE LEFT ANKLE PAIN: Primary | ICD-10-CM

## 2024-03-05 DIAGNOSIS — Z12.11 SCREENING FOR COLORECTAL CANCER: ICD-10-CM

## 2024-03-05 DIAGNOSIS — Z12.31 ENCOUNTER FOR SCREENING MAMMOGRAM FOR MALIGNANT NEOPLASM OF BREAST: ICD-10-CM

## 2024-03-05 LAB — SL AMB POCT HEMOGLOBIN AIC: 7.2 (ref ?–6.5)

## 2024-03-05 PROCEDURE — 83036 HEMOGLOBIN GLYCOSYLATED A1C: CPT

## 2024-03-05 PROCEDURE — 99214 OFFICE O/P EST MOD 30 MIN: CPT

## 2024-03-05 NOTE — PROGRESS NOTES
Assessment/Plan     Diagnoses and all orders for this visit:    Acute left ankle pain  Comments:  Patient reports left ankle pain since this a.m.  Denies history of trauma/injury  ROM wnl  Suspect tendinitis  Conservative treatment for now with Tylenol  RTC if no improvement, consider x-ray at that point    Type 2 diabetes mellitus without complication, without long-term current use of insulin (HCC)  Comments:  POCT A1c 7.2 improved from 7.6 last visit  Continue Ozempic 2 mg per dose  We will add metformin tapered from 500 to 2000 mg daily  DM Foot exam normal  Orders:  -     Diabetic foot exam; Future  -     Albumin / creatinine urine ratio; Future  -     semaglutide, 2 mg/dose, (Ozempic) 8 mg/ mL injection pen; Inject 0.75 mL (2 mg total) under the skin every 7 days  -     POCT hemoglobin A1c  -     metFORMIN (GLUCOPHAGE) 500 mg tablet; Take 1 tablet (500 mg total) by mouth daily with breakfast for 7 days, THEN 1 tablet (500 mg total) 2 (two) times a day with meals for 7 days, THEN 2 tablets (1,000 mg total) 2 (two) times a day with meals.  -     Ambulatory Referral to Podiatry; Future    Bunion, left foot  -     Ambulatory Referral to Podiatry; Future    Panniculitis  Comments:  Patient reports that she will be having paniculectomy in [place]  Preop scheduled in a few weeks  Patient states that she will need tube removal post op within [place]  Referral to general surgery placed  Orders:  -     Ambulatory Referral to General Surgery; Future    Screening for colorectal cancer  -     Ambulatory Referral to Gastroenterology; Future    Encounter for screening mammogram for malignant neoplasm of breast  -     Mammo screening bilateral w 3d & cad; Future        Subjective     Patient Id: Ramona Lira is a 43 y.o. female    Patient is a 43-year-old female who presents to the office today for left ankle pain.  Patient states that she developed this pain this morning with radiation from left ankle to the calf.  Patient denies any  trauma or injury to the left ankle.  Patient states that she has had tendon reconstructed in the left ankle several years ago.  Patient also reports bunion in her left toes which has been causing pain.  Patient states that she will be undergoing panniculectomy in April in [place].  Patient states that she will need her tubes removed postprocedure in [place].  Patient reports that she has a preop appointment scheduled in a few weeks.  Patient otherwise denies compliance with her medications.  No other concerns this visit.        Review of Systems   Constitutional:  Negative for chills and fever.   HENT:  Negative for ear pain and sore throat.    Eyes:  Negative for pain and visual disturbance.   Respiratory:  Negative for cough and shortness of breath.    Cardiovascular:  Negative for chest pain and palpitations.   Gastrointestinal:  Negative for abdominal pain and vomiting.   Genitourinary:  Negative for dysuria and hematuria.   Musculoskeletal:  Positive for arthralgias. Negative for back pain.   Skin:  Negative for color change and rash.   Neurological:  Negative for seizures and syncope.   All other systems reviewed and are negative.      Past Medical History:   Diagnosis Date    Asthma     Diabetes mellitus (HCC)        Past Surgical History:   Procedure Laterality Date    ADENOIDECTOMY       SECTION      COSMETIC SURGERY      stomach- lipo and fat transfer    FOOT FRACTURE SURGERY      TYMPANOSTOMY TUBE PLACEMENT      fell out       Family History   Problem Relation Age of Onset    Hypertension Mother     Diabetes Mother     Stomach cancer Father     Colon cancer Father     Diabetes Father     Lung cancer Maternal Grandmother     No Known Problems Maternal Grandfather     No Known Problems Paternal Grandmother     No Known Problems Paternal Grandfather     No Known Problems Half-Brother     No Known Problems Half-Brother     Stomach cancer Cousin     Hypertension Maternal Aunt     Diabetes Maternal  Aunt     Hypertension Maternal Aunt     No Known Problems Paternal Aunt     Diabetes Paternal Aunt     No Known Problems Paternal Aunt     No Known Problems Paternal Aunt        Social History     Socioeconomic History    Marital status: Single     Spouse name: Not on file    Number of children: Not on file    Years of education: Not on file    Highest education level: GED or equivalent   Occupational History     Comment:    Tobacco Use    Smoking status: Every Day     Current packs/day: 1.00     Average packs/day: 1 pack/day for 15.0 years (15.0 ttl pk-yrs)     Types: Cigarettes    Smokeless tobacco: Never   Vaping Use    Vaping status: Never Used   Substance and Sexual Activity    Alcohol use: Not Currently    Drug use: Never    Sexual activity: Yes     Partners: Male   Other Topics Concern    Not on file   Social History Narrative    Lives in house with  and 2 kids     Social Determinants of Health     Financial Resource Strain: Not on file   Food Insecurity: Not on file   Transportation Needs: Not on file   Physical Activity: Not on file   Stress: Not on file   Social Connections: Not on file   Intimate Partner Violence: Not on file   Housing Stability: Not on file       No Known Allergies      Current Outpatient Medications:     albuterol (2.5 mg/3 mL) 0.083 % nebulizer solution, Take 3 mL (2.5 mg total) by nebulization every 6 (six) hours as needed for wheezing or shortness of breath, Disp: 75 mL, Rfl: 0    albuterol (PROVENTIL HFA,VENTOLIN HFA) 90 mcg/act inhaler, Inhale 1-2 puffs every 4 (four) hours as needed for shortness of breath or wheezing, Disp: 8 g, Rfl: 0    Blood Glucose Monitoring Suppl (OneTouch Verio Reflect) w/Device KIT, Check blood sugars once daily. Please substitute with appropriate alternative as covered by patient's insurance. Dx: E11.65, Disp: 1 kit, Rfl: 0    Continuous Blood Gluc Sensor (FreeStyle Richelle 3 Sensor) MISC, , Disp: , Rfl:     glucose blood (OneTouch  Verio) test strip, Check blood sugars once daily. Please substitute with appropriate alternative as covered by patient's insurance. Dx: E11.65, Disp: 100 each, Rfl: 3    halobetasol (ULTRAVATE) 0.05 % cream, Apply topically 2 (two) times a day, Disp: 50 g, Rfl: 0    metFORMIN (GLUCOPHAGE) 500 mg tablet, Take 1 tablet (500 mg total) by mouth daily with breakfast for 7 days, THEN 1 tablet (500 mg total) 2 (two) times a day with meals for 7 days, THEN 2 tablets (1,000 mg total) 2 (two) times a day with meals., Disp: 381 tablet, Rfl: 0    nicotine (NICODERM CQ) 21 mg/24 hr TD 24 hr patch, Place 1 patch on the skin over 24 hours every 24 hours, Disp: 28 patch, Rfl: 0    NovoFine Plus Pen Needle 32G X 4 MM MISC, USE TO INJECT MEDICATION UNDER THE SKIN ONCE WEEKLY FOR 30 DAYS, Disp: , Rfl:     OneTouch Delica Lancets 33G MISC, Check blood sugars once daily. Please substitute with appropriate alternative as covered by patient's insurance. Dx: E11.65, Disp: 100 each, Rfl: 3    semaglutide, 2 mg/dose, (Ozempic) 8 mg/ mL injection pen, Inject 0.75 mL (2 mg total) under the skin every 7 days, Disp: 9 mL, Rfl: 1    Diclofenac Sodium (VOLTAREN) 1 %, Apply 2 g topically 4 (four) times a day (Patient not taking: Reported on 3/5/2024), Disp: 100 g, Rfl: 1    lidocaine (Lidoderm) 5 %, Apply 1 patch topically over 12 hours daily Remove & Discard patch within 12 hours or as directed by MD (Patient not taking: Reported on 3/5/2024), Disp: 30 patch, Rfl: 2    ondansetron (ZOFRAN-ODT) 4 mg disintegrating tablet, Take 1 tablet (4 mg total) by mouth every 6 (six) hours as needed for vomiting or nausea (Patient not taking: Reported on 3/5/2024), Disp: 12 tablet, Rfl: 0    Respiratory Therapy Supplies (Nebulizer) KAMINI, Use as needed (Shortness of breath, wheezing) (Patient not taking: Reported on 9/21/2022), Disp: 1 each, Rfl: 0    Objective     Vitals:    03/05/24 1521   BP: 128/76   Pulse: 72   Temp: (!) 97.4 °F (36.3 °C)   SpO2: 98%  "  Weight: 89.5 kg (197 lb 6 oz)   Height: 5' 4\" (1.626 m)       Physical Exam  Vitals and nursing note reviewed.   Constitutional:       General: She is not in acute distress.     Appearance: She is well-developed.   HENT:      Head: Normocephalic and atraumatic.   Eyes:      Conjunctiva/sclera: Conjunctivae normal.   Cardiovascular:      Rate and Rhythm: Normal rate and regular rhythm.      Pulses: no weak pulses.           Dorsalis pedis pulses are 2+ on the right side and 2+ on the left side.        Posterior tibial pulses are 2+ on the right side and 2+ on the left side.      Heart sounds: No murmur heard.  Pulmonary:      Effort: Pulmonary effort is normal. No respiratory distress.      Breath sounds: Normal breath sounds.   Abdominal:      Palpations: Abdomen is soft.      Tenderness: There is no abdominal tenderness.   Musculoskeletal:         General: No swelling. Normal range of motion.      Cervical back: Neck supple.      Comments: Tenderness in the left ankle.  Range of motion intact.  Bunion in the left foot noted.   Feet:      Right foot:      Skin integrity: No ulcer, skin breakdown, erythema, warmth, callus or dry skin.      Left foot:      Skin integrity: No ulcer, skin breakdown, erythema, warmth, callus or dry skin.   Skin:     General: Skin is warm and dry.      Capillary Refill: Capillary refill takes less than 2 seconds.   Neurological:      Mental Status: She is alert and oriented to person, place, and time.   Psychiatric:         Mood and Affect: Mood normal.     Diabetic Foot Exam    Patient's shoes and socks removed.    Right Foot/Ankle   Right Foot Inspection  Skin Exam: skin normal and skin intact. No dry skin, no warmth, no callus, no erythema, no maceration, no abnormal color, no pre-ulcer, no ulcer and no callus.     Toe Exam: ROM and strength within normal limits.     Sensory   Monofilament testing: intact    Vascular  Capillary refills: < 3 seconds  The right DP pulse is 2+. The " right PT pulse is 2+.     Left Foot/Ankle  Left Foot Inspection  Skin Exam: skin normal and skin intact. No dry skin, no warmth, no erythema, no maceration, normal color, no pre-ulcer, no ulcer and no callus.     Toe Exam: ROM and strength within normal limits.     Sensory   Monofilament testing: intact    Vascular  Capillary refills: < 3 seconds  The left DP pulse is 2+. The left PT pulse is 2+.     Assign Risk Category  No deformity present  No loss of protective sensation  No weak pulses  Risk: 0      Clementina Tamez MD  Family Medicine

## 2024-03-13 ENCOUNTER — RA CDI HCC (OUTPATIENT)
Dept: OTHER | Facility: HOSPITAL | Age: 44
End: 2024-03-13

## 2024-03-13 NOTE — PROGRESS NOTES
HCC coding opportunities       Chart reviewed, no opportunity found: CHART REVIEWED, NO OPPORTUNITY FOUND      This is a reminder to address (resolve/update/assess) ALL HCC (risk adjustment) codes as found on active problem list for 2024 as patient scores reset to zero KAREN.  Also, just a reminder to please review and assess all other chronic conditions for 2024  Patients Insurance        Commercial Insurance: [employer]

## 2024-03-19 ENCOUNTER — CONSULT (OUTPATIENT)
Dept: FAMILY MEDICINE CLINIC | Facility: CLINIC | Age: 44
End: 2024-03-19
Payer: COMMERCIAL

## 2024-03-19 VITALS
TEMPERATURE: 97.9 F | HEIGHT: 64 IN | WEIGHT: 192.4 LBS | OXYGEN SATURATION: 98 % | DIASTOLIC BLOOD PRESSURE: 76 MMHG | BODY MASS INDEX: 32.85 KG/M2 | SYSTOLIC BLOOD PRESSURE: 116 MMHG | HEART RATE: 98 BPM

## 2024-03-19 DIAGNOSIS — Z01.818 PREOP EXAMINATION: Primary | ICD-10-CM

## 2024-03-19 DIAGNOSIS — Z41.9 ELECTIVE SURGERY: ICD-10-CM

## 2024-03-19 PROCEDURE — 93000 ELECTROCARDIOGRAM COMPLETE: CPT

## 2024-03-19 PROCEDURE — 99243 OFF/OP CNSLTJ NEW/EST LOW 30: CPT

## 2024-03-19 NOTE — PROGRESS NOTES
"PRE-OPERATIVE EXAMINATION  Ramona Lira  1980    Ramona Lira is a 43 y.o. female with PMH of type 2 diabetes mellitus who is planning to undergo abdominoplasty with 2 areas of lipo (weight flanks ) under general by Dr. Bubba Mensah on 4/19/24. The procedure is indicated for the following condition: cosmetic procedure for abdominal fat. Patient has not had complications with anesthesia in the past.    ROS:   Chest pain: no   Shortness of breath: no  Shortness of breath with exertion: no  Orthopnea: no  Dizziness: no  Unexplained weight change: no    PMH:  CAD: no  HTN: no  CKD: no  DM: yes on insulin: no  History of CVA: no    She  reports that she has been smoking cigarettes. She has a 15 pack-year smoking history. She has never used smokeless tobacco. She reports that she does not currently use alcohol. She reports that she does not use drugs.  Patient was on nicotine patch 7 days ago, not currently on it.  She states that she has not been smoking since 1 week.    /76   Pulse 98   Temp 97.9 °F (36.6 °C) (Tympanic)   Ht 5' 4\" (1.626 m)   Wt 87.3 kg (192 lb 6.4 oz)   LMP 03/19/2024 (Exact Date)   SpO2 98%   BMI 33.03 kg/m²   Physical Exam  Vitals and nursing note reviewed.   Constitutional:       General: She is not in acute distress.     Appearance: She is well-developed.   HENT:      Head: Normocephalic and atraumatic.   Eyes:      Conjunctiva/sclera: Conjunctivae normal.   Cardiovascular:      Rate and Rhythm: Normal rate and regular rhythm.      Heart sounds: No murmur heard.  Pulmonary:      Effort: Pulmonary effort is normal. No respiratory distress.      Breath sounds: Normal breath sounds.   Abdominal:      Palpations: Abdomen is soft.      Tenderness: There is no abdominal tenderness.   Musculoskeletal:         General: No swelling.      Cervical back: Neck supple.      Right lower leg: No edema.      Left lower leg: No edema.   Skin:     General: Skin is warm and dry.      Capillary " Refill: Capillary refill takes less than 2 seconds.   Neurological:      Mental Status: She is alert and oriented to person, place, and time.   Psychiatric:         Mood and Affect: Mood normal.         Revised Cardiac Risk Index (RCRI) for Pre-Operative Risk   (estimates risk of cardiac complications after noncardiac surgery)    High-risk surgery: No 0 / Yes +1  Intraperitoneal, intrathoracic, suprainguinal vascular  History of ischemic heart disease: No 0 / Yes +1  Hx of MI, (+) exercise test, current chest pain considered due to myocardial ischemia, use of nitrate therapy or ECG with pathological Q waves)  History of CHF: No 0 / Yes +1  Pulmonary edema, B/L rales or S3 gallop; DE LEON, orthopnea, PND, CXR showing pulmonary vascular redistribution)  History of cerebrovascular disease: No 0 / Yes +1  Prior TIA or stroke  Pre-operative treatment with insulin: No 0 / Yes +1  Pre-operative creatinine >2 mg/dL: No 0 / Yes +1    RCRI Scorin points: Class I Risk, 3.9% 30-day risk of death, MI, or cardiac arrest  1 point: Class II Risk, 6.0% 30-day risk of death, MI, or cardiac arrest  2 points: Class III Risk, 10.1% 30-day risk of death, MI, or cardiac arrest  3 points: Class IV Risk, 15% 30-day risk of death, MI, or cardiac arrest  4 points: Class IV Risk, 15% 30-day risk of death, MI, or cardiac arrest  5 points: Class IV Risk, 15% 30-day risk of death, MI, or cardiac arrest  6 points: Class IV Risk, 15% 30-day risk of death, MI, or cardiac arrest    Lab Results   Component Value Date    CREATININE 0.82 2023       Ramona was seen today for pre-op exam.    Diagnoses and all orders for this visit:    Preop examination  Comments:  -Patient is getting abdominoplasty with liposuction under general anesthesia in [place]  -Per review of her paperwork, labs and imaging ordered per surgeon request  -Latest A1c 7.2  -EKG normal  -She may proceed with the surgery pending lab review  -Advised patient that we cannot give her  FMLA as this is a cosmetic procedure  -Referral to general surgery within the area placed last visit for possible postop care as patient is getting the surgery in [place]  Orders:  -     POCT ECG  -     CBC and differential; Future  -     Comprehensive metabolic panel; Future  -     HIV 1/2 AG/AB w Reflex [facility] for 2 yr old and above; Future  -     Protime-INR; Future  -     APTT; Future  -     Iron Panel (Includes Ferritin, Iron Sat%, Iron, and TIBC); Future  -     Urinalysis with microscopic; Future  -     hCG, quantitative; Future  -     XR chest pa & lateral; Future    Elective Surgery- Tummy Tuck      Recommendations:  Ramona Lira is undergoing an elective Low Risk surgery, abdominoplasty with 2 areas of lipo. She is RCRI class I risk (0 points) with 3.9% 30-day risk of death, MI, or cardiac arrest. She may proceed with surgery as planned with further workup. Pre-operative form completed and faxed today to office as requested.    Discussed with Dr. Daryl Amaral, who is in agreement with the plan as outlined above.

## 2024-03-25 ENCOUNTER — HOSPITAL ENCOUNTER (OUTPATIENT)
Dept: RADIOLOGY | Facility: HOSPITAL | Age: 44
Discharge: HOME | End: 2024-03-25
Payer: COMMERCIAL

## 2024-03-25 ENCOUNTER — APPOINTMENT (OUTPATIENT)
Dept: LAB | Facility: HOSPITAL | Age: 44
End: 2024-03-25
Payer: COMMERCIAL

## 2024-03-25 DIAGNOSIS — E11.9 TYPE 2 DIABETES MELLITUS WITHOUT COMPLICATION, WITHOUT LONG-TERM CURRENT USE OF INSULIN (HCC): ICD-10-CM

## 2024-03-25 DIAGNOSIS — Z01.818 PREOP EXAMINATION: ICD-10-CM

## 2024-03-25 LAB
ALBUMIN SERPL BCP-MCNC: 4 G/DL (ref 3.5–5)
ALP SERPL-CCNC: 66 U/L (ref 34–104)
ALT SERPL W P-5'-P-CCNC: 18 U/L (ref 7–52)
ANION GAP SERPL CALCULATED.3IONS-SCNC: 10 MMOL/L (ref 4–13)
APTT PPP: 26 SECONDS (ref 23–37)
AST SERPL W P-5'-P-CCNC: 15 U/L (ref 13–39)
B-HCG SERPL-ACNC: <1 MIU/ML (ref 0–5)
BACTERIA UR QL AUTO: ABNORMAL /HPF
BASOPHILS # BLD AUTO: 0.07 THOUSANDS/ÂΜL (ref 0–0.1)
BASOPHILS NFR BLD AUTO: 1 % (ref 0–1)
BILIRUB SERPL-MCNC: 0.48 MG/DL (ref 0.2–1)
BILIRUB UR QL STRIP: NEGATIVE
BUN SERPL-MCNC: 14 MG/DL (ref 5–25)
CALCIUM SERPL-MCNC: 9.2 MG/DL (ref 8.4–10.2)
CHLORIDE SERPL-SCNC: 106 MMOL/L (ref 96–108)
CLARITY UR: CLEAR
CO2 SERPL-SCNC: 23 MMOL/L (ref 21–32)
COLOR UR: YELLOW
CREAT SERPL-MCNC: 0.87 MG/DL (ref 0.6–1.3)
CREAT UR-MCNC: 132.2 MG/DL
EOSINOPHIL # BLD AUTO: 0.37 THOUSAND/ÂΜL (ref 0–0.61)
EOSINOPHIL NFR BLD AUTO: 3 % (ref 0–6)
ERYTHROCYTE [DISTWIDTH] IN BLOOD BY AUTOMATED COUNT: 12.8 % (ref 11.6–15.1)
FERRITIN SERPL-MCNC: 41 NG/ML (ref 11–307)
GFR SERPL CREATININE-BSD FRML MDRD: 80 ML/MIN/1.73SQ M
GLUCOSE P FAST SERPL-MCNC: 182 MG/DL (ref 65–99)
GLUCOSE UR STRIP-MCNC: NEGATIVE MG/DL
HCT VFR BLD AUTO: 42.2 % (ref 34.8–46.1)
HGB BLD-MCNC: 14.1 G/DL (ref 11.5–15.4)
HGB UR QL STRIP.AUTO: ABNORMAL
HIV 1+2 AB+HIV1 P24 AG SERPL QL IA: NORMAL
HIV 2 AB SERPL QL IA: NORMAL
HIV1 AB SERPL QL IA: NORMAL
HIV1 P24 AG SERPL QL IA: NORMAL
IMM GRANULOCYTES # BLD AUTO: 0.03 THOUSAND/UL (ref 0–0.2)
IMM GRANULOCYTES NFR BLD AUTO: 0 % (ref 0–2)
INR PPP: 0.98 (ref 0.84–1.19)
IRON SATN MFR SERPL: 12 % (ref 15–50)
IRON SERPL-MCNC: 38 UG/DL (ref 50–212)
KETONES UR STRIP-MCNC: NEGATIVE MG/DL
LEUKOCYTE ESTERASE UR QL STRIP: NEGATIVE
LYMPHOCYTES # BLD AUTO: 2.42 THOUSANDS/ÂΜL (ref 0.6–4.47)
LYMPHOCYTES NFR BLD AUTO: 20 % (ref 14–44)
MCH RBC QN AUTO: 29.4 PG (ref 26.8–34.3)
MCHC RBC AUTO-ENTMCNC: 33.4 G/DL (ref 31.4–37.4)
MCV RBC AUTO: 88 FL (ref 82–98)
MICROALBUMIN UR-MCNC: 16 MG/L
MICROALBUMIN/CREAT 24H UR: 12 MG/G CREATININE (ref 0–30)
MONOCYTES # BLD AUTO: 0.83 THOUSAND/ÂΜL (ref 0.17–1.22)
MONOCYTES NFR BLD AUTO: 7 % (ref 4–12)
NEUTROPHILS # BLD AUTO: 8.15 THOUSANDS/ÂΜL (ref 1.85–7.62)
NEUTS SEG NFR BLD AUTO: 69 % (ref 43–75)
NITRITE UR QL STRIP: NEGATIVE
NON-SQ EPI CELLS URNS QL MICRO: ABNORMAL /HPF
NRBC BLD AUTO-RTO: 0 /100 WBCS
PH UR STRIP.AUTO: 6 [PH]
PLATELET # BLD AUTO: 280 THOUSANDS/UL (ref 149–390)
PMV BLD AUTO: 9.8 FL (ref 8.9–12.7)
POTASSIUM SERPL-SCNC: 3.9 MMOL/L (ref 3.5–5.3)
PROT SERPL-MCNC: 6.8 G/DL (ref 6.4–8.4)
PROT UR STRIP-MCNC: NEGATIVE MG/DL
PROTHROMBIN TIME: 12.9 SECONDS (ref 11.6–14.5)
RBC # BLD AUTO: 4.79 MILLION/UL (ref 3.81–5.12)
RBC #/AREA URNS AUTO: ABNORMAL /HPF
SODIUM SERPL-SCNC: 139 MMOL/L (ref 135–147)
SP GR UR STRIP.AUTO: 1.02 (ref 1–1.03)
TIBC SERPL-MCNC: 307 UG/DL (ref 250–450)
UIBC SERPL-MCNC: 269 UG/DL (ref 155–355)
UROBILINOGEN UR QL STRIP.AUTO: 0.2 E.U./DL
WBC # BLD AUTO: 11.87 THOUSAND/UL (ref 4.31–10.16)
WBC #/AREA URNS AUTO: ABNORMAL /HPF

## 2024-03-25 PROCEDURE — 36415 COLL VENOUS BLD VENIPUNCTURE: CPT

## 2024-03-25 PROCEDURE — 82043 UR ALBUMIN QUANTITATIVE: CPT

## 2024-03-25 PROCEDURE — 85610 PROTHROMBIN TIME: CPT

## 2024-03-25 PROCEDURE — 87389 HIV-1 AG W/HIV-1&-2 AB AG IA: CPT

## 2024-03-25 PROCEDURE — 83540 ASSAY OF IRON: CPT

## 2024-03-25 PROCEDURE — 80053 COMPREHEN METABOLIC PANEL: CPT

## 2024-03-25 PROCEDURE — 85025 COMPLETE CBC W/AUTO DIFF WBC: CPT

## 2024-03-25 PROCEDURE — 82570 ASSAY OF URINE CREATININE: CPT

## 2024-03-25 PROCEDURE — 85730 THROMBOPLASTIN TIME PARTIAL: CPT

## 2024-03-25 PROCEDURE — 83550 IRON BINDING TEST: CPT

## 2024-03-25 PROCEDURE — 81001 URINALYSIS AUTO W/SCOPE: CPT

## 2024-03-25 PROCEDURE — 71046 X-RAY EXAM CHEST 2 VIEWS: CPT

## 2024-03-25 PROCEDURE — 82728 ASSAY OF FERRITIN: CPT

## 2024-03-25 PROCEDURE — 84702 CHORIONIC GONADOTROPIN TEST: CPT

## 2024-03-26 ENCOUNTER — TELEPHONE (OUTPATIENT)
Dept: FAMILY MEDICINE CLINIC | Facility: CLINIC | Age: 44
End: 2024-03-26

## 2024-03-26 ENCOUNTER — TELEPHONE (OUTPATIENT)
Dept: OTHER | Facility: HOSPITAL | Age: 44
End: 2024-03-26

## 2024-03-26 NOTE — TELEPHONE ENCOUNTER
Called patient and discussed labs in detail.  Labs overall stable.  Advised patient to take over-the-counter iron supplements.  Patient reports that she was menstruating when giving her UA which explains the large blood in her UA.  Labs otherwise stable.

## 2024-03-26 NOTE — TELEPHONE ENCOUNTER
Called patient back and discussed labs in detail.  Patient reports that she was menstruating when giving her UA, which explains large blood in her UA.

## 2024-03-26 NOTE — TELEPHONE ENCOUNTER
Pt called back requesting to only speak with you. I advised her you are currently in a room with a patient.

## 2024-03-27 NOTE — PROGRESS NOTES
"PRE-OPERATIVE EXAMINATION  Ramona Lira  1980    Ramona Lira is a 43 y.o. female with PMH of type 2 diabetes mellitus who is planning to undergo abdominoplasty with 2 areas of lipo (weight flanks ) under general by Dr. Bubba Mensah on 4/19/24. The procedure is indicated for the following condition: cosmetic procedure for abdominal fat. Patient has not had complications with anesthesia in the past.    ROS:   Chest pain: no   Shortness of breath: no  Shortness of breath with exertion: no  Orthopnea: no  Dizziness: no  Unexplained weight change: no    PMH:  CAD: no  HTN: no  CKD: no  DM: yes on insulin: no  History of CVA: no    She  reports that she has been smoking cigarettes. She has a 15 pack-year smoking history. She has never used smokeless tobacco. She reports that she does not currently use alcohol. She reports that she does not use drugs.  Patient was on nicotine patch 7 days ago, not currently on it.  She states that she has not been smoking since 1 week.    /76   Pulse 98   Temp 97.9 °F (36.6 °C) (Tympanic)   Ht 5' 4\" (1.626 m)   Wt 87.3 kg (192 lb 6.4 oz)   LMP 03/19/2024 (Exact Date)   SpO2 98%   BMI 33.03 kg/m²   Physical Exam  Vitals and nursing note reviewed.   Constitutional:       General: She is not in acute distress.     Appearance: She is well-developed.   HENT:      Head: Normocephalic and atraumatic.   Eyes:      Conjunctiva/sclera: Conjunctivae normal.   Cardiovascular:      Rate and Rhythm: Normal rate and regular rhythm.      Heart sounds: No murmur heard.  Pulmonary:      Effort: Pulmonary effort is normal. No respiratory distress.      Breath sounds: Normal breath sounds.   Abdominal:      Palpations: Abdomen is soft.      Tenderness: There is no abdominal tenderness.   Musculoskeletal:         General: No swelling.      Cervical back: Neck supple.      Right lower leg: No edema.      Left lower leg: No edema.   Skin:     General: Skin is warm and dry.      Capillary " Refill: Capillary refill takes less than 2 seconds.   Neurological:      Mental Status: She is alert and oriented to person, place, and time.   Psychiatric:         Mood and Affect: Mood normal.         Revised Cardiac Risk Index (RCRI) for Pre-Operative Risk   (estimates risk of cardiac complications after noncardiac surgery)    High-risk surgery: No 0 / Yes +1  Intraperitoneal, intrathoracic, suprainguinal vascular  History of ischemic heart disease: No 0 / Yes +1  Hx of MI, (+) exercise test, current chest pain considered due to myocardial ischemia, use of nitrate therapy or ECG with pathological Q waves)  History of CHF: No 0 / Yes +1  Pulmonary edema, B/L rales or S3 gallop; DE LEON, orthopnea, PND, CXR showing pulmonary vascular redistribution)  History of cerebrovascular disease: No 0 / Yes +1  Prior TIA or stroke  Pre-operative treatment with insulin: No 0 / Yes +1  Pre-operative creatinine >2 mg/dL: No 0 / Yes +1    RCRI Scorin points: Class I Risk, 3.9% 30-day risk of death, MI, or cardiac arrest  1 point: Class II Risk, 6.0% 30-day risk of death, MI, or cardiac arrest  2 points: Class III Risk, 10.1% 30-day risk of death, MI, or cardiac arrest  3 points: Class IV Risk, 15% 30-day risk of death, MI, or cardiac arrest  4 points: Class IV Risk, 15% 30-day risk of death, MI, or cardiac arrest  5 points: Class IV Risk, 15% 30-day risk of death, MI, or cardiac arrest  6 points: Class IV Risk, 15% 30-day risk of death, MI, or cardiac arrest    Lab Results   Component Value Date    CREATININE 0.87 2024       Ramona was seen today for pre-op exam.    Diagnoses and all orders for this visit:    Preop examination  Comments:  -Patient is getting abdominoplasty with liposuction under general anesthesia in [place]  -Per review of her paperwork, labs and imaging ordered per surgeon request  -Latest A1c 7.2  -EKG normal  -She may proceed with the surgery pending lab review  -Advised patient that we cannot give her  FMLA as this is a cosmetic procedure  -Referral to general surgery within the area placed last visit for possible postop care as patient is getting the surgery in [place]  Orders:  -     POCT ECG  -     CBC and differential; Future  -     Comprehensive metabolic panel; Future  -     HIV 1/2 AG/AB w Reflex [facility] for 2 yr old and above; Future  -     Protime-INR; Future  -     APTT; Future  -     Iron Panel (Includes Ferritin, Iron Sat%, Iron, and TIBC); Future  -     Urinalysis with microscopic; Future  -     hCG, quantitative; Future  -     XR chest pa & lateral; Future    Elective Surgery- Tummy Tuck/Abdominoplasty      Recommendations:  Ramona Lira is undergoing an elective Low Risk surgery, abdominoplasty with 2 areas of lipo. She is RCRI class I risk (0 points) with 3.9% 30-day risk of death, MI, or cardiac arrest. She may proceed with surgery as planned. Labs reviewed. Latest HbA1c 7.2( Per patient, surgeon wanted HbA1c under 7.2), would defer to surgeon. Labs and images otherwise stable. Kidney function stable. Pre-operative form completed and faxed today to office as requested.    Discussed with Dr. Daryl Amaral, who is in agreement with the plan as outlined above.

## 2024-04-22 ENCOUNTER — TELEPHONE (OUTPATIENT)
Dept: FAMILY MEDICINE CLINIC | Facility: CLINIC | Age: 44
End: 2024-04-22

## 2024-04-22 DIAGNOSIS — Z01.818 PREOP EXAMINATION: Primary | ICD-10-CM

## 2024-04-22 NOTE — TELEPHONE ENCOUNTER
Patient has to come back in for another pre op appointment because she had to reschedule her surgery. She wants to know if you can order her labwork again? The labs that she had done previously are outside the 30 day window.

## 2024-04-23 NOTE — PROGRESS NOTES
Patient requesting labs for pre-op clearance. Called patient and left VM asking her to check with the surgeon to see if these need to be redone.

## 2024-04-23 NOTE — TELEPHONE ENCOUNTER
Left VM asking patient to call surgeon to see if these need to be redone. In my opinion the labs are fairly new.

## 2024-04-26 NOTE — TELEPHONE ENCOUNTER
Pt called back and said she spoke with surgeon. They told her she does need to redo the labs so if those could be put in her chart she will get them done. She said she wants to do them before the 30th for her next appt. Also per the surgeon, they said she needs to redo the EKG and the chest XRAY. Thanks
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
## 2024-04-29 ENCOUNTER — OFFICE VISIT (OUTPATIENT)
Dept: OBGYN CLINIC | Facility: OTHER | Age: 44
End: 2024-04-29
Payer: OTHER MISCELLANEOUS

## 2024-04-29 VITALS
WEIGHT: 193 LBS | DIASTOLIC BLOOD PRESSURE: 85 MMHG | HEIGHT: 64 IN | HEART RATE: 76 BPM | BODY MASS INDEX: 32.95 KG/M2 | SYSTOLIC BLOOD PRESSURE: 126 MMHG

## 2024-04-29 DIAGNOSIS — M77.8 TRICEPS TENDINITIS: ICD-10-CM

## 2024-04-29 DIAGNOSIS — M25.521 PAIN IN RIGHT ELBOW: ICD-10-CM

## 2024-04-29 DIAGNOSIS — M77.11 LATERAL EPICONDYLITIS OF RIGHT ELBOW: Primary | ICD-10-CM

## 2024-04-29 PROCEDURE — 99213 OFFICE O/P EST LOW 20 MIN: CPT | Performed by: ORTHOPAEDIC SURGERY

## 2024-04-29 RX ORDER — EMULSION BASE NO.258
1 EMULSION (GRAM) TOPICAL 3 TIMES DAILY PRN
Qty: 50 G | Refills: 2 | Status: SHIPPED | OUTPATIENT
Start: 2024-04-29
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
## 2024-04-30 ENCOUNTER — CONSULT (OUTPATIENT)
Dept: FAMILY MEDICINE CLINIC | Facility: CLINIC | Age: 44
End: 2024-04-30
Payer: COMMERCIAL

## 2024-04-30 VITALS
HEIGHT: 64 IN | BODY MASS INDEX: 33.09 KG/M2 | WEIGHT: 193.8 LBS | OXYGEN SATURATION: 97 % | HEART RATE: 95 BPM | SYSTOLIC BLOOD PRESSURE: 124 MMHG | DIASTOLIC BLOOD PRESSURE: 82 MMHG | TEMPERATURE: 96.4 F

## 2024-04-30 DIAGNOSIS — E11.9 TYPE 2 DIABETES MELLITUS WITHOUT COMPLICATION, WITHOUT LONG-TERM CURRENT USE OF INSULIN (HCC): ICD-10-CM

## 2024-04-30 DIAGNOSIS — R05.9 COUGH: ICD-10-CM

## 2024-04-30 DIAGNOSIS — Z01.818 PRE-OPERATIVE CLEARANCE: Primary | ICD-10-CM

## 2024-04-30 PROCEDURE — 3079F DIAST BP 80-89 MM HG: CPT

## 2024-04-30 PROCEDURE — 3074F SYST BP LT 130 MM HG: CPT

## 2024-04-30 PROCEDURE — 99243 OFF/OP CNSLTJ NEW/EST LOW 30: CPT

## 2024-04-30 PROCEDURE — 93000 ELECTROCARDIOGRAM COMPLETE: CPT

## 2024-04-30 PROCEDURE — 3725F SCREEN DEPRESSION PERFORMED: CPT

## 2024-04-30 RX ORDER — ALBUTEROL SULFATE 2.5 MG/3ML
2.5 SOLUTION RESPIRATORY (INHALATION) EVERY 6 HOURS PRN
Qty: 75 ML | Refills: 1 | Status: SHIPPED | OUTPATIENT
Start: 2024-04-30
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
## 2024-05-02 ENCOUNTER — HOSPITAL ENCOUNTER (OUTPATIENT)
Dept: MAMMOGRAPHY | Facility: HOSPITAL | Age: 44
Discharge: HOME | End: 2024-05-02
Payer: COMMERCIAL

## 2024-05-02 ENCOUNTER — HOSPITAL ENCOUNTER (OUTPATIENT)
Dept: ULTRASOUND IMAGING | Facility: HOSPITAL | Age: 44
Discharge: HOME | End: 2024-05-02
Payer: COMMERCIAL

## 2024-05-02 ENCOUNTER — HOSPITAL ENCOUNTER (OUTPATIENT)
Dept: RADIOLOGY | Facility: HOSPITAL | Age: 44
Discharge: HOME | End: 2024-05-02
Payer: COMMERCIAL

## 2024-05-02 VITALS — BODY MASS INDEX: 32.95 KG/M2 | HEIGHT: 64 IN | WEIGHT: 193 LBS

## 2024-05-02 DIAGNOSIS — Z01.818 PREOP EXAMINATION: ICD-10-CM

## 2024-05-02 DIAGNOSIS — N64.4 BREAST PAIN: ICD-10-CM

## 2024-05-02 PROCEDURE — 77066 DX MAMMO INCL CAD BI: CPT

## 2024-05-02 PROCEDURE — 71046 X-RAY EXAM CHEST 2 VIEWS: CPT

## 2024-05-02 PROCEDURE — G0279 TOMOSYNTHESIS, MAMMO: HCPCS

## 2024-05-02 PROCEDURE — 76642 ULTRASOUND BREAST LIMITED: CPT

## 2024-05-06 ENCOUNTER — HOSPITAL ENCOUNTER (EMERGENCY)
Facility: HOSPITAL | Age: 44
Discharge: HOME | End: 2024-05-06
Attending: EMERGENCY MEDICINE | Admitting: EMERGENCY MEDICINE
Payer: COMMERCIAL

## 2024-05-06 ENCOUNTER — APPOINTMENT (OUTPATIENT)
Dept: LAB | Facility: HOSPITAL | Age: 44
End: 2024-05-06
Payer: COMMERCIAL

## 2024-05-06 VITALS
RESPIRATION RATE: 18 BRPM | OXYGEN SATURATION: 97 % | HEART RATE: 89 BPM | SYSTOLIC BLOOD PRESSURE: 144 MMHG | TEMPERATURE: 97.5 F | WEIGHT: 189 LBS | DIASTOLIC BLOOD PRESSURE: 83 MMHG | BODY MASS INDEX: 32.44 KG/M2

## 2024-05-06 DIAGNOSIS — R07.9 CHEST PAIN: Primary | ICD-10-CM

## 2024-05-06 DIAGNOSIS — K21.9 GERD (GASTROESOPHAGEAL REFLUX DISEASE): ICD-10-CM

## 2024-05-06 DIAGNOSIS — Z01.818 PREOP EXAMINATION: ICD-10-CM

## 2024-05-06 LAB
ALBUMIN SERPL BCP-MCNC: 4.1 G/DL (ref 3.5–5)
ALP SERPL-CCNC: 75 U/L (ref 34–104)
ALT SERPL W P-5'-P-CCNC: 16 U/L (ref 7–52)
ANION GAP SERPL CALCULATED.3IONS-SCNC: 8 MMOL/L (ref 4–13)
ANION GAP SERPL CALCULATED.3IONS-SCNC: 9 MMOL/L (ref 4–13)
APTT PPP: 28 SECONDS (ref 23–37)
AST SERPL W P-5'-P-CCNC: 10 U/L (ref 13–39)
B-HCG SERPL-ACNC: 0.7 MIU/ML (ref 0–5)
BACTERIA UR QL AUTO: ABNORMAL /HPF
BASOPHILS # BLD AUTO: 0.06 THOUSANDS/ÂΜL (ref 0–0.1)
BASOPHILS # BLD AUTO: 0.07 THOUSANDS/ÂΜL (ref 0–0.1)
BASOPHILS NFR BLD AUTO: 1 % (ref 0–1)
BASOPHILS NFR BLD AUTO: 1 % (ref 0–1)
BILIRUB SERPL-MCNC: 0.49 MG/DL (ref 0.2–1)
BILIRUB UR QL STRIP: NEGATIVE
BUN SERPL-MCNC: 10 MG/DL (ref 5–25)
BUN SERPL-MCNC: 10 MG/DL (ref 5–25)
CALCIUM SERPL-MCNC: 9.3 MG/DL (ref 8.4–10.2)
CALCIUM SERPL-MCNC: 9.6 MG/DL (ref 8.4–10.2)
CARDIAC TROPONIN I PNL SERPL HS: <2 NG/L
CHLORIDE SERPL-SCNC: 102 MMOL/L (ref 96–108)
CHLORIDE SERPL-SCNC: 103 MMOL/L (ref 96–108)
CLARITY UR: ABNORMAL
CO2 SERPL-SCNC: 24 MMOL/L (ref 21–32)
CO2 SERPL-SCNC: 25 MMOL/L (ref 21–32)
COLOR UR: YELLOW
CREAT SERPL-MCNC: 0.66 MG/DL (ref 0.6–1.3)
CREAT SERPL-MCNC: 0.66 MG/DL (ref 0.6–1.3)
EOSINOPHIL # BLD AUTO: 0.23 THOUSAND/ÂΜL (ref 0–0.61)
EOSINOPHIL # BLD AUTO: 0.27 THOUSAND/ÂΜL (ref 0–0.61)
EOSINOPHIL NFR BLD AUTO: 2 % (ref 0–6)
EOSINOPHIL NFR BLD AUTO: 2 % (ref 0–6)
ERYTHROCYTE [DISTWIDTH] IN BLOOD BY AUTOMATED COUNT: 12.7 % (ref 11.6–15.1)
ERYTHROCYTE [DISTWIDTH] IN BLOOD BY AUTOMATED COUNT: 12.7 % (ref 11.6–15.1)
GFR SERPL CREATININE-BSD FRML MDRD: 107 ML/MIN/1.73SQ M
GFR SERPL CREATININE-BSD FRML MDRD: 107 ML/MIN/1.73SQ M
GLUCOSE P FAST SERPL-MCNC: 256 MG/DL (ref 65–99)
GLUCOSE SERPL-MCNC: 239 MG/DL (ref 65–140)
GLUCOSE UR STRIP-MCNC: ABNORMAL MG/DL
HCT VFR BLD AUTO: 40.4 % (ref 34.8–46.1)
HCT VFR BLD AUTO: 43.3 % (ref 34.8–46.1)
HGB BLD-MCNC: 14.1 G/DL (ref 11.5–15.4)
HGB BLD-MCNC: 14.7 G/DL (ref 11.5–15.4)
HGB UR QL STRIP.AUTO: NEGATIVE
IMM GRANULOCYTES # BLD AUTO: 0.05 THOUSAND/UL (ref 0–0.2)
IMM GRANULOCYTES # BLD AUTO: 0.05 THOUSAND/UL (ref 0–0.2)
IMM GRANULOCYTES NFR BLD AUTO: 0 % (ref 0–2)
IMM GRANULOCYTES NFR BLD AUTO: 0 % (ref 0–2)
INR PPP: 1 (ref 0.84–1.19)
KETONES UR STRIP-MCNC: NEGATIVE MG/DL
LEUKOCYTE ESTERASE UR QL STRIP: NEGATIVE
LYMPHOCYTES # BLD AUTO: 2.84 THOUSANDS/ÂΜL (ref 0.6–4.47)
LYMPHOCYTES # BLD AUTO: 3.35 THOUSANDS/ÂΜL (ref 0.6–4.47)
LYMPHOCYTES NFR BLD AUTO: 24 % (ref 14–44)
LYMPHOCYTES NFR BLD AUTO: 26 % (ref 14–44)
MCH RBC QN AUTO: 29.8 PG (ref 26.8–34.3)
MCH RBC QN AUTO: 30.1 PG (ref 26.8–34.3)
MCHC RBC AUTO-ENTMCNC: 33.9 G/DL (ref 31.4–37.4)
MCHC RBC AUTO-ENTMCNC: 34.9 G/DL (ref 31.4–37.4)
MCV RBC AUTO: 86 FL (ref 82–98)
MCV RBC AUTO: 88 FL (ref 82–98)
MONOCYTES # BLD AUTO: 0.68 THOUSAND/ÂΜL (ref 0.17–1.22)
MONOCYTES # BLD AUTO: 0.73 THOUSAND/ÂΜL (ref 0.17–1.22)
MONOCYTES NFR BLD AUTO: 6 % (ref 4–12)
MONOCYTES NFR BLD AUTO: 6 % (ref 4–12)
NEUTROPHILS # BLD AUTO: 8.14 THOUSANDS/ÂΜL (ref 1.85–7.62)
NEUTROPHILS # BLD AUTO: 8.66 THOUSANDS/ÂΜL (ref 1.85–7.62)
NEUTS SEG NFR BLD AUTO: 65 % (ref 43–75)
NEUTS SEG NFR BLD AUTO: 67 % (ref 43–75)
NITRITE UR QL STRIP: NEGATIVE
NON-SQ EPI CELLS URNS QL MICRO: ABNORMAL /HPF
NRBC BLD AUTO-RTO: 0 /100 WBCS
NRBC BLD AUTO-RTO: 0 /100 WBCS
PH UR STRIP.AUTO: 6 [PH]
PLATELET # BLD AUTO: 251 THOUSANDS/UL (ref 149–390)
PLATELET # BLD AUTO: 287 THOUSANDS/UL (ref 149–390)
PMV BLD AUTO: 10.1 FL (ref 8.9–12.7)
PMV BLD AUTO: 10.2 FL (ref 8.9–12.7)
POTASSIUM SERPL-SCNC: 3.7 MMOL/L (ref 3.5–5.3)
POTASSIUM SERPL-SCNC: 4 MMOL/L (ref 3.5–5.3)
PROT SERPL-MCNC: 7.3 G/DL (ref 6.4–8.4)
PROT UR STRIP-MCNC: NEGATIVE MG/DL
PROTHROMBIN TIME: 13.1 SECONDS (ref 11.6–14.5)
RBC # BLD AUTO: 4.69 MILLION/UL (ref 3.81–5.12)
RBC # BLD AUTO: 4.94 MILLION/UL (ref 3.81–5.12)
RBC #/AREA URNS AUTO: ABNORMAL /HPF
SODIUM SERPL-SCNC: 135 MMOL/L (ref 135–147)
SODIUM SERPL-SCNC: 136 MMOL/L (ref 135–147)
SP GR UR STRIP.AUTO: 1.02 (ref 1–1.03)
UROBILINOGEN UR STRIP-ACNC: <2 MG/DL
WBC # BLD AUTO: 12.01 THOUSAND/UL (ref 4.31–10.16)
WBC # BLD AUTO: 13.12 THOUSAND/UL (ref 4.31–10.16)
WBC #/AREA URNS AUTO: ABNORMAL /HPF

## 2024-05-06 PROCEDURE — 85025 COMPLETE CBC W/AUTO DIFF WBC: CPT | Performed by: EMERGENCY MEDICINE

## 2024-05-06 PROCEDURE — 80053 COMPREHEN METABOLIC PANEL: CPT

## 2024-05-06 PROCEDURE — 84702 CHORIONIC GONADOTROPIN TEST: CPT

## 2024-05-06 PROCEDURE — 93005 ELECTROCARDIOGRAM TRACING: CPT

## 2024-05-06 PROCEDURE — 99285 EMERGENCY DEPT VISIT HI MDM: CPT | Performed by: EMERGENCY MEDICINE

## 2024-05-06 PROCEDURE — 85730 THROMBOPLASTIN TIME PARTIAL: CPT

## 2024-05-06 PROCEDURE — 81001 URINALYSIS AUTO W/SCOPE: CPT

## 2024-05-06 PROCEDURE — 99284 EMERGENCY DEPT VISIT MOD MDM: CPT

## 2024-05-06 PROCEDURE — 85610 PROTHROMBIN TIME: CPT

## 2024-05-06 PROCEDURE — 84484 ASSAY OF TROPONIN QUANT: CPT | Performed by: EMERGENCY MEDICINE

## 2024-05-06 PROCEDURE — 80048 BASIC METABOLIC PNL TOTAL CA: CPT | Performed by: EMERGENCY MEDICINE

## 2024-05-06 PROCEDURE — 85025 COMPLETE CBC W/AUTO DIFF WBC: CPT

## 2024-05-06 PROCEDURE — 36415 COLL VENOUS BLD VENIPUNCTURE: CPT

## 2024-05-06 RX ORDER — MAGNESIUM HYDROXIDE/ALUMINUM HYDROXICE/SIMETHICONE 120; 1200; 1200 MG/30ML; MG/30ML; MG/30ML
30 SUSPENSION ORAL ONCE
Status: COMPLETED | OUTPATIENT
Start: 2024-05-06 | End: 2024-05-06

## 2024-05-06 RX ORDER — LIDOCAINE HYDROCHLORIDE 20 MG/ML
15 SOLUTION OROPHARYNGEAL ONCE
Status: COMPLETED | OUTPATIENT
Start: 2024-05-06 | End: 2024-05-06

## 2024-05-06 RX ORDER — FAMOTIDINE 20 MG/1
20 TABLET, FILM COATED ORAL 2 TIMES DAILY
Qty: 30 TABLET | Refills: 0 | Status: SHIPPED | OUTPATIENT
Start: 2024-05-06

## 2024-05-06 RX ORDER — FAMOTIDINE 10 MG/ML
20 INJECTION, SOLUTION INTRAVENOUS ONCE
Status: DISCONTINUED | OUTPATIENT
Start: 2024-05-06 | End: 2024-05-06 | Stop reason: HOSPADM

## 2024-05-06 RX ADMIN — ALUMINUM HYDROXIDE, MAGNESIUM HYDROXIDE, DIMETHICONE 30 ML: 400; 400; 40 SUSPENSION ORAL at 16:03

## 2024-05-06 RX ADMIN — LIDOCAINE HYDROCHLORIDE 15 ML: 20 SOLUTION ORAL at 16:03

## 2024-05-06 NOTE — ED PROVIDER NOTES
Final Diagnosis:  1. Chest pain    2. GERD (gastroesophageal reflux disease)        Chief Complaint   Patient presents with    Chest Pain     Patient reports CP starting 2 days ago but yesterday it got worse. Also complains of a sore throat that started about 4 days ago.      HPI  Patient presents for evaluation of substernal chest pain.  Patient states has been going on for the past couple days.  She states it is worse whenever she drinks or eats.  She identifies this as being in the middle of her chest without any radiation.  No other relieving or exacerbating factors.  Patient states that she took Tylenol yesterday to help with the discomfort and she normally does not take medications for this.  Patient states that she has a history of abdominal pain that no one knows exactly what is causing that.  States that her abdominal pain is at its baseline.  States that she has not had any endoscopy done at any point.      Unless otherwise specified:  - No language barrier.   - History obtained from patient.   - There are no limitations to the history obtained.  - Previous charting was reviewed    PMH:   has a past medical history of Asthma and Diabetes mellitus (HCC).    PSH:   has a past surgical history that includes Foot fracture surgery;  section; Adenoidectomy; Cosmetic surgery; and Tympanostomy tube placement.       ROS:  Review of Systems   - 13 point ROS was performed and all are normal unless stated in the history above.   - Nursing note reviewed. Vitals reviewed.   - Orders placed by myself and/or advanced practitioner / resident.        PE:   Vitals:    24 1445   BP: 144/83   BP Location: Right arm   Pulse: 89   Resp: 18   Temp: 97.5 °F (36.4 °C)   TempSrc: Temporal   SpO2: 97%   Weight: 85.7 kg (189 lb)     Vitals reviewed by me.       Unless otherwise specified above:    General: VS reviewed  Appears in NAD    Head: Normocephalic, atraumatic  Eyes: EOM-I.     ENT: Atraumatic external nose and  ears.    No drooling.     Neck: No JVD.    CV: No pallor noted  Lungs:   No tachypnea  No respiratory distress    Abdomen:  Soft, non-tender, non-distended    MSK:   No obvious deformity    Skin: Dry, intact. No obvious rash.    Psychiatric/Behavioral: Appropriate mood and affect   Exam: deferred    Physical Exam     Procedures   A:  - Nursing note reviewed.    HEART Risk Score      Flowsheet Row Most Recent Value   Heart Score Risk Calculator    History 0 Filed at: 05/06/2024 1722   ECG 0 Filed at: 05/06/2024 1722   Age 0 Filed at: 05/06/2024 1722   Risk Factors 0 Filed at: 05/06/2024 1722   Troponin 0 Filed at: 05/06/2024 1722   HEART Score 0 Filed at: 05/06/2024 1722                       ED Course as of 05/06/24 1722   Mon May 06, 2024   1506 Procedure Note: EKG  Date/Time: 05/06/24 3:06 PM   Interpreted by: Octavio Boyce  Indications / Diagnosis: CP  ECG reviewed by me, the ED Provider: yes   The EKG demonstrates:  Rhythm: normal sinus  Intervals: normal intervals  Axis: normal axis  QRS/Blocks: normal QRS  ST Changes: No acute ST Changes, no STD/REGLA.    No diffuse elevations to indicate pericarditis.  No coved ST elevations greater than 2mm with negative T waves in V1-3 to indicate concern for brugada.  No biphasic T waves in V2, V3 to indicate Wellens (critical stenosis of LAD).   No elevation in aVR or deviation when compared to V1 (can be associated with ST depression in I,II, V4-6 when left main occlusion is present).         No orders to display     Orders Placed This Encounter   Procedures    CBC and differential    Basic metabolic panel    HS Troponin 0hr (reflex protocol)    HS Troponin I 2hr    HS Troponin I 4hr    ECG 12 lead     Labs Reviewed   CBC AND DIFFERENTIAL - Abnormal       Result Value Ref Range Status    WBC 12.01 (*) 4.31 - 10.16 Thousand/uL Final    RBC 4.94  3.81 - 5.12 Million/uL Final    Hemoglobin 14.7  11.5 - 15.4 g/dL Final    Hematocrit 43.3  34.8 - 46.1 % Final    MCV 88  82  - 98 fL Final    MCH 29.8  26.8 - 34.3 pg Final    MCHC 33.9  31.4 - 37.4 g/dL Final    RDW 12.7  11.6 - 15.1 % Final    MPV 10.2  8.9 - 12.7 fL Final    Platelets 251  149 - 390 Thousands/uL Final    nRBC 0  /100 WBCs Final    Segmented % 67  43 - 75 % Final    Immature Grans % 0  0 - 2 % Final    Lymphocytes % 24  14 - 44 % Final    Monocytes % 6  4 - 12 % Final    Eosinophils Relative 2  0 - 6 % Final    Basophils Relative 1  0 - 1 % Final    Absolute Neutrophils 8.14 (*) 1.85 - 7.62 Thousands/µL Final    Absolute Immature Grans 0.05  0.00 - 0.20 Thousand/uL Final    Absolute Lymphocytes 2.84  0.60 - 4.47 Thousands/µL Final    Absolute Monocytes 0.68  0.17 - 1.22 Thousand/µL Final    Eosinophils Absolute 0.23  0.00 - 0.61 Thousand/µL Final    Basophils Absolute 0.07  0.00 - 0.10 Thousands/µL Final   BASIC METABOLIC PANEL - Abnormal    Sodium 135  135 - 147 mmol/L Final    Potassium 3.7  3.5 - 5.3 mmol/L Final    Chloride 102  96 - 108 mmol/L Final    CO2 24  21 - 32 mmol/L Final    ANION GAP 9  4 - 13 mmol/L Final    BUN 10  5 - 25 mg/dL Final    Creatinine 0.66  0.60 - 1.30 mg/dL Final    Comment: Standardized to IDMS reference method    Glucose 239 (*) 65 - 140 mg/dL Final    Comment: If the patient is fasting, the ADA then defines impaired fasting glucose as > 100 mg/dL and diabetes as > or equal to 123 mg/dL.    Calcium 9.6  8.4 - 10.2 mg/dL Final    eGFR 107  ml/min/1.73sq m Final    Narrative:     National Kidney Disease Foundation guidelines for Chronic Kidney Disease (CKD):     Stage 1 with normal or high GFR (GFR > 90 mL/min/1.73 square meters)    Stage 2 Mild CKD (GFR = 60-89 mL/min/1.73 square meters)    Stage 3A Moderate CKD (GFR = 45-59 mL/min/1.73 square meters)    Stage 3B Moderate CKD (GFR = 30-44 mL/min/1.73 square meters)    Stage 4 Severe CKD (GFR = 15-29 mL/min/1.73 square meters)    Stage 5 End Stage CKD (GFR <15 mL/min/1.73 square meters)  Note: GFR calculation is accurate only with a steady  "state creatinine   HS TROPONIN I 0HR - Normal    hs TnI 0hr <2  \"Refer to ACS Flowchart\"- see link ng/L Final    Comment:                                              Initial (time 0) result  If >=50 ng/L, Myocardial injury suggested ;  Type of myocardial injury and treatment strategy  to be determined.  If 5-49 ng/L, a delta result at 2 hours and or 4 hours will be needed to further evaluate.  If <4 ng/L, and chest pain has been >3 hours since onset, patient may qualify for discharge based on the HEART score in the ED.  If <5 ng/L and <3hours since onset of chest pain, a delta result at 2 hours will be needed to further evaluate.    HS Troponin 99th Percentile URL of a Health Population=12 ng/L with a 95% Confidence Interval of 8-18 ng/L.    Second Troponin (time 2 hours)  If calculated delta >= 20 ng/L,  Myocardial injury suggested ; Type of myocardial injury and treatment strategy to be determined.  If 5-49 ng/L and the calculated delta is 5-19 ng/L, consult medical service for evaluation.  Continue evaluation for ischemia on ecg and other possible etiology and repeat hs troponin at 4 hours.  If delta is <5 ng/L at 2 hours, consider discharge based on risk stratification via the HEART score (if in ED), or CALEB risk score in IP/Observation.    HS Troponin 99th Percentile URL of a Health Population=12 ng/L with a 95% Confidence Interval of 8-18 ng/L.   HS TROPONIN I 2HR         Final Diagnosis:  1. Chest pain    2. GERD (gastroesophageal reflux disease)        P:  -Based on her history I would favor GI issue, may be acid reflux, may be irritation to esophagus.  Will provide evaluation to rule out ACS, arrhythmia, electrolyte disturbances.  Provide analgesia.  -Benign workup.  Apparently patient eloped prior to me being up to review the results with her.      Unless otherwise noted the patient's medications were reviewed and they should continue as directed.    Unless otherwise specified:  CC is exclusive from any " separately billable procedures  CC is exclusive of treating other patients  CC is exclusive of teaching time   All splints are static    Medications   Famotidine (PF) (PEPCID) injection 20 mg (has no administration in time range)   aluminum-magnesium hydroxide-simethicone (MAALOX) oral suspension 30 mL (30 mL Oral Given 5/6/24 1603)   Lidocaine Viscous HCl (XYLOCAINE) 2 % mucosal solution 15 mL (15 mL Swish & Spit Given 5/6/24 1603)     Time reflects when diagnosis was documented in both MDM as applicable and the Disposition within this note       Time User Action Codes Description Comment    5/6/2024  4:27 PM Octavio Boyce Add [R07.9] Chest pain     5/6/2024  4:27 PM Octavio Boyce Add [K21.9] GERD (gastroesophageal reflux disease)           ED Disposition       ED Disposition   Left from Room after Provider Exam    Condition   --    Date/Time   Mon May 6, 2024 1722    Comment   --             Follow-up Information       Follow up With Specialties Details Why Contact Info Additional Information    [facility]   [address] [place] [id number]  [phone] [facility] [address], [address]   [phone]          Patient's Medications   Discharge Prescriptions    FAMOTIDINE (PEPCID) 20 MG TABLET    Take 1 tablet (20 mg total) by mouth 2 (two) times a day       Start Date: 5/6/2024  End Date: --       Order Dose: 20 mg       Quantity: 30 tablet    Refills: 0     No discharge procedures on file.  Prior to Admission Medications   Prescriptions Last Dose Informant Patient Reported? Taking?   Blood Glucose Monitoring Suppl (OneTouch Verio Reflect) w/Device KIT   No No   Sig: Check blood sugars once daily. Please substitute with appropriate alternative as covered by patient's insurance. Dx: E11.65   Continuous Blood Gluc Sensor (FreeStyle Richelle 3 Sensor) MISC   Yes No   Diclofenac Sodium  (VOLTAREN) 1 %   No No   Sig: Apply 2 g topically 4 (four) times a day   Naproxen Sodium-Gabapentin (Gabapentin-Naproxen Cmpd Kit) 5-10 % CREA   No No   Sig: Apply 1 Application topically 3 (three) times a day as needed (for right elbow pain)   NovoFine Plus Pen Needle 32G X 4 MM MISC   Yes No   Sig: USE TO INJECT MEDICATION UNDER THE SKIN ONCE WEEKLY FOR 30 DAYS   OneTouch Delica Lancets 33G MISC   No No   Sig: Check blood sugars once daily. Please substitute with appropriate alternative as covered by patient's insurance. Dx: E11.65   Respiratory Therapy Supplies (Nebulizer) KAMINI   No No   Sig: Use as needed (Shortness of breath, wheezing)   Patient not taking: Reported on 9/21/2022   albuterol (2.5 mg/3 mL) 0.083 % nebulizer solution   No No   Sig: Take 3 mL (2.5 mg total) by nebulization every 6 (six) hours as needed for wheezing or shortness of breath   albuterol (PROVENTIL HFA,VENTOLIN HFA) 90 mcg/act inhaler   No No   Sig: Inhale 1-2 puffs every 4 (four) hours as needed for shortness of breath or wheezing   Patient not taking: Reported on 4/30/2024   glucose blood (OneTouch Verio) test strip   No No   Sig: Check blood sugars once daily. Please substitute with appropriate alternative as covered by patient's insurance. Dx: E11.65   halobetasol (ULTRAVATE) 0.05 % cream   No No   Sig: Apply topically 2 (two) times a day   lidocaine (Lidoderm) 5 %   No No   Sig: Apply 1 patch topically over 12 hours daily Remove & Discard patch within 12 hours or as directed by MD   Patient not taking: Reported on 3/5/2024   metFORMIN (GLUCOPHAGE) 500 mg tablet   No No   Sig: Take 1 tablet (500 mg total) by mouth daily with breakfast for 7 days, THEN 1 tablet (500 mg total) 2 (two) times a day with meals for 7 days, THEN 2 tablets (1,000 mg total) 2 (two) times a day with meals.   nicotine (NICODERM CQ) 21 mg/24 hr TD 24 hr patch   No No   Sig: Place 1 patch on the skin over 24 hours every 24 hours   ondansetron (ZOFRAN-ODT) 4 mg  "disintegrating tablet   No No   Sig: Take 1 tablet (4 mg total) by mouth every 6 (six) hours as needed for vomiting or nausea   Patient not taking: Reported on 3/5/2024   semaglutide, 2 mg/dose, (Ozempic) 8 mg/ mL injection pen   No No   Sig: Inject 0.75 mL (2 mg total) under the skin every 7 days      Facility-Administered Medications: None       Portions of the record may have been created with voice recognition software. Occasional wrong word or \"sound a like\" substitutions may have occurred due to the inherent limitations of voice recognition software. Read the chart carefully and recognize, using context, where substitutions have occurred.    Electronically signed by:  MD Octavio Hawk MD  05/06/24 [id number]    "

## 2024-05-07 ENCOUNTER — TELEPHONE (OUTPATIENT)
Age: 44
End: 2024-05-07

## 2024-05-07 LAB
ATRIAL RATE: 87 BPM
P AXIS: 43 DEGREES
PR INTERVAL: 162 MS
QRS AXIS: 46 DEGREES
QRSD INTERVAL: 74 MS
QT INTERVAL: 374 MS
QTC INTERVAL: 450 MS
T WAVE AXIS: 25 DEGREES
VENTRICULAR RATE: 87 BPM

## 2024-05-07 PROCEDURE — 93010 ELECTROCARDIOGRAM REPORT: CPT | Performed by: INTERNAL MEDICINE

## 2024-05-07 NOTE — TELEPHONE ENCOUNTER
Patient called in stating she needs her EKG faxed to [facility]. Patient states she needs this faxed over asap. She recently had a EKG done. Please advise.

## 2024-05-07 NOTE — TELEPHONE ENCOUNTER
Patients pre op forms, consult note, and lab results faxed to surgeon. Form is scanned into the patients chart. Patient was made aware they were faxed as well.

## 2024-05-07 NOTE — TELEPHONE ENCOUNTER
Patient called in to follow up on medical clearance forms to be filled out for upcoming surgery May 13th.   Patient is also requesting labs from 5/6 to be faxed over along with a recent A1C.  Fax # [phone]    Patient was also seen in the ED 5/6. Ed follow up apt made for 5/8.    Please advise. Patient would like a return call.        Statement Selected

## 2024-06-06 DIAGNOSIS — E11.9 TYPE 2 DIABETES MELLITUS WITHOUT COMPLICATION, WITHOUT LONG-TERM CURRENT USE OF INSULIN (HCC): ICD-10-CM

## 2024-10-01 ENCOUNTER — HOSPITAL ENCOUNTER (EMERGENCY)
Facility: HOSPITAL | Age: 44
Discharge: HOME | End: 2024-10-01
Attending: EMERGENCY MEDICINE
Payer: COMMERCIAL

## 2024-10-01 ENCOUNTER — APPOINTMENT (EMERGENCY)
Dept: RADIOLOGY | Facility: HOSPITAL | Age: 44
End: 2024-10-01
Payer: COMMERCIAL

## 2024-10-01 VITALS
RESPIRATION RATE: 18 BRPM | DIASTOLIC BLOOD PRESSURE: 95 MMHG | WEIGHT: 188.93 LBS | OXYGEN SATURATION: 99 % | BODY MASS INDEX: 32.43 KG/M2 | SYSTOLIC BLOOD PRESSURE: 137 MMHG | HEART RATE: 89 BPM | TEMPERATURE: 97.2 F

## 2024-10-01 DIAGNOSIS — S91.209A TOENAIL AVULSION: Primary | ICD-10-CM

## 2024-10-01 PROCEDURE — 73660 X-RAY EXAM OF TOE(S): CPT

## 2024-10-01 PROCEDURE — 99284 EMERGENCY DEPT VISIT MOD MDM: CPT | Performed by: PHYSICIAN ASSISTANT

## 2024-10-01 PROCEDURE — 99283 EMERGENCY DEPT VISIT LOW MDM: CPT

## 2024-10-01 RX ORDER — NAPROXEN 500 MG/1
500 TABLET ORAL 2 TIMES DAILY WITH MEALS
Qty: 15 TABLET | Refills: 0 | Status: SHIPPED | OUTPATIENT
Start: 2024-10-01

## 2024-10-01 RX ORDER — CEPHALEXIN 500 MG/1
500 CAPSULE ORAL EVERY 6 HOURS SCHEDULED
Qty: 28 CAPSULE | Refills: 0 | Status: SHIPPED | OUTPATIENT
Start: 2024-10-01 | End: 2024-10-08

## 2024-10-01 NOTE — ED PROVIDER NOTES
Final diagnoses:   Toenail avulsion     ED Disposition       ED Disposition   Discharge    Condition   Stable    Date/Time   Tue Oct 1, 2024  7:41 PM    Comment   Ramona Hill discharge to home/self care.                   Assessment & Plan       Medical Decision Making  44-year-old female left great toe pain status post traumatic event 2 days ago.  Will require x-rays to rule out fracture dislocation of the phalanx.  Subungual hematoma is also in the differential.    X-rays negative for fracture.  There is some erythema surrounding the toenail, she could be developing an early cellulitis we will place her on antibiotics for staph coverage.  Recommend podiatry follow-up.    Amount and/or Complexity of Data Reviewed  Radiology: ordered and independent interpretation performed.    Risk  Prescription drug management.        ED Course as of 10/01/24 2221   Tue Oct 01, 2024   1823 SpO2: 99 %    Respirations: 18    Pulse: 89    Temperature(!): 97.2 °F (36.2 °C)   1823 Blood Pressure: 137/95  Vital signs reviewed within normal limits.       Medications - No data to display    ED Risk Strat Scores                           SBIRT 22yo+      Flowsheet Row Most Recent Value   Initial Alcohol Screen: US AUDIT-C     3b. FEMALE Any Age, or MALE 65+: How often do you have 4 or more drinks on one occassion? 0 Filed at: 10/01/2024 1805   Audit-C Score 0 Filed at: 10/01/2024 1805   DEE: How many times in the past year have you...    Used an illegal drug or used a prescription medication for non-medical reasons? Never Filed at: 10/01/2024 1805                            History of Present Illness       Chief Complaint   Patient presents with    Toe Injury     Pt stubbed left great toe. Pain and swelling noted       Past Medical History:   Diagnosis Date    Asthma     Diabetes mellitus (HCC)       Past Surgical History:   Procedure Laterality Date    ADENOIDECTOMY       SECTION      COSMETIC SURGERY      stomach-  lipo and fat transfer    FOOT FRACTURE SURGERY      TYMPANOSTOMY TUBE PLACEMENT      fell out      Family History   Problem Relation Age of Onset    Hypertension Mother     Diabetes Mother     Stomach cancer Father     Colon cancer Father     Diabetes Father     Lung cancer Maternal Grandmother     No Known Problems Maternal Grandfather     No Known Problems Paternal Grandmother     No Known Problems Paternal Grandfather     No Known Problems Half-Brother     No Known Problems Half-Brother     Stomach cancer Cousin     Hypertension Maternal Aunt     Diabetes Maternal Aunt     Hypertension Maternal Aunt     No Known Problems Paternal Aunt     Diabetes Paternal Aunt     No Known Problems Paternal Aunt     No Known Problems Paternal Aunt       Social History     Tobacco Use    Smoking status: Every Day     Current packs/day: 1.00     Average packs/day: 1 pack/day for 15.0 years (15.0 ttl pk-yrs)     Types: Cigarettes    Smokeless tobacco: Never    Tobacco comments:     1-2 cigarettes a day   Vaping Use    Vaping status: Never Used   Substance Use Topics    Alcohol use: Not Currently    Drug use: Never      E-Cigarette/Vaping    E-Cigarette Use Never User       E-Cigarette/Vaping Substances    Nicotine No     THC No     CBD No     Flavoring No     Other No     Unknown No       I have reviewed and agree with the history as documented.     This is a pleasant 44-year-old female presenting to the emergency department today for evaluation of left great toe pain and swelling.  She presents alone.  The history is a stabbing type injury to the left great toe 2 days ago.  There was a partial distal nail avulsion.  Increased pain and swelling was noted throughout the day today.  Pain with ambulation.  No tenderness of toes 2 through 5.        Review of Systems   Constitutional:  Negative for chills and fever.   HENT:  Negative for ear pain and sore throat.    Eyes:  Negative for pain and visual disturbance.   Respiratory:   Negative for cough and shortness of breath.    Cardiovascular:  Negative for chest pain and palpitations.   Gastrointestinal:  Negative for abdominal pain and vomiting.   Genitourinary:  Negative for dysuria and hematuria.   Musculoskeletal:  Negative for arthralgias and back pain.        L great toe pain/swelling   Skin:  Negative for color change and rash.   Neurological:  Negative for seizures and syncope.   All other systems reviewed and are negative.          Objective       ED Triage Vitals [10/01/24 1803]   Temperature Pulse Blood Pressure Respirations SpO2 Patient Position - Orthostatic VS   (!) 97.2 °F (36.2 °C) 89 137/95 18 99 % Sitting      Temp Source Heart Rate Source BP Location FiO2 (%) Pain Score    Temporal Monitor Right arm -- 8      Vitals      Date and Time Temp Pulse SpO2 Resp BP Pain Score FACES Pain Rating User   10/01/24 1803 97.2 °F (36.2 °C) 89 99 % 18 137/95 8 -- KTR            Physical Exam  Vitals reviewed.   Constitutional:       General: She is not in acute distress.     Appearance: Normal appearance. She is not ill-appearing, toxic-appearing or diaphoretic.   HENT:      Head: Normocephalic and atraumatic.      Right Ear: External ear normal.      Left Ear: External ear normal.   Eyes:      General: No scleral icterus.        Right eye: No discharge.         Left eye: No discharge.      Extraocular Movements: Extraocular movements intact.      Conjunctiva/sclera: Conjunctivae normal.   Cardiovascular:      Rate and Rhythm: Normal rate.      Pulses: Normal pulses.   Pulmonary:      Effort: Pulmonary effort is normal. No respiratory distress.      Breath sounds: No stridor.   Musculoskeletal:         General: Tenderness present. No swelling, deformity or signs of injury.      Cervical back: Normal range of motion. No rigidity.   Skin:     General: Skin is warm.      Coloration: Skin is not jaundiced.      Findings: No lesion or rash.   Neurological:      General: No focal deficit  present.      Mental Status: She is alert and oriented to person, place, and time. Mental status is at baseline.      Gait: Gait normal.   Psychiatric:         Mood and Affect: Mood normal.         Thought Content: Thought content normal.         Judgment: Judgment normal.         Results Reviewed       None            XR toe great min 2 views LEFT   ED Interpretation by Malick Scherer PA-C (10/01 1913)   No fx          Procedures    ED Medication and Procedure Management   Prior to Admission Medications   Prescriptions Last Dose Informant Patient Reported? Taking?   Blood Glucose Monitoring Suppl (OneTouch Verio Reflect) w/Device KIT   No No   Sig: Check blood sugars once daily. Please substitute with appropriate alternative as covered by patient's insurance. Dx: E11.65   Continuous Blood Gluc Sensor (FreeStyle Richelle 3 Sensor) MISC   Yes No   Diclofenac Sodium (VOLTAREN) 1 %   No No   Sig: Apply 2 g topically 4 (four) times a day   Naproxen Sodium-Gabapentin (Gabapentin-Naproxen Cmpd Kit) 5-10 % CREA   No No   Sig: Apply 1 Application topically 3 (three) times a day as needed (for right elbow pain)   NovoFine Plus Pen Needle 32G X 4 MM MISC   Yes No   Sig: USE TO INJECT MEDICATION UNDER THE SKIN ONCE WEEKLY FOR 30 DAYS   OneTouch Delica Lancets 33G MISC   No No   Sig: Check blood sugars once daily. Please substitute with appropriate alternative as covered by patient's insurance. Dx: E11.65   Respiratory Therapy Supplies (Nebulizer) KAMINI   No No   Sig: Use as needed (Shortness of breath, wheezing)   Patient not taking: Reported on 9/21/2022   albuterol (2.5 mg/3 mL) 0.083 % nebulizer solution   No No   Sig: Take 3 mL (2.5 mg total) by nebulization every 6 (six) hours as needed for wheezing or shortness of breath   albuterol (PROVENTIL HFA,VENTOLIN HFA) 90 mcg/act inhaler   No No   Sig: Inhale 1-2 puffs every 4 (four) hours as needed for shortness of breath or wheezing   Patient not taking: Reported on  4/30/2024   famotidine (PEPCID) 20 mg tablet   No No   Sig: Take 1 tablet (20 mg total) by mouth 2 (two) times a day   glucose blood (OneTouch Verio) test strip   No No   Sig: Check blood sugars once daily. Please substitute with appropriate alternative as covered by patient's insurance. Dx: E11.65   halobetasol (ULTRAVATE) 0.05 % cream   No No   Sig: Apply topically 2 (two) times a day   lidocaine (Lidoderm) 5 %   No No   Sig: Apply 1 patch topically over 12 hours daily Remove & Discard patch within 12 hours or as directed by MD   Patient not taking: Reported on 3/5/2024   metFORMIN (GLUCOPHAGE) 1000 MG tablet   No No   Sig: Take 1 tablet (1,000 mg total) by mouth 2 (two) times a day with meals   nicotine (NICODERM CQ) 21 mg/24 hr TD 24 hr patch   No No   Sig: Place 1 patch on the skin over 24 hours every 24 hours   ondansetron (ZOFRAN-ODT) 4 mg disintegrating tablet   No No   Sig: Take 1 tablet (4 mg total) by mouth every 6 (six) hours as needed for vomiting or nausea   Patient not taking: Reported on 3/5/2024   semaglutide, 2 mg/dose, (Ozempic) 8 mg/ mL injection pen   No No   Sig: Inject 0.75 mL (2 mg total) under the skin every 7 days      Facility-Administered Medications: None     Discharge Medication List as of 10/1/2024  7:43 PM        START taking these medications    Details   cephalexin (KEFLEX) 500 mg capsule Take 1 capsule (500 mg total) by mouth every 6 (six) hours for 7 days, Starting Tue 10/1/2024, Until Tue 10/8/2024, Normal      naproxen (Naprosyn) 500 mg tablet Take 1 tablet (500 mg total) by mouth 2 (two) times a day with meals, Starting Tue 10/1/2024, Normal           CONTINUE these medications which have NOT CHANGED    Details   albuterol (2.5 mg/3 mL) 0.083 % nebulizer solution Take 3 mL (2.5 mg total) by nebulization every 6 (six) hours as needed for wheezing or shortness of breath, Starting Tue 4/30/2024, Normal      albuterol (PROVENTIL HFA,VENTOLIN HFA) 90 mcg/act inhaler Inhale 1-2 puffs  every 4 (four) hours as needed for shortness of breath or wheezing, Starting Thu 12/21/2023, Normal      Blood Glucose Monitoring Suppl (OneTouch Verio Reflect) w/Device KIT Check blood sugars once daily. Please substitute with appropriate alternative as covered by patient's insurance. Dx: E11.65, Normal      Continuous Blood Gluc Sensor (FreeStyle Richelle 3 Sensor) MISC Historical Med      Diclofenac Sodium (VOLTAREN) 1 % Apply 2 g topically 4 (four) times a day, Starting Thu 6/23/2022, Normal      famotidine (PEPCID) 20 mg tablet Take 1 tablet (20 mg total) by mouth 2 (two) times a day, Starting Mon 5/6/2024, Normal      glucose blood (OneTouch Verio) test strip Check blood sugars once daily. Please substitute with appropriate alternative as covered by patient's insurance. Dx: E11.65, Normal      halobetasol (ULTRAVATE) 0.05 % cream Apply topically 2 (two) times a day, Starting Thu 4/21/2022, Normal      lidocaine (Lidoderm) 5 % Apply 1 patch topically over 12 hours daily Remove & Discard patch within 12 hours or as directed by MD, Starting Wed 8/9/2023, Normal      metFORMIN (GLUCOPHAGE) 1000 MG tablet Take 1 tablet (1,000 mg total) by mouth 2 (two) times a day with meals, Starting Sun 6/9/2024, Until Sat 9/7/2024, Normal      Naproxen Sodium-Gabapentin (Gabapentin-Naproxen Cmpd Kit) 5-10 % CREA Apply 1 Application topically 3 (three) times a day as needed (for right elbow pain), Starting Mon 4/29/2024, Print      nicotine (NICODERM CQ) 21 mg/24 hr TD 24 hr patch Place 1 patch on the skin over 24 hours every 24 hours, Starting Tue 1/30/2024, Normal      NovoFine Plus Pen Needle 32G X 4 MM MISC USE TO INJECT MEDICATION UNDER THE SKIN ONCE WEEKLY FOR 30 DAYS, Historical Med      ondansetron (ZOFRAN-ODT) 4 mg disintegrating tablet Take 1 tablet (4 mg total) by mouth every 6 (six) hours as needed for vomiting or nausea, Starting Thu 12/21/2023, Normal      OneTouch Delica Lancets 33G MISC Check blood sugars once daily.  Please substitute with appropriate alternative as covered by patient's insurance. Dx: E11.65, Normal      Respiratory Therapy Supplies (Nebulizer) KAMINI Use as needed (Shortness of breath, wheezing), Starting Sun 9/18/2022, Normal      semaglutide, 2 mg/dose, (Ozempic) 8 mg/ mL injection pen Inject 0.75 mL (2 mg total) under the skin every 7 days, Starting Tue 3/5/2024, Normal           No discharge procedures on file.  ED SEPSIS DOCUMENTATION   Time reflects when diagnosis was documented in both MDM as applicable and the Disposition within this note       Time User Action Codes Description Comment    10/1/2024  7:41 PM Malick Scherer Add [S91.209A] Toenail avulsion                  Malick Scherer PA-C  10/01/24 2220

## 2024-10-15 ENCOUNTER — VBI (OUTPATIENT)
Dept: ADMINISTRATIVE | Facility: OTHER | Age: 44
End: 2024-10-15

## 2024-10-15 NOTE — TELEPHONE ENCOUNTER
10/15/24 9:02 AM     Chart reviewed for Diabetic Eye Exam ; nothing is submitted to the patient's insurance at this time.     AVIVA SOARES MA   [facility]

## 2024-10-30 ENCOUNTER — APPOINTMENT (OUTPATIENT)
Dept: URGENT CARE | Facility: CLINIC | Age: 44
End: 2024-10-30

## 2024-11-16 DIAGNOSIS — E11.9 TYPE 2 DIABETES MELLITUS WITHOUT COMPLICATION, WITHOUT LONG-TERM CURRENT USE OF INSULIN (HCC): ICD-10-CM

## 2024-11-18 RX ORDER — SEMAGLUTIDE 2.68 MG/ML
INJECTION, SOLUTION SUBCUTANEOUS
Qty: 2 ML | Refills: 0 | Status: SHIPPED | OUTPATIENT
Start: 2024-11-18

## 2024-11-18 NOTE — TELEPHONE ENCOUNTER
Patient needs an appointment. Please contact the patient to schedule an appointment. Last office visit: 4/30/2024

## 2024-11-25 ENCOUNTER — HOSPITAL ENCOUNTER (EMERGENCY)
Facility: HOSPITAL | Age: 44
Discharge: HOME | End: 2024-11-26
Attending: EMERGENCY MEDICINE

## 2024-11-25 DIAGNOSIS — T78.2XXA ANAPHYLAXIS, INITIAL ENCOUNTER: Primary | ICD-10-CM

## 2024-11-25 LAB
ANION GAP SERPL CALCULATED.3IONS-SCNC: 7 MMOL/L (ref 4–13)
BASOPHILS # BLD AUTO: 0.06 THOUSANDS/ΜL (ref 0–0.1)
BASOPHILS NFR BLD AUTO: 1 % (ref 0–1)
BUN SERPL-MCNC: 17 MG/DL (ref 5–25)
CALCIUM SERPL-MCNC: 9.2 MG/DL (ref 8.4–10.2)
CHLORIDE SERPL-SCNC: 104 MMOL/L (ref 96–108)
CO2 SERPL-SCNC: 25 MMOL/L (ref 21–32)
CREAT SERPL-MCNC: 0.9 MG/DL (ref 0.6–1.3)
EOSINOPHIL # BLD AUTO: 0.26 THOUSAND/ΜL (ref 0–0.61)
EOSINOPHIL NFR BLD AUTO: 2 % (ref 0–6)
ERYTHROCYTE [DISTWIDTH] IN BLOOD BY AUTOMATED COUNT: 13.2 % (ref 11.6–15.1)
GFR SERPL CREATININE-BSD FRML MDRD: 76 ML/MIN/1.73SQ M
GLUCOSE SERPL-MCNC: 154 MG/DL (ref 65–140)
HCT VFR BLD AUTO: 42.6 % (ref 34.8–46.1)
HGB BLD-MCNC: 14.4 G/DL (ref 11.5–15.4)
IMM GRANULOCYTES # BLD AUTO: 0.04 THOUSAND/UL (ref 0–0.2)
IMM GRANULOCYTES NFR BLD AUTO: 0 % (ref 0–2)
LYMPHOCYTES # BLD AUTO: 4.27 THOUSANDS/ΜL (ref 0.6–4.47)
LYMPHOCYTES NFR BLD AUTO: 33 % (ref 14–44)
MAGNESIUM SERPL-MCNC: 1.9 MG/DL (ref 1.9–2.7)
MCH RBC QN AUTO: 29.8 PG (ref 26.8–34.3)
MCHC RBC AUTO-ENTMCNC: 33.8 G/DL (ref 31.4–37.4)
MCV RBC AUTO: 88 FL (ref 82–98)
MONOCYTES # BLD AUTO: 0.8 THOUSAND/ΜL (ref 0.17–1.22)
MONOCYTES NFR BLD AUTO: 6 % (ref 4–12)
NEUTROPHILS # BLD AUTO: 7.36 THOUSANDS/ΜL (ref 1.85–7.62)
NEUTS SEG NFR BLD AUTO: 58 % (ref 43–75)
NRBC BLD AUTO-RTO: 0 /100 WBCS
PLATELET # BLD AUTO: 272 THOUSANDS/UL (ref 149–390)
PMV BLD AUTO: 9.8 FL (ref 8.9–12.7)
POTASSIUM SERPL-SCNC: 4.1 MMOL/L (ref 3.5–5.3)
RBC # BLD AUTO: 4.83 MILLION/UL (ref 3.81–5.12)
SODIUM SERPL-SCNC: 136 MMOL/L (ref 135–147)
WBC # BLD AUTO: 12.79 THOUSAND/UL (ref 4.31–10.16)

## 2024-11-25 PROCEDURE — 96372 THER/PROPH/DIAG INJ SC/IM: CPT

## 2024-11-25 PROCEDURE — 85025 COMPLETE CBC W/AUTO DIFF WBC: CPT | Performed by: EMERGENCY MEDICINE

## 2024-11-25 PROCEDURE — 80048 BASIC METABOLIC PNL TOTAL CA: CPT | Performed by: EMERGENCY MEDICINE

## 2024-11-25 PROCEDURE — 36415 COLL VENOUS BLD VENIPUNCTURE: CPT | Performed by: EMERGENCY MEDICINE

## 2024-11-25 PROCEDURE — 96374 THER/PROPH/DIAG INJ IV PUSH: CPT

## 2024-11-25 PROCEDURE — 83735 ASSAY OF MAGNESIUM: CPT | Performed by: EMERGENCY MEDICINE

## 2024-11-25 PROCEDURE — 96375 TX/PRO/DX INJ NEW DRUG ADDON: CPT

## 2024-11-25 PROCEDURE — 99291 CRITICAL CARE FIRST HOUR: CPT | Performed by: EMERGENCY MEDICINE

## 2024-11-25 PROCEDURE — 99283 EMERGENCY DEPT VISIT LOW MDM: CPT

## 2024-11-25 RX ORDER — METHYLPREDNISOLONE SODIUM SUCCINATE 125 MG/2ML
60 INJECTION, POWDER, LYOPHILIZED, FOR SOLUTION INTRAMUSCULAR; INTRAVENOUS ONCE
Status: COMPLETED | OUTPATIENT
Start: 2024-11-25 | End: 2024-11-25

## 2024-11-25 RX ORDER — EPINEPHRINE 1 MG/ML
0.3 INJECTION, SOLUTION, CONCENTRATE INTRAVENOUS ONCE
Status: COMPLETED | OUTPATIENT
Start: 2024-11-25 | End: 2024-11-25

## 2024-11-25 RX ORDER — FAMOTIDINE 10 MG/ML
20 INJECTION, SOLUTION INTRAVENOUS ONCE
Status: COMPLETED | OUTPATIENT
Start: 2024-11-25 | End: 2024-11-25

## 2024-11-25 RX ORDER — DIPHENHYDRAMINE HYDROCHLORIDE 50 MG/ML
25 INJECTION INTRAMUSCULAR; INTRAVENOUS ONCE
Status: COMPLETED | OUTPATIENT
Start: 2024-11-25 | End: 2024-11-25

## 2024-11-25 RX ADMIN — METHYLPREDNISOLONE SODIUM SUCCINATE 60 MG: 125 INJECTION, POWDER, FOR SOLUTION INTRAMUSCULAR; INTRAVENOUS at 22:35

## 2024-11-25 RX ADMIN — DIPHENHYDRAMINE HYDROCHLORIDE 25 MG: 50 INJECTION, SOLUTION INTRAMUSCULAR; INTRAVENOUS at 22:35

## 2024-11-25 RX ADMIN — FAMOTIDINE 20 MG: 10 INJECTION, SOLUTION INTRAVENOUS at 22:35

## 2024-11-25 RX ADMIN — EPINEPHRINE 0.3 MG: 1 INJECTION, SOLUTION, CONCENTRATE INTRAVENOUS at 22:35

## 2024-11-25 NOTE — Clinical Note
Ramona Lira was seen and treated in our emergency department on 11/25/2024.                Diagnosis:     Ramona  may return to work on return date.    She may return on this date: 11/26/2024    Ms. Lira was seen in the [facility] emergency department on 25 November 2024.  Due to illness, she was unable to work on her 25-26 November overnight shift, but can return when next scheduled on 26 November.  She can return to work with no restrictions at that point.  Thank you.     If you have any questions or concerns, please don't hesitate to call.      Eliecer Vila, DO    ______________________________           _______________          _______________  Hospital Representative                              Date                                Time

## 2024-11-25 NOTE — Clinical Note
Ramona Lira was seen and treated in our emergency department on 11/25/2024.                Diagnosis:     Ramona  may return to work on return date.    She may return on this date: 11/26/2024    Ms. Lira was seen in the [facility] emergency department on 25 November 2024.  Due to illness, she was unable to work on her 25-26 November overnight shift, but can return when next scheduled on 26 November.  She can return to work with no restrictions at that point.  Thank you.     If you have any questions or concerns, please don't hesitate to call.      Eliecer Vila, DO    ______________________________           _______________          _______________  Hospital Representative                              Date                                Time

## 2024-11-26 VITALS
SYSTOLIC BLOOD PRESSURE: 164 MMHG | TEMPERATURE: 97.3 F | RESPIRATION RATE: 18 BRPM | DIASTOLIC BLOOD PRESSURE: 87 MMHG | HEART RATE: 97 BPM | OXYGEN SATURATION: 98 %

## 2024-11-26 RX ORDER — EPINEPHRINE 0.3 MG/.3ML
0.3 INJECTION SUBCUTANEOUS ONCE
Qty: 0.6 ML | Refills: 0 | Status: SHIPPED | OUTPATIENT
Start: 2024-11-26 | End: 2024-11-26

## 2024-11-26 RX ORDER — CETIRIZINE HYDROCHLORIDE 10 MG/1
10 TABLET ORAL DAILY
Qty: 30 TABLET | Refills: 0 | Status: SHIPPED | OUTPATIENT
Start: 2024-11-26 | End: 2024-12-03

## 2024-11-26 RX ORDER — PREDNISONE 20 MG/1
40 TABLET ORAL DAILY
Qty: 8 TABLET | Refills: 0 | Status: SHIPPED | OUTPATIENT
Start: 2024-11-27 | End: 2024-12-01

## 2024-11-26 NOTE — ED NOTES
"Patient reports feeling much better, she still has a \"funny\" feeling in the back of her throat however it has decreased     Elina Flanagan RN  11/26/24 0140    "

## 2024-11-26 NOTE — DISCHARGE INSTRUCTIONS
The exact cause of the allergic reaction is not certain: it could be from any of the foods in the meal you ate this evening.  You should avoid all of those foods for the immediate future.    You have been prescribed an Epipen in case you have another reaction.  If you have any further symptoms, please use the Epipen, then call 911 or go to the ER.    Please take the other medications for the next several days.    You can call any of the allergists listed on these discharge instructions for an appointment for further evaluation. You can also see your regular doctor for a follow-up appointment.

## 2024-11-26 NOTE — ED PROVIDER NOTES
Time reflects when diagnosis was documented in both MDM as applicable and the Disposition within this note       Time User Action Codes Description Comment    11/25/2024 11:50 PM Davidravi Eliecer Addi Add [T78.2XXA] Anaphylaxis, initial encounter           ED Disposition       ED Disposition   Discharge    Condition   Stable    Date/Time   Tue Nov 26, 2024  1:06 AM    Comment   Ramona Hill discharge to home/self care.                   Assessment & Plan       Medical Decision Making  Anaphylaxis by definition with exposure to suspected allergen with prompt adverse reaction in this case including both skin and airway.  Administered IM epinephrine 0.3 mg as well as intravenous steroids/antihistamine and IV fluid.  She did report improvement in symptoms when re-evaluated about 5 minutes following administration of epinephrine.  Check basic labs.  Will need prolonged monitoring given anaphylaxis.  At discharge will require epinephrine autoinjector Rx, allergy referral, and avoidance of suspected allergens.   Patient reevaluated several times with sustained improvement in pruritus in throat and feeling of tongue swelling.  Able to drink water without difficulty.  No recurrence of symptoms at T+ 2-hour cecile.  Will discharge with epinephrine autoinjector and short course of oral steroid.  Also with short course of oral antihistamine.  Referral to allergist for further evaluation.  Should avoid any of the components in the meal this evening that produced adverse reaction: my suspicion would be that nuts were most likely, as these seem to be the most allergenic components of the meal that she ate, but etiology at present remains unclear.  Reviewed self-administered epinephrine for any further reactions prior to contacting EMS or presenting to emergency department.  All questions answered to satisfaction prior to discharge. She expressed understanding and agreed to plan.    Amount and/or Complexity of Data Reviewed  Labs:  ordered. Decision-making details documented in ED Course.    Risk  OTC drugs.  Prescription drug management.        ED Course as of 24 1452   Mon 2024   2253 CBC and differential(!)  Leukocytosis  Hg/Hct wnl  Plt wnl   2320 ECG NSR 94 bpm    QTc 467  Normal axis  Nonspecific intraventricular block  T wave inversion aVL  No acute ST changes  No ectopy  Interpreted by me   2339 Basic metabolic panel(!)   2339 Magnesium   2339 Normal electrolytes and transaminases.       Medications   EPINEPHrine PF (ADRENALIN) 1 mg/mL injection 0.3 mg (0.3 mg Intramuscular Given 24)   diphenhydrAMINE (BENADRYL) injection 25 mg (25 mg Intravenous Given 24)   methylPREDNISolone sodium succinate (Solu-MEDROL) injection 60 mg (60 mg Intravenous Given 24)   Famotidine (PF) (PEPCID) injection 20 mg (20 mg Intravenous Given 24)       ED Risk Strat Scores             History of Present Illness       Chief Complaint   Patient presents with    Allergic Reaction     Pt reports that about 15 minutes ago she ate a chicken dish and shortly after she began with itching in the back of her throat. Pt reports Sob along with the itching.        Past Medical History:   Diagnosis Date    Asthma     Diabetes mellitus (HCC)       Past Surgical History:   Procedure Laterality Date    ADENOIDECTOMY       SECTION      COSMETIC SURGERY      stomach- lipo and fat transfer    FOOT FRACTURE SURGERY      TYMPANOSTOMY TUBE PLACEMENT      fell out      Family History   Problem Relation Age of Onset    Hypertension Mother     Diabetes Mother     Stomach cancer Father     Colon cancer Father     Diabetes Father     Lung cancer Maternal Grandmother     No Known Problems Maternal Grandfather     No Known Problems Paternal Grandmother     No Known Problems Paternal Grandfather     No Known Problems Half-Brother     No Known Problems Half-Brother     Stomach cancer Cousin     Hypertension  Maternal Aunt     Diabetes Maternal Aunt     Hypertension Maternal Aunt     No Known Problems Paternal Aunt     Diabetes Paternal Aunt     No Known Problems Paternal Aunt     No Known Problems Paternal Aunt       Social History     Tobacco Use    Smoking status: Every Day     Current packs/day: 1.00     Average packs/day: 1 pack/day for 15.0 years (15.0 ttl pk-yrs)     Types: Cigarettes    Smokeless tobacco: Never    Tobacco comments:     1-2 cigarettes a day   Vaping Use    Vaping status: Never Used   Substance Use Topics    Alcohol use: Not Currently    Drug use: Never      E-Cigarette/Vaping    E-Cigarette Use Never User       E-Cigarette/Vaping Substances    Nicotine No     THC No     CBD No     Flavoring No     Other No     Unknown No       I have reviewed and agree with the history as documented.     44-year-old woman presents to the emergency department with posterior pharyngeal tightness and anterior neck itching beginning after she ate 2 bites of dinner approximately 10-15 minutes prior to arrival.  This consisted of chicken, vegetables, and nuts.  She does not have any known allergies to any of the components of the meal and indeed has no known allergies.  Almost immediately after swallowing the first bite she developed itching in the back of her throat and feeling of tongue swelling.  She spit out the second bite of food when she realized she was having an adverse reaction.  She then presented promptly to the emergency department.  She reports pruritus on the anterior aspect of her neck as well.  She denies any abdominal pain/nausea/vomiting/lightheadedness/presyncope/generalized abdominal pain.  No generalized pruritus or urticaria.  No prior history of similar reaction.  Did not take or use anything for symptoms at home.      History provided by:  Medical records and patient  Allergic Reaction  Presenting symptoms: difficulty swallowing and itching    Presenting symptoms: no difficulty breathing, no  rash, no swelling and no wheezing    Severity:  Severe  Duration:  10 minutes  Prior allergic episodes:  No prior episodes (No known allergies; no hx of anaphylaxis)  Context comment:  Ate a meal consisting of chicken, vegetables, and nuts; not sure of exact components. Sx began after swallowing 1st bite; she spit out second bite when it became clear that she was having some kind of abnormal reaction  Relieved by:  Nothing  Worsened by:  Nothing  Ineffective treatments:  None tried      Review of Systems   Constitutional:  Negative for chills, fatigue and fever.   HENT:  Positive for trouble swallowing and voice change. Negative for sore throat.    Respiratory:  Negative for chest tightness, shortness of breath and wheezing.    Cardiovascular:  Negative for chest pain.   Gastrointestinal:  Negative for abdominal pain, nausea and vomiting.   Musculoskeletal: Negative.    Skin:  Positive for itching. Negative for color change, pallor and rash.   Neurological:  Negative for syncope, speech difficulty, weakness and light-headedness.   Hematological: Negative.    All other systems reviewed and are negative.          Objective       ED Triage Vitals   Temperature Pulse Blood Pressure Respirations SpO2 Patient Position - Orthostatic VS   11/25/24 2217 11/25/24 2217 11/25/24 2217 11/25/24 2217 11/25/24 2217 11/25/24 2217   (!) 96.9 °F (36.1 °C) 83 (!) 178/95 22 98 % Sitting      Temp Source Heart Rate Source BP Location FiO2 (%) Pain Score    11/25/24 2217 11/25/24 2217 11/25/24 2217 -- 11/26/24 0115    Temporal Monitor Right arm  No Pain      Vitals      Date and Time Temp Pulse SpO2 Resp BP Pain Score FACES Pain Rating User   11/26/24 0115 97.3 °F (36.3 °C) 97 98 % 18 164/87 No Pain -- LC   11/26/24 0106 -- 96 97 % 14 164/87 -- -- ZTR   11/25/24 2300 -- 88 98 % 20 156/84 -- -- CD   11/25/24 2251 -- 81 98 % 20 155/91 -- -- CD   11/25/24 2217 96.9 °F (36.1 °C) 83 98 % 22 178/95 -- -- CD            Physical Exam  Vitals and  nursing note reviewed.   Constitutional:       General: She is awake. She is in acute distress.      Appearance: Normal appearance. She is well-developed.   HENT:      Head: Normocephalic and atraumatic.      Right Ear: Hearing and external ear normal.      Left Ear: Hearing and external ear normal.      Mouth/Throat:      Comments: No intraoral swelling or edema  No lip swelling  Neck:      Trachea: Trachea and phonation normal.      Comments: Mild dysphonia; no stridor  Cardiovascular:      Rate and Rhythm: Normal rate and regular rhythm.      Pulses:           Radial pulses are 2+ on the right side and 2+ on the left side.        Dorsalis pedis pulses are 2+ on the right side and 2+ on the left side.        Posterior tibial pulses are 2+ on the right side and 2+ on the left side.      Heart sounds: Normal heart sounds, S1 normal and S2 normal. No murmur heard.     No friction rub. No gallop.   Pulmonary:      Effort: Pulmonary effort is normal. No respiratory distress.      Breath sounds: Normal breath sounds. No stridor. No decreased breath sounds, wheezing, rhonchi or rales.   Abdominal:      General: There is no distension.      Palpations: There is no mass.      Tenderness: There is no abdominal tenderness. There is no guarding or rebound.   Skin:     General: Skin is warm and dry.      Comments: No urticaria   Neurological:      Mental Status: She is alert and oriented to person, place, and time.      GCS: GCS eye subscore is 4. GCS verbal subscore is 5. GCS motor subscore is 6.      Cranial Nerves: No cranial nerve deficit.      Sensory: No sensory deficit.      Motor: No abnormal muscle tone.      Comments: PERRLA; EOMI. Sensation intact to light touch over face in V1-V3 distribution bilaterally. Facial expressions symmetric. Tongue/uvula midline. Shoulder shrug equal bilaterally. Strength 5/5 in UE/LE bilaterally. Sensation intact to light touch in UE/LE bilaterally.         Results Reviewed        Procedure Component Value Units Date/Time    Magnesium [[id number]]  (Normal) Collected: 11/25/24 2233    Lab Status: Final result Specimen: Blood from Arm, Right Updated: 11/25/24 2315     Magnesium 1.9 mg/dL     Basic metabolic panel [[id number]]  (Abnormal) Collected: 11/25/24 2233    Lab Status: Final result Specimen: Blood from Arm, Right Updated: 11/25/24 2314     Sodium 136 mmol/L      Potassium 4.1 mmol/L      Chloride 104 mmol/L      CO2 25 mmol/L      ANION GAP 7 mmol/L      BUN 17 mg/dL      Creatinine 0.90 mg/dL      Glucose 154 mg/dL      Calcium 9.2 mg/dL      eGFR 76 ml/min/1.73sq m     Narrative:      [employer] guidelines for Chronic Kidney Disease (CKD):     Stage 1 with normal or high GFR (GFR > 90 mL/min/1.73 square meters)    Stage 2 Mild CKD (GFR = 60-89 mL/min/1.73 square meters)    Stage 3A Moderate CKD (GFR = 45-59 mL/min/1.73 square meters)    Stage 3B Moderate CKD (GFR = 30-44 mL/min/1.73 square meters)    Stage 4 Severe CKD (GFR = 15-29 mL/min/1.73 square meters)    Stage 5 End Stage CKD (GFR <15 mL/min/1.73 square meters)  Note: GFR calculation is accurate only with a steady state creatinine    CBC and differential [[id number]]  (Abnormal) Collected: 11/25/24 2233    Lab Status: Final result Specimen: Blood from Arm, Right Updated: 11/25/24 2251     WBC 12.79 Thousand/uL      RBC 4.83 Million/uL      Hemoglobin 14.4 g/dL      Hematocrit 42.6 %      MCV 88 fL      MCH 29.8 pg      MCHC 33.8 g/dL      RDW 13.2 %      MPV 9.8 fL      Platelets 272 Thousands/uL      nRBC 0 /100 WBCs      Segmented % 58 %      Immature Grans % 0 %      Lymphocytes % 33 %      Monocytes % 6 %      Eosinophils Relative 2 %      Basophils Relative 1 %      Absolute Neutrophils 7.36 Thousands/µL      Absolute Immature Grans 0.04 Thousand/uL      Absolute Lymphocytes 4.27 Thousands/µL      Absolute Monocytes 0.80 Thousand/µL      Eosinophils Absolute 0.26 Thousand/µL      Basophils Absolute  0.06 Thousands/µL             No orders to display       CriticalCare Time    Date/Time: 11/26/2024 12:45 AM    Performed by: Eliecer Vila DO  Authorized by: Eliecer Vila DO    Critical care provider statement:     Critical care time (minutes):  40    Critical care time was exclusive of:  Separately billable procedures and treating other patients and teaching time    Critical care was necessary to treat or prevent imminent or life-threatening deterioration of the following conditions:  Respiratory failure    Critical care was time spent personally by me on the following activities:  Obtaining history from patient or surrogate, evaluation of patient's response to treatment, examination of patient, re-evaluation of patient's condition, ordering and review of laboratory studies, ordering and performing treatments and interventions, review of old charts and development of treatment plan with patient or surrogate    I assumed direction of critical care for this patient from another provider in my specialty: no        ED Medication and Procedure Management   Prior to Admission Medications   Prescriptions Last Dose Informant Patient Reported? Taking?   Blood Glucose Monitoring Suppl (OneTouch Verio Reflect) w/Device KIT   No No   Sig: Check blood sugars once daily. Please substitute with appropriate alternative as covered by patient's insurance. Dx: E11.65   Continuous Blood Gluc Sensor (FreeStyle Richelle 3 Sensor) MISC   Yes No   Diclofenac Sodium (VOLTAREN) 1 %   No No   Sig: Apply 2 g topically 4 (four) times a day   Naproxen Sodium-Gabapentin (Gabapentin-Naproxen Cmpd Kit) 5-10 % CREA   No No   Sig: Apply 1 Application topically 3 (three) times a day as needed (for right elbow pain)   NovoFine Plus Pen Needle 32G X 4 MM MISC   Yes No   Sig: USE TO INJECT MEDICATION UNDER THE SKIN ONCE WEEKLY FOR 30 DAYS   OneTouch Delica Lancets 33G MISC   No No   Sig: Check blood sugars once daily. Please substitute with  appropriate alternative as covered by patient's insurance. Dx: E11.65   Respiratory Therapy Supplies (Nebulizer) KAMINI   No No   Sig: Use as needed (Shortness of breath, wheezing)   Patient not taking: Reported on 9/21/2022   albuterol (2.5 mg/3 mL) 0.083 % nebulizer solution   No No   Sig: Take 3 mL (2.5 mg total) by nebulization every 6 (six) hours as needed for wheezing or shortness of breath   albuterol (PROVENTIL HFA,VENTOLIN HFA) 90 mcg/act inhaler   No No   Sig: Inhale 1-2 puffs every 4 (four) hours as needed for shortness of breath or wheezing   Patient not taking: Reported on 4/30/2024   famotidine (PEPCID) 20 mg tablet   No No   Sig: Take 1 tablet (20 mg total) by mouth 2 (two) times a day   glucose blood (OneTouch Verio) test strip   No No   Sig: Check blood sugars once daily. Please substitute with appropriate alternative as covered by patient's insurance. Dx: E11.65   halobetasol (ULTRAVATE) 0.05 % cream   No No   Sig: Apply topically 2 (two) times a day   lidocaine (Lidoderm) 5 %   No No   Sig: Apply 1 patch topically over 12 hours daily Remove & Discard patch within 12 hours or as directed by MD   Patient not taking: Reported on 3/5/2024   metFORMIN (GLUCOPHAGE) 1000 MG tablet   No No   Sig: Take 1 tablet (1,000 mg total) by mouth 2 (two) times a day with meals   naproxen (Naprosyn) 500 mg tablet   No No   Sig: Take 1 tablet (500 mg total) by mouth 2 (two) times a day with meals   nicotine (NICODERM CQ) 21 mg/24 hr TD 24 hr patch   No No   Sig: Place 1 patch on the skin over 24 hours every 24 hours   ondansetron (ZOFRAN-ODT) 4 mg disintegrating tablet   No No   Sig: Take 1 tablet (4 mg total) by mouth every 6 (six) hours as needed for vomiting or nausea   Patient not taking: Reported on 3/5/2024   semaglutide, 2 mg/dose, (Ozempic, 2 MG/DOSE,) 8 mg/ mL injection pen   No No   Sig: inject 0.75 MILLILITERS subcutaneously every 7 days      Facility-Administered Medications: None     Discharge Medication  List as of 11/26/2024  1:06 AM        START taking these medications    Details   cetirizine (ZyrTEC) 10 mg tablet Take 1 tablet (10 mg total) by mouth daily for 7 days, Starting Tue 11/26/2024, Until Tue 12/3/2024, Normal      EPINEPHrine (EPIPEN) 0.3 mg/0.3 mL SOAJ Inject 0.3 mL (0.3 mg total) into a muscle once for 1 dose, Starting Tue 11/26/2024, Normal      predniSONE 20 mg tablet Take 2 tablets (40 mg total) by mouth daily for 4 days Do not start before November 27, 2024., Starting Wed 11/27/2024, Until Sun 12/1/2024, Normal           CONTINUE these medications which have NOT CHANGED    Details   albuterol (2.5 mg/3 mL) 0.083 % nebulizer solution Take 3 mL (2.5 mg total) by nebulization every 6 (six) hours as needed for wheezing or shortness of breath, Starting Tue 4/30/2024, Normal      albuterol (PROVENTIL HFA,VENTOLIN HFA) 90 mcg/act inhaler Inhale 1-2 puffs every 4 (four) hours as needed for shortness of breath or wheezing, Starting Thu 12/21/2023, Normal      Blood Glucose Monitoring Suppl (OneTouch Verio Reflect) w/Device KIT Check blood sugars once daily. Please substitute with appropriate alternative as covered by patient's insurance. Dx: E11.65, Normal      Continuous Blood Gluc Sensor (FreeStyle Richelle 3 Sensor) MISC Historical Med      Diclofenac Sodium (VOLTAREN) 1 % Apply 2 g topically 4 (four) times a day, Starting Thu 6/23/2022, Normal      famotidine (PEPCID) 20 mg tablet Take 1 tablet (20 mg total) by mouth 2 (two) times a day, Starting Mon 5/6/2024, Normal      glucose blood (OneTouch Verio) test strip Check blood sugars once daily. Please substitute with appropriate alternative as covered by patient's insurance. Dx: E11.65, Normal      halobetasol (ULTRAVATE) 0.05 % cream Apply topically 2 (two) times a day, Starting Thu 4/21/2022, Normal      lidocaine (Lidoderm) 5 % Apply 1 patch topically over 12 hours daily Remove & Discard patch within 12 hours or as directed by MD, Starting Wed  8/9/2023, Normal      metFORMIN (GLUCOPHAGE) 1000 MG tablet Take 1 tablet (1,000 mg total) by mouth 2 (two) times a day with meals, Starting Sun 6/9/2024, Until Sat 9/7/2024, Normal      naproxen (Naprosyn) 500 mg tablet Take 1 tablet (500 mg total) by mouth 2 (two) times a day with meals, Starting Tue 10/1/2024, Normal      Naproxen Sodium-Gabapentin (Gabapentin-Naproxen Cmpd Kit) 5-10 % CREA Apply 1 Application topically 3 (three) times a day as needed (for right elbow pain), Starting Mon 4/29/2024, Print      nicotine (NICODERM CQ) 21 mg/24 hr TD 24 hr patch Place 1 patch on the skin over 24 hours every 24 hours, Starting Tue 1/30/2024, Normal      NovoFine Plus Pen Needle 32G X 4 MM MISC USE TO INJECT MEDICATION UNDER THE SKIN ONCE WEEKLY FOR 30 DAYS, Historical Med      ondansetron (ZOFRAN-ODT) 4 mg disintegrating tablet Take 1 tablet (4 mg total) by mouth every 6 (six) hours as needed for vomiting or nausea, Starting Thu 12/21/2023, Normal      OneTouch Delica Lancets 33G MISC Check blood sugars once daily. Please substitute with appropriate alternative as covered by patient's insurance. Dx: E11.65, Normal      Respiratory Therapy Supplies (Nebulizer) KAMINI Use as needed (Shortness of breath, wheezing), Starting Sun 9/18/2022, Normal      semaglutide, 2 mg/dose, (Ozempic, 2 MG/DOSE,) 8 mg/ mL injection pen inject 0.75 MILLILITERS subcutaneously every 7 days, Normal             ED SEPSIS DOCUMENTATION   Time reflects when diagnosis was documented in both MDM as applicable and the Disposition within this note       Time User Action Codes Description Comment    11/25/2024 11:50 PM Eliecer Vila Add [T78.2XXA] Anaphylaxis, initial encounter                  Eliecer Vila DO  11/26/24 1452

## 2024-12-30 ENCOUNTER — TELEPHONE (OUTPATIENT)
Age: 44
End: 2024-12-30

## 2024-12-30 DIAGNOSIS — E11.9 TYPE 2 DIABETES MELLITUS WITHOUT COMPLICATION, WITHOUT LONG-TERM CURRENT USE OF INSULIN (HCC): ICD-10-CM

## 2024-12-30 NOTE — TELEPHONE ENCOUNTER
Medication: Metformin (glucophage)    Dose/Frequency: 1000 mg, take 1 tablet by mouth 2 times a day with meals    Quantity: 60, R-2    Pharmacy: [facility], [place]     Office:   [x] PCP/Provider -   [] Speciality/Provider -     Does the patient have enough for 3 days?   [] Yes   [x] No - Send as HP to POD     Medication: semaglutide,     Dose/Frequency: 2 mg dose (Ozempic, 2 MG/Dose) 8 mg/ml injection pen, inject 0.75 milliliters subcutaneously every 7 days    Quantity: 2 ml    Pharmacy: [facility], [place]    Office:   [x] PCP/Provider -   [] Speciality/Provider -     Does the patient have enough for 3 days?   [] Yes   [x] No - Send as HP to POD    Medication: Blood glucose monitoring supply (one touch verio reflect) with device kit (PT STATES HER MONITOR IS OLD)    Dose/Frequency: check blood sugars once daily.  Please subsitute with appropriate alternative as covered by patient's insurance    Quantity: 1 kit    Pharmacy: [facility], [place]    Office:   [x] PCP/Provider -   [] Speciality/Provider -     Does the patient have enough for 3 days?   [] Yes   [x] No - Send as HP to POD     Medication: glucose blood (One touch verio) test strip    Dose/Frequency: check blood sugars once daily, please subsitute with appropriate alternative as covered by patients insurance    Quantity: 100, R-3    Pharmacy: [facility], [place]    Office:   [x] PCP/Provider -   [] Speciality/Provider -     Does the patient have enough for 3 days?   [] Yes   [x] No - Send as HP to POD    Medication: Onetouch Delica Lancets     Dose/Frequency: 33G Misc, check blood sugars once daily. Please substitute with appropriate alternative as covered by patient's insurance    Quantity: 100, R-3    Pharmacy: [facility], [place]    Office:   [x] PCP/Provider -   [] Speciality/Provider -     Does the patient have enough for 3 days?   [] Yes   [] No - Send as HP to POD

## 2024-12-30 NOTE — TELEPHONE ENCOUNTER
Pt called to schedule an appointment stating she just received new health insurance. Pt scheduled for 1/3/25 with Ellen. Nothing available with PCP.      Pt also requested refills for diabetic medications and a new glucometer, test strips and lancets. Said she has been without for a few months due to losing her previous health insurance.

## 2025-01-02 RX ORDER — BLOOD-GLUCOSE METER
KIT MISCELLANEOUS
Qty: 1 KIT | Refills: 0 | OUTPATIENT
Start: 2025-01-02

## 2025-01-02 RX ORDER — LANCETS 33 GAUGE
EACH MISCELLANEOUS
Qty: 100 EACH | Refills: 3 | OUTPATIENT
Start: 2025-01-02

## 2025-01-02 RX ORDER — BLOOD SUGAR DIAGNOSTIC
STRIP MISCELLANEOUS
Qty: 100 EACH | Refills: 3 | OUTPATIENT
Start: 2025-01-02

## 2025-01-03 DIAGNOSIS — E11.9 TYPE 2 DIABETES MELLITUS WITHOUT COMPLICATION, WITHOUT LONG-TERM CURRENT USE OF INSULIN (HCC): ICD-10-CM

## 2025-01-10 NOTE — TELEPHONE ENCOUNTER
Patient calling in regarding her     Blood Glucose Monitoring Suppl (OneTouch Verio Reflect) w/Device KIT   With the test strips and semaglutide, 2 mg/dose, (Ozempic, 2 MG/DOSE,) 8 mg/ mL injection pen.    Please advise patient does have an appt on 1/31 and patient would like this sent over to the [facility] in [place].    Please advise

## 2025-01-31 DIAGNOSIS — E11.9 TYPE 2 DIABETES MELLITUS WITHOUT COMPLICATION, WITHOUT LONG-TERM CURRENT USE OF INSULIN (HCC): Primary | ICD-10-CM

## 2025-02-12 ENCOUNTER — TELEPHONE (OUTPATIENT)
Dept: FAMILY MEDICINE CLINIC | Facility: CLINIC | Age: 45
End: 2025-02-12

## 2025-02-12 NOTE — TELEPHONE ENCOUNTER
Called patient to reschedule appointment on 02/18 @8:40, provider out of office that morning. LM and call back number.

## 2025-02-13 NOTE — TELEPHONE ENCOUNTER
Patient called to reschedule. However, patient is concerned bc she needed appointment in order to get her ozempic refill she says. Appointment rescheduled for 03/04. She says she has only 1 shot left for this week. Please advise. Thank you!     Ramona: [phone]

## 2025-02-24 ENCOUNTER — OFFICE VISIT (OUTPATIENT)
Dept: OBGYN CLINIC | Facility: OTHER | Age: 45
End: 2025-02-24
Payer: OTHER MISCELLANEOUS

## 2025-02-24 VITALS — BODY MASS INDEX: 28.51 KG/M2 | WEIGHT: 167 LBS | HEIGHT: 64 IN

## 2025-02-24 DIAGNOSIS — M25.521 PAIN IN RIGHT ELBOW: ICD-10-CM

## 2025-02-24 DIAGNOSIS — M77.8 TRICEPS TENDINITIS: ICD-10-CM

## 2025-02-24 DIAGNOSIS — M77.11 LATERAL EPICONDYLITIS OF RIGHT ELBOW: Primary | ICD-10-CM

## 2025-02-24 PROCEDURE — 99213 OFFICE O/P EST LOW 20 MIN: CPT | Performed by: ORTHOPAEDIC SURGERY
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
## 2025-02-27 DIAGNOSIS — E11.9 TYPE 2 DIABETES MELLITUS WITHOUT COMPLICATION, WITHOUT LONG-TERM CURRENT USE OF INSULIN (HCC): ICD-10-CM

## 2025-02-27 NOTE — TELEPHONE ENCOUNTER
Medication pended  I did notice patient has no showed for numerous back to back appts since January.    Patient is currently scheduled for 3/4/25 with Edin

## 2025-03-04 ENCOUNTER — OFFICE VISIT (OUTPATIENT)
Dept: FAMILY MEDICINE CLINIC | Facility: CLINIC | Age: 45
End: 2025-03-04
Payer: COMMERCIAL

## 2025-03-04 VITALS
SYSTOLIC BLOOD PRESSURE: 128 MMHG | TEMPERATURE: 97.4 F | HEIGHT: 64 IN | HEART RATE: 75 BPM | DIASTOLIC BLOOD PRESSURE: 80 MMHG | OXYGEN SATURATION: 100 % | WEIGHT: 186 LBS | BODY MASS INDEX: 31.76 KG/M2

## 2025-03-04 DIAGNOSIS — Z96.22 HISTORY OF TYMPANOSTOMY TUBE PLACEMENT: ICD-10-CM

## 2025-03-04 DIAGNOSIS — F11.20 CONTINUOUS OPIOID DEPENDENCE (HCC): ICD-10-CM

## 2025-03-04 DIAGNOSIS — E11.9 TYPE 2 DIABETES MELLITUS WITHOUT COMPLICATION, WITHOUT LONG-TERM CURRENT USE OF INSULIN (HCC): Primary | ICD-10-CM

## 2025-03-04 DIAGNOSIS — J45.901 EXACERBATION OF ASTHMA, UNSPECIFIED ASTHMA SEVERITY, UNSPECIFIED WHETHER PERSISTENT: ICD-10-CM

## 2025-03-04 DIAGNOSIS — Z23 ENCOUNTER FOR IMMUNIZATION: ICD-10-CM

## 2025-03-04 DIAGNOSIS — Z12.11 SCREENING FOR COLON CANCER: ICD-10-CM

## 2025-03-04 LAB — SL AMB POCT HEMOGLOBIN AIC: 6.7 (ref ?–6.5)

## 2025-03-04 PROCEDURE — 99214 OFFICE O/P EST MOD 30 MIN: CPT

## 2025-03-04 PROCEDURE — 83036 HEMOGLOBIN GLYCOSYLATED A1C: CPT

## 2025-03-04 NOTE — PROGRESS NOTES
Name: Ramona Lira      : 1980      MRN: [id number]  Encounter Provider: AMBIKA Cruz  Encounter Date: 3/4/2025   Encounter department: [facility]  :  Assessment & Plan  Type 2 diabetes mellitus without complication, without long-term current use of insulin (HCC)    Lab Results   Component Value Date    HGBA1C 6.7 (A) 2025     Presents for DM2 follow-up  Pt of Dr. Amaral/residents  My 1st encounter with the patient  Last OV 2024 for preop clearance  Since then, pt has no-showed for 10 appointments    Metformin refilled by PCP/resident 2025  Discussed updating labs per protocol  Orders were placed 2025  Counseled    Referral to Ophthal  Referral to Podiatry - prev placed by PCP    Orders:    IRIS Diabetic eye exam    POCT hemoglobin A1c    Ambulatory Referral to Ophthalmology; Future    Albumin / creatinine urine ratio; Future    Ambulatory Referral to Podiatry; Future    History of tympanostomy tube placement    Patient requesting ENT referral for hx of tube placement    Orders:    Ambulatory Referral to Otolaryngology; Future    Continuous opioid dependence (HCC)    History of opioid dependence per EPIC review         Exacerbation of asthma, unspecified asthma severity, unspecified whether persistent         Encounter for immunization    Counseled.    Orders:    HEPATITIS A VACCINE ADULT IM    influenza vaccine preservative-free 0.5 mL IM (Fluzone, Afluria, Fluarix, Flulaval)    Screening for colon cancer    Orders:    Ambulatory Referral to Gastroenterology; Future    Cologuard          Depression Screening and Follow-up Plan: Patient was screened for depression during today's encounter. They screened negative with a PHQ-2 score of 0.      Tobacco Cessation Counseling: Tobacco cessation counseling was provided. The patient is sincerely urged to quit consumption of tobacco. She is not ready to quit tobacco. Medication options and side effects of  "medication discussed. Patient refused medication.       History of Present Illness     Review of Systems   Constitutional:  Negative for chills, fatigue and fever.   HENT:  Negative for congestion, ear pain, facial swelling, hearing loss, rhinorrhea, sinus pressure, sneezing, sore throat and trouble swallowing.    Eyes:  Negative for pain, redness and visual disturbance.   Respiratory:  Negative for cough, chest tightness, shortness of breath and wheezing.    Cardiovascular:  Negative for chest pain and palpitations.   Gastrointestinal:  Negative for abdominal pain, diarrhea, nausea and vomiting.   Genitourinary:  Negative for dysuria, flank pain, hematuria and pelvic pain.   Musculoskeletal:  Negative for arthralgias, back pain and myalgias.   Skin:  Negative for color change and rash.   Neurological:  Negative for dizziness, seizures, syncope, weakness, light-headedness and headaches.   Psychiatric/Behavioral:  Negative for confusion, hallucinations and sleep disturbance. The patient is not nervous/anxious.    All other systems reviewed and are negative.      Objective   /80   Pulse 75   Temp (!) 97.4 °F (36.3 °C)   Ht 5' 4\" (1.626 m)   Wt 84.4 kg (186 lb)   LMP 02/07/2025 (Approximate)   SpO2 100%   BMI 31.93 kg/m²      Physical Exam  Vitals and nursing note reviewed.   Constitutional:       General: She is not in acute distress.     Appearance: She is well-developed.   HENT:      Head: Normocephalic and atraumatic.      Right Ear: Hearing and tympanic membrane normal.      Left Ear: Hearing and tympanic membrane normal.      Nose: Nose normal.      Mouth/Throat:      Mouth: Mucous membranes are moist.      Dentition: Normal dentition.      Tongue: No lesions.      Pharynx: Oropharynx is clear. Uvula midline. No oropharyngeal exudate.      Tonsils: No tonsillar exudate.   Eyes:      Extraocular Movements: Extraocular movements intact.      Conjunctiva/sclera: Conjunctivae normal.   Neck:      " Vascular: No carotid bruit or JVD.   Cardiovascular:      Rate and Rhythm: Normal rate and regular rhythm.      Heart sounds: S1 normal and S2 normal. No murmur heard.  Pulmonary:      Effort: Pulmonary effort is normal. No tachypnea or respiratory distress.      Breath sounds: Normal breath sounds and air entry. No decreased breath sounds.   Chest:      Chest wall: No deformity or tenderness.   Abdominal:      General: Abdomen is flat. Bowel sounds are normal. There is no distension.      Palpations: Abdomen is soft.      Tenderness: There is no abdominal tenderness. There is no right CVA tenderness, left CVA tenderness or guarding.   Musculoskeletal:         General: No swelling.      Cervical back: Full passive range of motion without pain and neck supple.      Right lower leg: No edema.      Left lower leg: No edema.   Lymphadenopathy:      Cervical: No cervical adenopathy.   Skin:     General: Skin is warm and dry.      Capillary Refill: Capillary refill takes less than 2 seconds.      Findings: No rash.   Neurological:      Mental Status: She is alert and oriented to person, place, and time.      Cranial Nerves: Cranial nerves 2-12 are intact.      Sensory: Sensation is intact.      Motor: Motor function is intact.      Coordination: Coordination is intact.      Gait: Gait is intact.   Psychiatric:         Mood and Affect: Mood normal.         Behavior: Behavior is cooperative.

## 2025-03-04 NOTE — ASSESSMENT & PLAN NOTE
Lab Results   Component Value Date    HGBA1C 6.7 (A) 03/04/2025     Presents for DM2 follow-up  Pt of Dr. Amaral/residents  My 1st encounter with the patient  Last OV 4/30/2024 for preop clearance  Since then, pt has no-showed for 10 appointments    Metformin refilled by PCP/resident 1/2025  Discussed updating labs per protocol  Orders were placed 1/31/2025  Counseled    Referral to Ophthal  Referral to Podiatry - prev placed by PCP    Orders:    IRIS Diabetic eye exam    POCT hemoglobin A1c    Ambulatory Referral to Ophthalmology; Future    Albumin / creatinine urine ratio; Future    Ambulatory Referral to Podiatry; Future

## 2025-03-05 ENCOUNTER — HOSPITAL ENCOUNTER (OUTPATIENT)
Dept: MRI IMAGING | Facility: HOSPITAL | Age: 45
Discharge: HOME | End: 2025-03-05
Attending: ORTHOPAEDIC SURGERY
Payer: COMMERCIAL

## 2025-03-05 ENCOUNTER — APPOINTMENT (OUTPATIENT)
Dept: LAB | Facility: HOSPITAL | Age: 45
End: 2025-03-05
Payer: COMMERCIAL

## 2025-03-05 DIAGNOSIS — E11.9 TYPE 2 DIABETES MELLITUS WITHOUT COMPLICATION, WITHOUT LONG-TERM CURRENT USE OF INSULIN (HCC): ICD-10-CM

## 2025-03-05 DIAGNOSIS — M25.521 PAIN IN RIGHT ELBOW: ICD-10-CM

## 2025-03-05 DIAGNOSIS — M77.8 TRICEPS TENDINITIS: ICD-10-CM

## 2025-03-05 DIAGNOSIS — M77.11 LATERAL EPICONDYLITIS OF RIGHT ELBOW: ICD-10-CM

## 2025-03-05 LAB
ALBUMIN SERPL BCG-MCNC: 4.5 G/DL (ref 3.5–5)
ALP SERPL-CCNC: 60 U/L (ref 34–104)
ALT SERPL W P-5'-P-CCNC: 13 U/L (ref 7–52)
ANION GAP SERPL CALCULATED.3IONS-SCNC: 9 MMOL/L (ref 4–13)
AST SERPL W P-5'-P-CCNC: 12 U/L (ref 13–39)
BASOPHILS # BLD AUTO: 0.06 THOUSANDS/ÂΜL (ref 0–0.1)
BASOPHILS NFR BLD AUTO: 1 % (ref 0–1)
BILIRUB SERPL-MCNC: 0.49 MG/DL (ref 0.2–1)
BUN SERPL-MCNC: 16 MG/DL (ref 5–25)
CALCIUM SERPL-MCNC: 9.5 MG/DL (ref 8.4–10.2)
CHLORIDE SERPL-SCNC: 105 MMOL/L (ref 96–108)
CHOLEST SERPL-MCNC: 154 MG/DL (ref ?–200)
CO2 SERPL-SCNC: 24 MMOL/L (ref 21–32)
CREAT SERPL-MCNC: 0.79 MG/DL (ref 0.6–1.3)
EOSINOPHIL # BLD AUTO: 0.28 THOUSAND/ÂΜL (ref 0–0.61)
EOSINOPHIL NFR BLD AUTO: 2 % (ref 0–6)
ERYTHROCYTE [DISTWIDTH] IN BLOOD BY AUTOMATED COUNT: 13.3 % (ref 11.6–15.1)
EST. AVERAGE GLUCOSE BLD GHB EST-MCNC: 140 MG/DL
GFR SERPL CREATININE-BSD FRML MDRD: 90 ML/MIN/1.73SQ M
GLUCOSE P FAST SERPL-MCNC: 164 MG/DL (ref 65–99)
HBA1C MFR BLD: 6.5 %
HCT VFR BLD AUTO: 44.7 % (ref 34.8–46.1)
HDLC SERPL-MCNC: 32 MG/DL
HGB BLD-MCNC: 14.8 G/DL (ref 11.5–15.4)
IMM GRANULOCYTES # BLD AUTO: 0.03 THOUSAND/UL (ref 0–0.2)
IMM GRANULOCYTES NFR BLD AUTO: 0 % (ref 0–2)
LDLC SERPL CALC-MCNC: 92 MG/DL (ref 0–100)
LYMPHOCYTES # BLD AUTO: 3.22 THOUSANDS/ÂΜL (ref 0.6–4.47)
LYMPHOCYTES NFR BLD AUTO: 26 % (ref 14–44)
MCH RBC QN AUTO: 29.7 PG (ref 26.8–34.3)
MCHC RBC AUTO-ENTMCNC: 33.1 G/DL (ref 31.4–37.4)
MCV RBC AUTO: 90 FL (ref 82–98)
MONOCYTES # BLD AUTO: 0.62 THOUSAND/ÂΜL (ref 0.17–1.22)
MONOCYTES NFR BLD AUTO: 5 % (ref 4–12)
NEUTROPHILS # BLD AUTO: 8.01 THOUSANDS/ÂΜL (ref 1.85–7.62)
NEUTS SEG NFR BLD AUTO: 66 % (ref 43–75)
NONHDLC SERPL-MCNC: 122 MG/DL
NRBC BLD AUTO-RTO: 0 /100 WBCS
PLATELET # BLD AUTO: 293 THOUSANDS/UL (ref 149–390)
PMV BLD AUTO: 9.9 FL (ref 8.9–12.7)
POTASSIUM SERPL-SCNC: 3.9 MMOL/L (ref 3.5–5.3)
PROT SERPL-MCNC: 7.8 G/DL (ref 6.4–8.4)
RBC # BLD AUTO: 4.99 MILLION/UL (ref 3.81–5.12)
SODIUM SERPL-SCNC: 138 MMOL/L (ref 135–147)
TRIGL SERPL-MCNC: 150 MG/DL (ref ?–150)
TSH SERPL DL<=0.05 MIU/L-ACNC: 2.11 UIU/ML (ref 0.45–4.5)
WBC # BLD AUTO: 12.22 THOUSAND/UL (ref 4.31–10.16)

## 2025-03-05 PROCEDURE — 73221 MRI JOINT UPR EXTREM W/O DYE: CPT

## 2025-03-05 PROCEDURE — 36415 COLL VENOUS BLD VENIPUNCTURE: CPT

## 2025-03-05 PROCEDURE — 85025 COMPLETE CBC W/AUTO DIFF WBC: CPT

## 2025-03-05 PROCEDURE — 83036 HEMOGLOBIN GLYCOSYLATED A1C: CPT

## 2025-03-05 PROCEDURE — 80061 LIPID PANEL: CPT

## 2025-03-05 PROCEDURE — 80053 COMPREHEN METABOLIC PANEL: CPT

## 2025-03-05 PROCEDURE — 84443 ASSAY THYROID STIM HORMONE: CPT

## 2025-03-05 RX ORDER — BLOOD SUGAR DIAGNOSTIC
STRIP MISCELLANEOUS
Qty: 30 EACH | Refills: 0 | Status: SHIPPED | OUTPATIENT
Start: 2025-03-05

## 2025-03-05 RX ORDER — LANCETS 33 GAUGE
EACH MISCELLANEOUS
Qty: 30 EACH | Refills: 0 | Status: SHIPPED | OUTPATIENT
Start: 2025-03-05

## 2025-03-05 RX ORDER — BLOOD-GLUCOSE METER
KIT MISCELLANEOUS
Qty: 1 KIT | Refills: 0 | Status: SHIPPED | OUTPATIENT
Start: 2025-03-05

## 2025-03-05 NOTE — TELEPHONE ENCOUNTER
Patient saw Edin yesterday. Was told she needed to complete labs before being able to get her medications sent to the pharmacy. Patient called to advise that her labs were completed. Please advise. Thank you!     Ramona : [phone]

## 2025-03-05 NOTE — TELEPHONE ENCOUNTER
Spoke with patient. She is aware medications were sent. Patient states that she didn't  Ozempic for Feb. because the provider wouldn't send it, as well as she didn't have insurance.

## 2025-03-05 NOTE — TELEPHONE ENCOUNTER
Patient would like a call at [phone] when these are sent. Patient said she did her labs today not sure why her medications has not been sent. She needs both of these.

## 2025-03-06 ENCOUNTER — TELEPHONE (OUTPATIENT)
Age: 45
End: 2025-03-06

## 2025-03-06 NOTE — TELEPHONE ENCOUNTER
PA for OneTouch Delica Lancets 33G MISC SUBMITTED to     via    [x]M-KEY: [id number]  []Surescripts-Case ID #   []Availity-Auth ID # NDC #   []Faxed to plan   []Other website   []Phone call Case ID #     [x]PA sent as URGENT    All office notes, labs and other pertaining documents and studies sent. Clinical questions answered. Awaiting determination from insurance company.     Turnaround time for your insurance to make a decision on your Prior Authorization can take 7-21 business days.

## 2025-03-06 NOTE — TELEPHONE ENCOUNTER
PA for glucose blood (OneTouch Verio) test strip SUBMITTED to         via    []CMM-KEY:   [x]Surescripts-Case ID # [id number]  []Availity-Auth ID # NDC #   []Faxed to plan   []Other website   []Phone call Case ID #     [x]PA sent as URGENT    All office notes, labs and other pertaining documents and studies sent. Clinical questions answered. Awaiting determination from insurance company.     Turnaround time for your insurance to make a decision on your Prior Authorization can take 7-21 business days.

## 2025-03-07 NOTE — TELEPHONE ENCOUNTER
PA for glucose blood (OneTouch Verio) test strip  DENIED    Reason:(Screenshot if applicable)        Message sent to office clinical pool Yes    Denial letter scanned into Media Yes    Appeal started No (Provider will need to decide if appeal is warranted and send clinical documentation to Prior Authorization Team for initiation.)    **Please follow up with your patient regarding denial and next steps**

## 2025-03-07 NOTE — TELEPHONE ENCOUNTER
Notified patient that this was not covered after doing prior auth and this would be an out of pocket expense patient  understood

## 2025-03-07 NOTE — TELEPHONE ENCOUNTER
PA for OneTouch Delica Lancets 33G MISC DENIED    Reason:(Screenshot if applicable)          Message sent to office clinical pool Yes    Denial letter scanned into Media Yes    Appeal started No (Provider will need to decide if appeal is warranted and send clinical documentation to Prior Authorization Team for initiation.)    **Please follow up with your patient regarding denial and next steps**

## 2025-03-21 ENCOUNTER — OFFICE VISIT (OUTPATIENT)
Dept: PODIATRY | Facility: CLINIC | Age: 45
End: 2025-03-21
Payer: COMMERCIAL

## 2025-03-21 VITALS
RESPIRATION RATE: 16 BRPM | TEMPERATURE: 97.4 F | OXYGEN SATURATION: 98 % | BODY MASS INDEX: 31.76 KG/M2 | HEIGHT: 64 IN | HEART RATE: 78 BPM | WEIGHT: 186 LBS

## 2025-03-21 DIAGNOSIS — G62.9 NEUROPATHY: ICD-10-CM

## 2025-03-21 DIAGNOSIS — S86.111S: ICD-10-CM

## 2025-03-21 DIAGNOSIS — I73.9 PERIPHERAL VASCULAR DISEASE (HCC): ICD-10-CM

## 2025-03-21 DIAGNOSIS — S86.112S: Primary | ICD-10-CM

## 2025-03-21 DIAGNOSIS — E11.9 TYPE 2 DIABETES MELLITUS WITHOUT COMPLICATION, WITHOUT LONG-TERM CURRENT USE OF INSULIN (HCC): ICD-10-CM

## 2025-03-21 DIAGNOSIS — S90.212A CONTUSION OF LEFT GREAT TOE WITH DAMAGE TO NAIL, INITIAL ENCOUNTER: ICD-10-CM

## 2025-03-21 DIAGNOSIS — M89.8X7 EXOSTOSIS OF BONE OF FOOT: ICD-10-CM

## 2025-03-21 PROCEDURE — 99203 OFFICE O/P NEW LOW 30 MIN: CPT | Performed by: PODIATRIST

## 2025-03-23 NOTE — PROGRESS NOTES
Name: Ramona Lira      : 1980      MRN: [id number]  Encounter Provider: Shawn Natarajan DPM  Encounter Date: 3/21/2025   Encounter department: [facility] [place]  :  Assessment & Plan  Type 2 diabetes mellitus without complication, without long-term current use of insulin (HCC)    Lab Results   Component Value Date    HGBA1C 6.5 (H) 2025       Orders:    Ambulatory Referral to Podiatry    Ambulatory referral to Physical Therapy; Future    VAS ARTERIAL DUPLEX- LOWER LIMB BILATERAL; Future    Tibialis posterior tendon rupture, left, sequela    Orders:    Ambulatory referral to Physical Therapy; Future    Tibialis posterior tendon rupture, right, sequela    Orders:    Ambulatory referral to Physical Therapy; Future    Peripheral vascular disease (HCC)    Orders:    VAS ARTERIAL DUPLEX- LOWER LIMB BILATERAL; Future    Neuropathy         Contusion of left great toe with damage to nail, initial encounter       Patient was informed that the left hallux nail would take approximately another 3 to 6 to 9 months to completely grow out.  When it does grow out it may be thicker than the rest of the nails there secondary to the trauma.  As far as the nail adhering to the nailbed the proximal portion that is currently grown out is well adhered so there is a good possibility that the remainder of the nail will adhere as well.  Patient was informed that the garbage tissue that is present on the nailbed will be pushed forward as the new nail continues to grow.  Exostosis of bone of foot       Referred the patient to Dr. Hardy for possible surgical consultation of removal of the bone spur on the second toe of her left foot.  Strongly encouraged the patient to get a good set of over-the-counter inserts until she can get the custom orthotics made.  It was explained to her that the orthotics will help control the excessive motion in the foot and take some distress and strain off of her arch area which she  had repaired on the left foot and is now having problems on the right foot.    Discussed proper shoe gear, daily inspections of feet, and general foot health with patient. Patient has Q8  findings and is recommended for at risk foot care every 9-10 weeks.    Return if symptoms worsen or fail to improve.     History of Present Illness   HPI  Ramona Lira is a 44 y.o. female who presents with chief complaint of pain in both of her arches with the right being more painful than the left.  Patient relates that she had bilateral tibialis posterior ruptures and had the left arch repaired a number of years ago.  She has a secondary complaint of a painful bone spur on the second toe of her left foot at the PIPJ.  She is a diabetic with peripheral neuropathy bilaterally with burning in the fourth interspace of the left foot.  She also relates that she had caught her left big toenail and it came off sometime ago and is questioning the new nail that is growing back.  History obtained from: patient    Review of Systems  Medical History Reviewed by provider this encounter:     .  Current Outpatient Medications on File Prior to Visit   Medication Sig Dispense Refill    albuterol (2.5 mg/3 mL) 0.083 % nebulizer solution Take 3 mL (2.5 mg total) by nebulization every 6 (six) hours as needed for wheezing or shortness of breath 75 mL 1    albuterol (PROVENTIL HFA,VENTOLIN HFA) 90 mcg/act inhaler Inhale 1-2 puffs every 4 (four) hours as needed for shortness of breath or wheezing 8 g 0    Blood Glucose Monitoring Suppl (OneTouch Verio Reflect) w/Device KIT Check blood sugars once daily. Please substitute with appropriate alternative as covered by patient's insurance. Dx: E11.65 1 kit 0    Continuous Blood Gluc Sensor (FreeStyle Richelle 3 Sensor) MISC       glucose blood (OneTouch Verio) test strip Check blood sugars once daily. Please substitute with appropriate alternative as covered by patient's insurance. Dx: E11.65 30 each 0     "halobetasol (ULTRAVATE) 0.05 % cream Apply topically 2 (two) times a day 50 g 0    lidocaine (Lidoderm) 5 % Apply 1 patch topically over 12 hours daily Remove & Discard patch within 12 hours or as directed by MD 30 patch 2    metFORMIN (GLUCOPHAGE) 1000 MG tablet Take 1 tablet (1,000 mg total) by mouth 2 (two) times a day with meals 60 tablet 0    naproxen (Naprosyn) 500 mg tablet Take 1 tablet (500 mg total) by mouth 2 (two) times a day with meals 15 tablet 0    nicotine (NICODERM CQ) 21 mg/24 hr TD 24 hr patch Place 1 patch on the skin over 24 hours every 24 hours 28 patch 0    NovoFine Plus Pen Needle 32G X 4 MM MISC USE TO INJECT MEDICATION UNDER THE SKIN ONCE WEEKLY FOR 30 DAYS      OneTouch Delica Lancets 33G MISC Check blood sugars once daily. Please substitute with appropriate alternative as covered by patient's insurance. Dx: E11.65 30 each 0    Respiratory Therapy Supplies (Nebulizer) KAMINI Use as needed (Shortness of breath, wheezing) 1 each 0    semaglutide, 2 mg/dose, (Ozempic, 2 MG/DOSE,) 8 mg/ mL injection pen Inject 0.75 mL (2 mg total) under the skin every 7 days 2 mL 0    cetirizine (ZyrTEC) 10 mg tablet Take 1 tablet (10 mg total) by mouth daily for 7 days 30 tablet 0    EPINEPHrine (EPIPEN) 0.3 mg/0.3 mL SOAJ Inject 0.3 mL (0.3 mg total) into a muscle once for 1 dose 0.6 mL 0     No current facility-administered medications on file prior to visit.      Social History     Tobacco Use    Smoking status: Every Day     Current packs/day: 1.00     Average packs/day: 1 pack/day for 15.0 years (15.0 ttl pk-yrs)     Types: Cigarettes    Smokeless tobacco: Never    Tobacco comments:     1-2 cigarettes a day   Vaping Use    Vaping status: Never Used   Substance and Sexual Activity    Alcohol use: Not Currently    Drug use: Never    Sexual activity: Yes     Partners: Male        Objective   Pulse 78   Temp (!) 97.4 °F (36.3 °C)   Resp 16   Ht 5' 4\" (1.626 m)   Wt 84.4 kg (186 lb)   LMP 02/07/2025 " (Approximate)   SpO2 98%   BMI 31.93 kg/m²      Physical Exam  Vascular status is a faint 1/4 DP PT negative digital hair slightly delayed capillary refill and normal distal cooling bilaterally.  Capillary refill is approximately 3 to 4 seconds.  Slight edema is present bilaterally.    Derm small amount of erythema is present on the medial side of the second PIPJ on the left foot.  There are scars present on the second and third PIPJ secondary to hammertoe surgery in the past.  There is also scar present on the left medial arch secondary to tendon repair.  On the left hallux nail there is proximal nail growth of about a quarter of the nailbed with minimal increase in thickness and no discoloration to the nail noted.  The nailbed currently is dry but the new nail that is growing is well adhered at the proximal margin.    Ortho exostosis is noted on the medial aspect of the second PIPJ with lateral deviation of the middle and distal phalanx of that toe.  There is collapsing of the right arch with pain upon palpation around the navicular area.  No pain with palpation of the left hallux and minimal pain with palpation of the arch area bilaterally where the rupture was.    Neuro light touch is intact and equal bilaterally.  There is diminished 0.5 monofilament testing noted on the forefoot of the right foot and mostly on the third toe of the left foot.  She was able to feel the plantar aspect of the arch and the submet 3 area bilaterally.  Patient also relates to burning in the fourth interspace on the left foot.    Administrative Statements   I have spent a total time of 15 minutes in caring for this patient on the day of the visit/encounter including Risks and benefits of tx options, Instructions for management, Patient and family education, Importance of tx compliance, Risk factor reductions, Counseling / Coordination of care, Documenting in the medical record, and Obtaining or reviewing history  .

## 2025-03-23 NOTE — ASSESSMENT & PLAN NOTE
Lab Results   Component Value Date    HGBA1C 6.5 (H) 03/05/2025       Orders:    Ambulatory Referral to Podiatry    Ambulatory referral to Physical Therapy; Future    VAS ARTERIAL DUPLEX- LOWER LIMB BILATERAL; Future

## 2025-03-26 DIAGNOSIS — S86.112S: Primary | ICD-10-CM

## 2025-03-26 DIAGNOSIS — E11.9 TYPE 2 DIABETES MELLITUS WITHOUT COMPLICATION, WITHOUT LONG-TERM CURRENT USE OF INSULIN (HCC): ICD-10-CM

## 2025-03-26 DIAGNOSIS — S86.111S: ICD-10-CM

## 2025-04-01 DIAGNOSIS — E11.9 TYPE 2 DIABETES MELLITUS WITHOUT COMPLICATION, WITHOUT LONG-TERM CURRENT USE OF INSULIN (HCC): ICD-10-CM

## 2025-04-07 DIAGNOSIS — E11.9 TYPE 2 DIABETES MELLITUS WITHOUT COMPLICATION, WITHOUT LONG-TERM CURRENT USE OF INSULIN (HCC): ICD-10-CM

## 2025-04-09 RX ORDER — SEMAGLUTIDE 2.68 MG/ML
INJECTION, SOLUTION SUBCUTANEOUS
Qty: 3 ML | OUTPATIENT
Start: 2025-04-09

## 2025-04-17 DIAGNOSIS — E11.9 TYPE 2 DIABETES MELLITUS WITHOUT COMPLICATION, WITHOUT LONG-TERM CURRENT USE OF INSULIN (HCC): ICD-10-CM

## 2025-04-17 NOTE — TELEPHONE ENCOUNTER
Medication denied again, I have never had a visit with this pt and they are overdue for everything. Please schedule.

## 2025-04-17 NOTE — TELEPHONE ENCOUNTER
Medication: semaglutide, 2 mg/dose, (Ozempic, 2 MG/DOSE,) 8 mg/ mL injection pen     Dose/Frequency:   Inject 0.75 mL (2 mg total) under the skin every 7 days        Quantity:  2 mL     Pharmacy: [id number] - [place] - [address] [place] [address]    Office:   [x] PCP/Provider - Daryl Amaral, DO   [] Speciality/Provider -     Does the patient have enough for 3 days?   [] Yes   [x] No - Send as HP to POD        Patient is requesting a 3 month supply as it is cheaper for her. Please advise.

## 2025-04-18 NOTE — TELEPHONE ENCOUNTER
Pt called in following up on her med refill of the following medication:  Requested Prescriptions     Refused Prescriptions Disp Refills    semaglutide, 2 mg/dose, (Ozempic, 2 MG/DOSE,) 8 mg/ mL injection pen 2 mL 0     Sig: Inject 0.75 mL (2 mg total) under the skin every 7 days     Refused By: MATTIE WEAVER     Reason for Refusal: Patient needs an appointment     Relayed the following message to pt:  Mattie Weaver, DO     4/17/25  3:14 PM  Note      Medication denied again, I have never had a visit with this pt and they are overdue for everything. Please schedule.        Pt became upset and states she did schedule an appt with Karolina for 03/04/25. Please see past appts.     Pt reporting she OUT OF HER MEDICATION and really needs her refill.    Attempted to reach out to Clinical team, but no one is available at the moment. Office closes at 4:00 PM.     Please advise call pt back with update on her medication. Thank you!    [place]: [phone]

## 2025-04-22 NOTE — TELEPHONE ENCOUNTER
Patient called regarding her Ozempic script for diabetes.     Says Dr. Amaral has denied it however,   the patient was seen 3/4/25 by Karolina Vazquez and also had blood work done at that time.     Also, Karolina Vazquez sent a script to the pharmacy for Ozempic in March 2025 as well.

## 2025-04-23 NOTE — TELEPHONE ENCOUNTER
Patient was seeing the residents only saw Karolina Vazquez one time please advise as patient is out of medication RX was last filled in March by PCP Dr. Amaral

## 2025-05-01 DIAGNOSIS — E11.9 TYPE 2 DIABETES MELLITUS WITHOUT COMPLICATION, WITHOUT LONG-TERM CURRENT USE OF INSULIN (HCC): ICD-10-CM

## 2025-05-01 DIAGNOSIS — R68.89 FLU-LIKE SYMPTOMS: ICD-10-CM

## 2025-05-01 RX ORDER — ALBUTEROL SULFATE 90 UG/1
1-2 INHALANT RESPIRATORY (INHALATION) EVERY 4 HOURS PRN
Qty: 8 G | Refills: 0 | Status: CANCELLED | OUTPATIENT
Start: 2025-05-01

## 2025-05-01 NOTE — TELEPHONE ENCOUNTER
Patient called requesting these medications be filled to bridge her to her upcoming appt 5/9/25 with [facility] as she is currently out of medications

## 2025-05-05 ENCOUNTER — OFFICE VISIT (OUTPATIENT)
Dept: FAMILY MEDICINE CLINIC | Facility: CLINIC | Age: 45
End: 2025-05-05
Payer: COMMERCIAL

## 2025-05-05 VITALS
TEMPERATURE: 96.9 F | DIASTOLIC BLOOD PRESSURE: 70 MMHG | OXYGEN SATURATION: 98 % | HEART RATE: 74 BPM | SYSTOLIC BLOOD PRESSURE: 112 MMHG | BODY MASS INDEX: 32.61 KG/M2 | HEIGHT: 64 IN | WEIGHT: 191 LBS

## 2025-05-05 DIAGNOSIS — R68.89 FLU-LIKE SYMPTOMS: ICD-10-CM

## 2025-05-05 DIAGNOSIS — J45.901 EXACERBATION OF ASTHMA, UNSPECIFIED ASTHMA SEVERITY, UNSPECIFIED WHETHER PERSISTENT: ICD-10-CM

## 2025-05-05 DIAGNOSIS — E11.9 TYPE 2 DIABETES MELLITUS WITHOUT COMPLICATION, WITHOUT LONG-TERM CURRENT USE OF INSULIN (HCC): ICD-10-CM

## 2025-05-05 DIAGNOSIS — F17.200 SMOKING ADDICTION: ICD-10-CM

## 2025-05-05 DIAGNOSIS — Z12.11 SCREEN FOR COLON CANCER: ICD-10-CM

## 2025-05-05 DIAGNOSIS — D72.829 LEUKOCYTOSIS, UNSPECIFIED TYPE: ICD-10-CM

## 2025-05-05 DIAGNOSIS — E11.9 TYPE 2 DIABETES MELLITUS WITHOUT COMPLICATION, WITHOUT LONG-TERM CURRENT USE OF INSULIN (HCC): Primary | ICD-10-CM

## 2025-05-05 DIAGNOSIS — Z12.11 SCREENING FOR COLON CANCER: ICD-10-CM

## 2025-05-05 DIAGNOSIS — S91.209A TOENAIL AVULSION: ICD-10-CM

## 2025-05-05 PROCEDURE — 99214 OFFICE O/P EST MOD 30 MIN: CPT | Performed by: FAMILY MEDICINE

## 2025-05-05 RX ORDER — NAPROXEN 500 MG/1
500 TABLET ORAL 2 TIMES DAILY WITH MEALS
Qty: 15 TABLET | Refills: 0 | Status: SHIPPED | OUTPATIENT
Start: 2025-05-05

## 2025-05-05 RX ORDER — ALBUTEROL SULFATE 90 UG/1
2 INHALANT RESPIRATORY (INHALATION) EVERY 6 HOURS PRN
Qty: 8.5 G | Refills: 3 | Status: CANCELLED | OUTPATIENT
Start: 2025-05-05

## 2025-05-05 RX ORDER — ALBUTEROL SULFATE 90 UG/1
1-2 INHALANT RESPIRATORY (INHALATION) EVERY 4 HOURS PRN
Qty: 8 G | Refills: 0 | Status: CANCELLED | OUTPATIENT
Start: 2025-05-05

## 2025-05-05 RX ORDER — BLOOD-GLUCOSE METER
KIT MISCELLANEOUS
Qty: 1 KIT | Refills: 0 | Status: SHIPPED | OUTPATIENT
Start: 2025-05-05

## 2025-05-05 RX ORDER — NICOTINE 21 MG/24HR
1 PATCH, TRANSDERMAL 24 HOURS TRANSDERMAL EVERY 24 HOURS
Qty: 28 PATCH | Refills: 0 | Status: SHIPPED | OUTPATIENT
Start: 2025-05-05

## 2025-05-05 RX ORDER — PEN NEEDLE, DIABETIC 32 GX 1/6"
NEEDLE, DISPOSABLE MISCELLANEOUS
Status: CANCELLED | OUTPATIENT
Start: 2025-05-05

## 2025-05-05 RX ORDER — ACYCLOVIR 800 MG/1
TABLET ORAL
Status: CANCELLED | OUTPATIENT
Start: 2025-05-05

## 2025-05-05 RX ORDER — LANCETS 33 GAUGE
EACH MISCELLANEOUS
Qty: 30 EACH | Refills: 0 | Status: SHIPPED | OUTPATIENT
Start: 2025-05-05

## 2025-05-05 RX ORDER — BLOOD SUGAR DIAGNOSTIC
STRIP MISCELLANEOUS
Qty: 30 EACH | Refills: 0 | Status: SHIPPED | OUTPATIENT
Start: 2025-05-05

## 2025-05-05 NOTE — PROGRESS NOTES
Assessment/Plan:    No problem-specific Assessment & Plan notes found for this encounter.       Diagnoses and all orders for this visit:    Type 2 diabetes mellitus without complication, without long-term current use of insulin ([facility])  -     Albumin / creatinine urine ratio; Future  -     Blood Glucose Monitoring Suppl (OneTouch Verio Reflect) w/Device KIT; Check blood sugars once daily. Please substitute with appropriate alternative as covered by patient's insurance. Dx: E11.65  -     glucose blood (OneTouch Verio) test strip; Check blood sugars once daily. Please substitute with appropriate alternative as covered by patient's insurance. Dx: E11.65  -     metFORMIN (GLUCOPHAGE) 1000 MG tablet; Take 1 tablet (1,000 mg total) by mouth 2 (two) times a day with meals  -     OneTouch Delica Lancets 33G MISC; Check blood sugars once daily. Please substitute with appropriate alternative as covered by patient's insurance. Dx: E11.65  -     semaglutide, 2 mg/dose, (Ozempic, 2 MG/DOSE,) 8 mg/ mL injection pen; Inject 0.75 mL (2 mg total) under the skin every 7 days    Screening for colon cancer    Type 2 diabetes mellitus without complication, without long-term current use of insulin ([facility])  Comments:  HbA1c down to 8 from 8.3  Patient reports not taking her DM meds last 1 month due to no refills  Patient requests refills for all her medications  Orders:  -     Albumin / creatinine urine ratio; Future  -     Blood Glucose Monitoring Suppl (OneTouch Verio Reflect) w/Device KIT; Check blood sugars once daily. Please substitute with appropriate alternative as covered by patient's insurance. Dx: E11.65  -     glucose blood (OneTouch Verio) test strip; Check blood sugars once daily. Please substitute with appropriate alternative as covered by patient's insurance. Dx: E11.65  -     metFORMIN (GLUCOPHAGE) 1000 MG tablet; Take 1 tablet (1,000 mg total) by mouth 2 (two) times a day with meals  -     OneTouch Delica Lancets 33G MISC;  Check blood sugars once daily. Please substitute with appropriate alternative as covered by patient's insurance. Dx: E11.65  -     semaglutide, 2 mg/dose, (Ozempic, 2 MG/DOSE,) 8 mg/ mL injection pen; Inject 0.75 mL (2 mg total) under the skin every 7 days    Type 2 diabetes mellitus without complication, without long-term current use of insulin ([facility])  Comments:  POCT A1c 7.2 improved from 7.6 last visit  Continue Ozempic 2 mg per dose  We will add metformin tapered from 500 to 2000 mg daily  DM Foot exam normal  Orders:  -     Albumin / creatinine urine ratio; Future  -     Blood Glucose Monitoring Suppl (OneTouch Verio Reflect) w/Device KIT; Check blood sugars once daily. Please substitute with appropriate alternative as covered by patient's insurance. Dx: E11.65  -     glucose blood (OneTouch Verio) test strip; Check blood sugars once daily. Please substitute with appropriate alternative as covered by patient's insurance. Dx: E11.65  -     metFORMIN (GLUCOPHAGE) 1000 MG tablet; Take 1 tablet (1,000 mg total) by mouth 2 (two) times a day with meals  -     OneTouch Delica Lancets 33G MISC; Check blood sugars once daily. Please substitute with appropriate alternative as covered by patient's insurance. Dx: E11.65  -     semaglutide, 2 mg/dose, (Ozempic, 2 MG/DOSE,) 8 mg/ mL injection pen; Inject 0.75 mL (2 mg total) under the skin every 7 days    Toenail avulsion  -     naproxen (Naprosyn) 500 mg tablet; Take 1 tablet (500 mg total) by mouth 2 (two) times a day with meals    Leukocytosis, unspecified type  -     Ambulatory Referral to Hematology / Oncology; Future    Smoking addiction  -     nicotine (NICODERM CQ) 21 mg/24 hr TD 24 hr patch; Place 1 patch on the skin over 24 hours every 24 hours    Screen for colon cancer  -     Ambulatory Referral to Gastroenterology; Future          PHQ-2/9 Depression Screening    Little interest or pleasure in doing things: 0 - not at all  Feeling down, depressed, or hopeless: 0 -  "not at all  PHQ-2 Score: 0  PHQ-2 Interpretation: Negative depression screen            Subjective:      Patient ID: Ramona Lira is a 44 y.o. female.    Diabetes  She presents for her follow-up diabetic visit. She has type 2 diabetes mellitus. Pertinent negatives for hypoglycemia include no seizures. Pertinent negatives for diabetes include no chest pain and no fatigue.       The following portions of the patient's history were reviewed and updated as appropriate: allergies, current medications, past family history, past medical history, past social history, past surgical history, and problem list.    Review of Systems   Constitutional:  Negative for chills, fatigue and fever.   HENT: Negative.  Negative for hearing loss.    Eyes: Negative.  Negative for visual disturbance.   Respiratory:  Positive for shortness of breath and wheezing.    Cardiovascular:  Negative for chest pain and palpitations.   Gastrointestinal:  Negative for abdominal pain, blood in stool, constipation, diarrhea, nausea and vomiting.   Endocrine: Negative.    Genitourinary:  Negative for difficulty urinating and dysuria.   Musculoskeletal:  Negative for arthralgias and myalgias.   Skin: Negative.    Allergic/Immunologic: Negative.    Neurological:  Negative for seizures and syncope.   Hematological:  Negative for adenopathy.   Psychiatric/Behavioral: Negative.           Objective:    /70 (Patient Position: Sitting)   Pulse 74   Temp (!) 96.9 °F (36.1 °C)   Ht 5' 4\" (1.626 m)   Wt 86.6 kg (191 lb)   LMP 04/22/2025 (Approximate)   SpO2 98%   BMI 32.79 kg/m²      Physical Exam  Vitals and nursing note reviewed.   Constitutional:       General: She is not in acute distress.     Appearance: Normal appearance. She is well-developed. She is not ill-appearing, toxic-appearing or diaphoretic.   HENT:      Head: Normocephalic and atraumatic.      Right Ear: Tympanic membrane, ear canal and external ear normal. There is no impacted cerumen. "      Left Ear: Tympanic membrane, ear canal and external ear normal. There is no impacted cerumen.      Nose: Nose normal. No congestion or rhinorrhea.      Mouth/Throat:      Mouth: Mucous membranes are moist.      Pharynx: Oropharynx is clear. No oropharyngeal exudate or posterior oropharyngeal erythema.   Eyes:      General: No scleral icterus.        Right eye: No discharge.         Left eye: No discharge.      Conjunctiva/sclera: Conjunctivae normal.      Pupils: Pupils are equal, round, and reactive to light.   Cardiovascular:      Rate and Rhythm: Normal rate and regular rhythm.      Pulses: Normal pulses.      Heart sounds: Normal heart sounds. No murmur heard.  Pulmonary:      Effort: Pulmonary effort is normal. No respiratory distress.      Breath sounds: Normal breath sounds. No wheezing, rhonchi or rales.   Abdominal:      General: Bowel sounds are normal. There is no distension.      Palpations: Abdomen is soft. There is no mass.      Tenderness: There is no abdominal tenderness. There is no guarding or rebound.   Musculoskeletal:         General: Normal range of motion.      Cervical back: Normal range of motion and neck supple. No rigidity.      Right lower leg: No edema.      Left lower leg: No edema.   Lymphadenopathy:      Cervical: No cervical adenopathy.   Skin:     General: Skin is warm and dry.      Findings: No rash.   Neurological:      General: No focal deficit present.      Mental Status: She is alert and oriented to person, place, and time.      Sensory: No sensory deficit.      Motor: No weakness or abnormal muscle tone.      Coordination: Coordination normal.      Gait: Gait normal.      Deep Tendon Reflexes: Reflexes are normal and symmetric.   Psychiatric:         Mood and Affect: Mood normal.         Behavior: Behavior normal.         Thought Content: Thought content normal.         Judgment: Judgment normal.

## 2025-05-05 NOTE — PROGRESS NOTES
"Adult Annual Physical  Name: Ramona Lira      : 1980      MRN: [id number]  Encounter Provider: Daryl Webb DO  Encounter Date: 2025   Encounter department: [facility]    :  Assessment & Plan        Preventive Screenings:    - Cervical cancer screening: screening up-to-date   - Breast cancer screening: screening up-to-date          History of Present Illness   {?Quick Links Encounters * My Last Note * Last Note in Specialty * Snapshot * Since Last Visit * History :04922}  Adult Annual Physical:  Patient presents for annual physical.     Diet and Physical Activity:  - Diet/Nutrition: well balanced diet.  - Exercise: moderate cardiovascular exercise.    Depression Screening:  - PHQ-2 Score: 0    General Health:  - Sleep: sleeps well.  - Hearing: normal hearing bilateral ears.  - Vision: no vision problems.  - Dental: regular dental visits.    Review of Systems  {Select to Display PMH (Optional):08756}    Objective {?Quick Links Trend Vitals * Enter New Vitals * Results Review * Timeline (Adult) * Labs * Imaging * Cardiology * Procedures * Lung Cancer Screening * Surgical eConsent :60156}  /70 (Patient Position: Sitting)   Pulse 74   Temp (!) 96.9 °F (36.1 °C)   Ht 5' 4\" (1.626 m)   Wt 86.6 kg (191 lb)   LMP 2025 (Approximate)   SpO2 98%   BMI 32.79 kg/m²     Physical Exam  {Administrative / Billing Section (Optional):27161}  "

## 2025-05-07 ENCOUNTER — TELEPHONE (OUTPATIENT)
Age: 45
End: 2025-05-07

## 2025-05-07 NOTE — TELEPHONE ENCOUNTER
Scheduled date of colonoscopy (as of today):6/4/25  Physician performing colonoscopy:DR RUBIN  Location of colonoscopy:[facility]  Bowel prep reviewed with patient:AMINAH/ARLENE SENT TO EMAIL  Instructions reviewed with patient by:LEO  Clearances: NA      PT IS DIABETIC, METFORMIN, OZEMPIC AS TOLD OF HOLD

## 2025-05-07 NOTE — TELEPHONE ENCOUNTER
05/07/25  Screened by: Andreina Monroy    Referring Provider ISIDRO    Pre- Screening:     There is no height or weight on file to calculate BMI.  Bmi 32.79  Has patient been referred for a routine screening Colonoscopy? yes  Is the patient between 45-75 years old? yes      Previous Colonoscopy yes   If yes:    Date: ??? Pt said a long time ago         Does the patient want to see a Gastroenterologist prior to their procedure OR are they having any GI symptoms? no    Has the patient been hospitalized or had abdominal surgery in the past 6 months? no    Does the patient use supplemental oxygen? no    Does the patient take Coumadin, Lovenox, Plavix, Elliquis, Xarelto, or other blood thinning medication? no    Has the patient had a stroke, cardiac event, or stent placed in the past year? no         If patient is between 45yrs - 49yrs, please advise patient that we will have to confirm benefits & coverage with their insurance company for a routine screening colonoscopy.

## 2025-05-14 ENCOUNTER — TELEPHONE (OUTPATIENT)
Dept: OBGYN CLINIC | Facility: OTHER | Age: 45
End: 2025-05-14

## 2025-05-29 ENCOUNTER — ANESTHESIA (OUTPATIENT)
Dept: ANESTHESIOLOGY | Facility: HOSPITAL | Age: 45
End: 2025-05-29

## 2025-05-29 ENCOUNTER — ANESTHESIA EVENT (OUTPATIENT)
Dept: ANESTHESIOLOGY | Facility: HOSPITAL | Age: 45
End: 2025-05-29

## 2025-05-29 NOTE — ANESTHESIA PREPROCEDURE EVALUATION
Procedure:  PRE-OP ONLY  Colonoscopy    ECG: NSR    DM2 A1c 6.5 - Ozempic  Asthma - albuterol  Relevant Problems   ENDO   (+) Type 2 diabetes mellitus without complication, without long-term current use of insulin ([facility])      NEURO/PSYCH   (+) Continuous opioid dependence ([facility])      PULMONARY   (+) Exacerbation of asthma, unspecified asthma severity, unspecified whether persistent             Anesthesia Plan  ASA Score- 2     Anesthesia Type- IV sedation with anesthesia with ASA Monitors.         Additional Monitors:     Airway Plan:            Plan Factors-    Chart reviewed. EKG reviewed.  Existing labs reviewed. Patient summary reviewed.                  Induction-     Postoperative Plan-         Informed Consent-       NPO Status:  No vitals data found for the desired time range.

## 2025-07-07 ENCOUNTER — TELEPHONE (OUTPATIENT)
Age: 45
End: 2025-07-07

## 2025-07-07 NOTE — TELEPHONE ENCOUNTER
Ramona called back, [facility] account is in brothers name, Regino Martinez, his phone number (account phone) is [phone]. When office talks with [facility] they need to give that info and not patients. Further questions, please call pt.

## 2025-07-07 NOTE — TELEPHONE ENCOUNTER
I did call and speak with patient in regards to reaching out to [employer] to have them fax the forms to us, states she did call them and they told her it has to come directly requested from the doctor's office. Information above is what was given and done.

## 2025-07-07 NOTE — TELEPHONE ENCOUNTER
Patient's power was turned off.  Asking for doctor to complete a medical necessity certificate.  She and her son both use a nebulizer.  You can call [facility] at [phone] or [phone].    [facility] A/C #[id number]   A/C name - Regino Martinez

## 2025-07-07 NOTE — TELEPHONE ENCOUNTER
"Spoke with Sapphire from [facility], states she is unable to find an account with the phone number & address on patient's chart. Sapphire gave me directions on a \"standard medical certificate form\" to fill this out and send back to the [facility] fax #[phone].     Form filled out with information that we have on patient's chart & placed in provider's folder.   "

## 2025-07-08 ENCOUNTER — TELEPHONE (OUTPATIENT)
Age: 45
End: 2025-07-08

## 2025-07-08 NOTE — TELEPHONE ENCOUNTER
Patient called,    Checking status on [facility] medical cert form if it has been received?      Asking has it been completed and faxed to [facility]?    Has concerns about electric shut off.    Please call patient and let know. Thank you.    Ramona --  [phone]

## 2025-07-08 NOTE — TELEPHONE ENCOUNTER
Called and spoke with PP, given the information for the account. [facility] is faxing a medical certification form to the office.

## 2025-07-08 NOTE — TELEPHONE ENCOUNTER
Ramona called again trying to confirm if we have received the form from [facility] and if it has been filled out and returned to them. I told her I could confirm that they were supposed to send the form but nothing else at this time. I assured her I would add another note stating she is concerned because her son has chronic asthma and she needs the power restored so she can give him nebulizer treatments.

## 2025-07-08 NOTE — TELEPHONE ENCOUNTER
"Pt requested that the extension is for 30 days and not 7 days as she stated\" she is entitled to 30 days \"  "

## 2025-07-08 NOTE — TELEPHONE ENCOUNTER
LM for patient to call back.    [employer] form has been sent twice to local fax and has still not been received, the provider is not in until Thursday so form will be signed once received on Thursday or Friday when he is here.     Patient can try call [employer] and having them fax to our fax at [phone], I attempted twice.

## 2025-07-09 NOTE — TELEPHONE ENCOUNTER
Ramona called back for update, communicated with her what Kylah said in her msg. Let her know Dr Amaral is out of office until tomorrow and gave her fax number Kylah provided for [facility] to send form to. She confirmed all of those directions and said she would get it done..     She would appreciate a call once form is received and been faxed back to [facility].    Thank you.

## 2025-07-09 NOTE — TELEPHONE ENCOUNTER
I called [facility] had form faxed over and was filled out signed by Ellen Pelletier and faxed back to [employer]    Patient made aware of this

## 2025-07-09 NOTE — TELEPHONE ENCOUNTER
Patient called back, states [employer] will not take fax number from her. They need direct contact from office. She requests [employer] called at  [phone] to give direct fax.

## 2025-08-12 ENCOUNTER — OFFICE VISIT (OUTPATIENT)
Dept: OBGYN CLINIC | Facility: OTHER | Age: 45
End: 2025-08-12
Payer: OTHER MISCELLANEOUS

## 2025-08-12 PROBLEM — M77.11 LATERAL EPICONDYLITIS OF RIGHT ELBOW: Status: ACTIVE | Noted: 2025-08-12

## 2025-08-12 PROBLEM — M25.521 PAIN IN RIGHT ELBOW: Status: ACTIVE | Noted: 2025-08-12
